# Patient Record
Sex: FEMALE | Race: WHITE | NOT HISPANIC OR LATINO | URBAN - METROPOLITAN AREA
[De-identification: names, ages, dates, MRNs, and addresses within clinical notes are randomized per-mention and may not be internally consistent; named-entity substitution may affect disease eponyms.]

---

## 2017-01-16 ENCOUNTER — INPATIENT (INPATIENT)
Facility: HOSPITAL | Age: 77
LOS: 8 days | Discharge: NURSING, REHAB OR LONG-TERM CARE | DRG: 690 | End: 2017-01-25
Attending: INTERNAL MEDICINE | Admitting: INTERNAL MEDICINE
Payer: MEDICARE

## 2017-01-16 VITALS
WEIGHT: 154.98 LBS | HEART RATE: 54 BPM | RESPIRATION RATE: 19 BRPM | OXYGEN SATURATION: 100 % | SYSTOLIC BLOOD PRESSURE: 116 MMHG | HEIGHT: 58 IN | TEMPERATURE: 98 F | DIASTOLIC BLOOD PRESSURE: 55 MMHG

## 2017-01-16 DIAGNOSIS — E86.0 DEHYDRATION: ICD-10-CM

## 2017-01-16 DIAGNOSIS — D64.9 ANEMIA, UNSPECIFIED: ICD-10-CM

## 2017-01-16 DIAGNOSIS — E83.52 HYPERCALCEMIA: ICD-10-CM

## 2017-01-16 DIAGNOSIS — N28.9 DISORDER OF KIDNEY AND URETER, UNSPECIFIED: ICD-10-CM

## 2017-01-16 DIAGNOSIS — Z29.9 ENCOUNTER FOR PROPHYLACTIC MEASURES, UNSPECIFIED: ICD-10-CM

## 2017-01-16 DIAGNOSIS — J44.9 CHRONIC OBSTRUCTIVE PULMONARY DISEASE, UNSPECIFIED: ICD-10-CM

## 2017-01-16 DIAGNOSIS — R41.82 ALTERED MENTAL STATUS, UNSPECIFIED: ICD-10-CM

## 2017-01-16 LAB
ACETONE SERPL-MCNC: ABNORMAL
ALBUMIN SERPL ELPH-MCNC: 3 G/DL — LOW (ref 3.5–5)
ALP SERPL-CCNC: 132 U/L — HIGH (ref 40–120)
ALT FLD-CCNC: 9 U/L DA — LOW (ref 10–60)
ANION GAP SERPL CALC-SCNC: 7 MMOL/L — SIGNIFICANT CHANGE UP (ref 5–17)
APPEARANCE UR: ABNORMAL
APTT BLD: 29.5 SEC — SIGNIFICANT CHANGE UP (ref 27.5–37.4)
AST SERPL-CCNC: 5 U/L — LOW (ref 10–40)
BACTERIA # UR AUTO: ABNORMAL /HPF
BASOPHILS # BLD AUTO: 0.1 K/UL — SIGNIFICANT CHANGE UP (ref 0–0.2)
BASOPHILS NFR BLD AUTO: 0.8 % — SIGNIFICANT CHANGE UP (ref 0–2)
BILIRUB SERPL-MCNC: 0.7 MG/DL — SIGNIFICANT CHANGE UP (ref 0.2–1.2)
BILIRUB UR-MCNC: NEGATIVE — SIGNIFICANT CHANGE UP
BUN SERPL-MCNC: 28 MG/DL — HIGH (ref 7–18)
CALCIUM SERPL-MCNC: 11.3 MG/DL — HIGH (ref 8.4–10.5)
CHLORIDE SERPL-SCNC: 114 MMOL/L — HIGH (ref 96–108)
CK MB BLD-MCNC: <2.7 % — SIGNIFICANT CHANGE UP (ref 0–3.5)
CK MB CFR SERPL CALC: <1 NG/ML — SIGNIFICANT CHANGE UP (ref 0–3.6)
CK SERPL-CCNC: 37 U/L — SIGNIFICANT CHANGE UP (ref 21–215)
CO2 SERPL-SCNC: 28 MMOL/L — SIGNIFICANT CHANGE UP (ref 22–31)
COLOR SPEC: SIGNIFICANT CHANGE UP
CREAT SERPL-MCNC: 2.05 MG/DL — HIGH (ref 0.5–1.3)
DIFF PNL FLD: ABNORMAL
EOSINOPHIL # BLD AUTO: 0.5 K/UL — SIGNIFICANT CHANGE UP (ref 0–0.5)
EOSINOPHIL NFR BLD AUTO: 3.5 % — SIGNIFICANT CHANGE UP (ref 0–6)
EPI CELLS # UR: ABNORMAL (ref 0–10)
GLUCOSE SERPL-MCNC: 107 MG/DL — HIGH (ref 70–99)
GLUCOSE UR QL: NEGATIVE — SIGNIFICANT CHANGE UP
HCT VFR BLD CALC: 36.6 % — SIGNIFICANT CHANGE UP (ref 34.5–45)
HGB BLD-MCNC: 11 G/DL — LOW (ref 11.5–15.5)
INR BLD: 1.06 RATIO — SIGNIFICANT CHANGE UP (ref 0.88–1.16)
KETONES UR-MCNC: NEGATIVE — SIGNIFICANT CHANGE UP
LACTATE SERPL-SCNC: 1 MMOL/L — SIGNIFICANT CHANGE UP (ref 0.7–2)
LEUKOCYTE ESTERASE UR-ACNC: ABNORMAL
LIDOCAIN IGE QN: 215 U/L — SIGNIFICANT CHANGE UP (ref 73–393)
LITHIUM SERPL-MCNC: 2.2 MMOL/L — CRITICAL HIGH (ref 0.6–1.2)
LYMPHOCYTES # BLD AUTO: 16.8 % — SIGNIFICANT CHANGE UP (ref 13–44)
LYMPHOCYTES # BLD AUTO: 2.4 K/UL — SIGNIFICANT CHANGE UP (ref 1–3.3)
MAGNESIUM SERPL-MCNC: 2.1 MG/DL — SIGNIFICANT CHANGE UP (ref 1.8–2.4)
MCHC RBC-ENTMCNC: 27.4 PG — SIGNIFICANT CHANGE UP (ref 27–34)
MCHC RBC-ENTMCNC: 30.1 GM/DL — LOW (ref 32–36)
MCV RBC AUTO: 91.1 FL — SIGNIFICANT CHANGE UP (ref 80–100)
MONOCYTES # BLD AUTO: 0.6 K/UL — SIGNIFICANT CHANGE UP (ref 0–0.9)
MONOCYTES NFR BLD AUTO: 3.9 % — SIGNIFICANT CHANGE UP (ref 2–14)
NEUTROPHILS # BLD AUTO: 10.8 K/UL — HIGH (ref 1.8–7.4)
NEUTROPHILS NFR BLD AUTO: 75 % — SIGNIFICANT CHANGE UP (ref 43–77)
NITRITE UR-MCNC: NEGATIVE — SIGNIFICANT CHANGE UP
NT-PROBNP SERPL-SCNC: 73 PG/ML — SIGNIFICANT CHANGE UP (ref 0–450)
PH UR: 6 — SIGNIFICANT CHANGE UP (ref 4.8–8)
PLATELET # BLD AUTO: 146 K/UL — LOW (ref 150–400)
POTASSIUM SERPL-MCNC: 4.4 MMOL/L — SIGNIFICANT CHANGE UP (ref 3.5–5.3)
POTASSIUM SERPL-SCNC: 4.4 MMOL/L — SIGNIFICANT CHANGE UP (ref 3.5–5.3)
PROT SERPL-MCNC: 7.1 G/DL — SIGNIFICANT CHANGE UP (ref 6–8.3)
PROT UR-MCNC: 30 MG/DL
PROTHROM AB SERPL-ACNC: 11.9 SEC — SIGNIFICANT CHANGE UP (ref 10–13.1)
RBC # BLD: 4.02 M/UL — SIGNIFICANT CHANGE UP (ref 3.8–5.2)
RBC # FLD: 15.6 % — HIGH (ref 10.3–14.5)
RBC CASTS # UR COMP ASSIST: ABNORMAL /HPF (ref 0–2)
SODIUM SERPL-SCNC: 149 MMOL/L — HIGH (ref 135–145)
SP GR SPEC: 1.01 — SIGNIFICANT CHANGE UP (ref 1.01–1.02)
TROPONIN I SERPL-MCNC: <0.015 NG/ML — SIGNIFICANT CHANGE UP (ref 0–0.04)
UROBILINOGEN FLD QL: NEGATIVE — SIGNIFICANT CHANGE UP
WBC # BLD: 14.5 K/UL — HIGH (ref 3.8–10.5)
WBC # FLD AUTO: 14.5 K/UL — HIGH (ref 3.8–10.5)
WBC UR QL: >50 /HPF (ref 0–5)

## 2017-01-16 PROCEDURE — 70450 CT HEAD/BRAIN W/O DYE: CPT | Mod: 26

## 2017-01-16 PROCEDURE — 99285 EMERGENCY DEPT VISIT HI MDM: CPT

## 2017-01-16 PROCEDURE — 71010: CPT | Mod: 26

## 2017-01-16 RX ORDER — PREGABALIN 225 MG/1
100 CAPSULE ORAL DAILY
Qty: 0 | Refills: 0 | Status: DISCONTINUED | OUTPATIENT
Start: 2017-01-16 | End: 2017-01-25

## 2017-01-16 RX ORDER — PANTOPRAZOLE SODIUM 20 MG/1
40 TABLET, DELAYED RELEASE ORAL
Qty: 0 | Refills: 0 | Status: DISCONTINUED | OUTPATIENT
Start: 2017-01-16 | End: 2017-01-25

## 2017-01-16 RX ORDER — SODIUM CHLORIDE 9 MG/ML
1000 INJECTION INTRAMUSCULAR; INTRAVENOUS; SUBCUTANEOUS ONCE
Qty: 0 | Refills: 0 | Status: COMPLETED | OUTPATIENT
Start: 2017-01-16 | End: 2017-01-16

## 2017-01-16 RX ORDER — ANASTROZOLE 1 MG/1
1 TABLET ORAL DAILY
Qty: 0 | Refills: 0 | Status: DISCONTINUED | OUTPATIENT
Start: 2017-01-16 | End: 2017-01-25

## 2017-01-16 RX ORDER — BETHANECHOL CHLORIDE 25 MG
25 TABLET ORAL
Qty: 0 | Refills: 0 | Status: DISCONTINUED | OUTPATIENT
Start: 2017-01-16 | End: 2017-01-25

## 2017-01-16 RX ORDER — ERGOCALCIFEROL 1.25 MG/1
50000 CAPSULE ORAL
Qty: 0 | Refills: 0 | Status: DISCONTINUED | OUTPATIENT
Start: 2017-01-17 | End: 2017-01-25

## 2017-01-16 RX ORDER — RISPERIDONE 4 MG/1
2 TABLET ORAL
Qty: 0 | Refills: 0 | Status: DISCONTINUED | OUTPATIENT
Start: 2017-01-16 | End: 2017-01-25

## 2017-01-16 RX ORDER — LITHIUM CARBONATE 300 MG/1
300 TABLET, EXTENDED RELEASE ORAL
Qty: 0 | Refills: 0 | Status: DISCONTINUED | OUTPATIENT
Start: 2017-01-16 | End: 2017-01-17

## 2017-01-16 RX ORDER — SODIUM CHLORIDE 9 MG/ML
3 INJECTION INTRAMUSCULAR; INTRAVENOUS; SUBCUTANEOUS ONCE
Qty: 0 | Refills: 0 | Status: COMPLETED | OUTPATIENT
Start: 2017-01-16 | End: 2017-01-16

## 2017-01-16 RX ORDER — SIMVASTATIN 20 MG/1
40 TABLET, FILM COATED ORAL AT BEDTIME
Qty: 0 | Refills: 0 | Status: DISCONTINUED | OUTPATIENT
Start: 2017-01-16 | End: 2017-01-25

## 2017-01-16 RX ORDER — HEPARIN SODIUM 5000 [USP'U]/ML
5000 INJECTION INTRAVENOUS; SUBCUTANEOUS EVERY 12 HOURS
Qty: 0 | Refills: 0 | Status: DISCONTINUED | OUTPATIENT
Start: 2017-01-16 | End: 2017-01-25

## 2017-01-16 RX ORDER — PIPERACILLIN AND TAZOBACTAM 4; .5 G/20ML; G/20ML
3.38 INJECTION, POWDER, LYOPHILIZED, FOR SOLUTION INTRAVENOUS ONCE
Qty: 0 | Refills: 0 | Status: COMPLETED | OUTPATIENT
Start: 2017-01-16 | End: 2017-01-16

## 2017-01-16 RX ORDER — LORATADINE 10 MG/1
10 TABLET ORAL DAILY
Qty: 0 | Refills: 0 | Status: DISCONTINUED | OUTPATIENT
Start: 2017-01-16 | End: 2017-01-25

## 2017-01-16 RX ORDER — BENZTROPINE MESYLATE 1 MG
0.5 TABLET ORAL AT BEDTIME
Qty: 0 | Refills: 0 | Status: DISCONTINUED | OUTPATIENT
Start: 2017-01-16 | End: 2017-01-25

## 2017-01-16 RX ORDER — CEFTRIAXONE 500 MG/1
1 INJECTION, POWDER, FOR SOLUTION INTRAMUSCULAR; INTRAVENOUS EVERY 24 HOURS
Qty: 0 | Refills: 0 | Status: DISCONTINUED | OUTPATIENT
Start: 2017-01-16 | End: 2017-01-18

## 2017-01-16 RX ADMIN — PIPERACILLIN AND TAZOBACTAM 200 GRAM(S): 4; .5 INJECTION, POWDER, LYOPHILIZED, FOR SOLUTION INTRAVENOUS at 15:48

## 2017-01-16 RX ADMIN — SIMVASTATIN 40 MILLIGRAM(S): 20 TABLET, FILM COATED ORAL at 21:52

## 2017-01-16 RX ADMIN — SODIUM CHLORIDE 3 MILLILITER(S): 9 INJECTION INTRAMUSCULAR; INTRAVENOUS; SUBCUTANEOUS at 14:39

## 2017-01-16 RX ADMIN — Medication 0.5 MILLIGRAM(S): at 21:52

## 2017-01-16 RX ADMIN — CEFTRIAXONE 100 GRAM(S): 500 INJECTION, POWDER, FOR SOLUTION INTRAMUSCULAR; INTRAVENOUS at 21:08

## 2017-01-16 RX ADMIN — SODIUM CHLORIDE 1000 MILLILITER(S): 9 INJECTION INTRAMUSCULAR; INTRAVENOUS; SUBCUTANEOUS at 21:09

## 2017-01-16 NOTE — H&P ADULT. - ASSESSMENT
Patient is a 77 y/o F , walks with a walker at baseline, from [facility]. PMH of COPD, anemia, Parkinson's disease, HLD, Bipolar Disorder (on lithium), Breast Cancer, CKD and recent discharge from [facility] (Dec 22) after treatment for UTI and pneumonia was sent back for change in mental state described as lethargy and drooling. Patient has h/o unasyn and Augmentin given for UTI and was evaluated by  Dr Phan, ID in the past

## 2017-01-16 NOTE — ED PROVIDER NOTE - NS ED MD SCRIBE ATTENDING SCRIBE SECTIONS
PHYSICAL EXAM/HIV/REVIEW OF SYSTEMS/HISTORY OF PRESENT ILLNESS/VITAL SIGNS( Pullset)/DISPOSITION/PAST MEDICAL/SURGICAL/SOCIAL HISTORY

## 2017-01-16 NOTE — ED PROVIDER NOTE - OBJECTIVE STATEMENT
77 y/o F w/ PMHx of Parkinson's disease, HLD, anemia, COPD, and bipolar disorder BIB EMS from NH p/w lethargy onset yesterday. NKDA. Pt is DNR. Pt is a former smoker. Multiple drug allergies. Hx and ROS limited by mentation. Pt does not ambulate at baseline.

## 2017-01-16 NOTE — H&P ADULT. - PROBLEM SELECTOR PLAN 6
- Hb 11.0 (baseline 9.3)  - secondary to hemoconcentration   - patient had anemia panel done in last admission and was found to have JANINA  - will continue ferrous sulphate   - will not repeat anemia panel as was performed within 3 months

## 2017-01-16 NOTE — H&P ADULT. - PROBLEM SELECTOR PLAN 2
- BUN/Cr ratio >20:1 indicative of pre-renal TAMARA   - will give IV fluids   - will not place Joseph's catheter because Sister feels that patient gets CAUTIs  - f/u urine lyes and osmolality   - hold nephrotoxic drugs   - f/u BUN/Cr in am   - if persists may get renal US - BUN/Cr ratio >20:1 indicative of pre-renal TAMARA   - TAMARA on CKD (lower GFR and Cr 2.02, baseline 1.5)  - will give IV fluids   - will not place Joseph's catheter because Sister feels that patient gets CAUTIs  - f/u urine lyes and osmolality   - hold nephrotoxic drugs   - f/u BUN/Cr in am   - if persists may get renal US

## 2017-01-16 NOTE — ED ADULT NURSE REASSESSMENT NOTE - NS ED NURSE REASSESS COMMENT FT1
Patient received being nursed in high folwer position, alert to person, speech incoherent. breathing spontaneously on oxygen via nasal cannula @3lpm.Patient reported no complaints of pain. Patient is being admitted. .Admitting MD at bedside.

## 2017-01-16 NOTE — ED ADULT NURSE NOTE - OBJECTIVE STATEMENT
Patient came to the ED alert and responsive to stimuli for lethargy. Patient has robert crackles on lungs. No distress noted. Placed on O2 2L NC.

## 2017-01-16 NOTE — ED PROVIDER NOTE - CARE PLAN
Principal Discharge DX:	Dehydration  Secondary Diagnosis:	UTI (urinary tract infection)  Secondary Diagnosis:	Renal insufficiency

## 2017-01-16 NOTE — H&P ADULT. - PROBLEM SELECTOR PLAN 1
- most likely secondary to UTI  - will get CT head to r/o any CVA (unlikely as no FND on exam)  - Ca elevated to 11, mildly elevated, unlikely etiology of AMS   - polypharmacy due to bipolar and parkinson's disease possible differential; f/u lithium levels   - continue Rocephin for UTI (s/p levaquin and zosyn in ED)  - f/u urine cultures and blood cultures   - tylenol as needed for fever  - f/u ECHO for worsening activity status  - X ray shows small left pleural effusion which might be the opacity seen earlier (2014, poor visualization of hemidiaphragm). no consolidation or infiltrates seen; will repeat CXR in am.  - aspiration precautions and speech and swallow evaluations requested; continue dysphagia diet at present (patient was taking pureed diet at NH) - most likely secondary to UTI  - will get CT head to r/o any CVA (unlikely as no FND on exam)  - Ca elevated to 11, mildly elevated, unlikely etiology of AMS   - polypharmacy due to bipolar and parkinson's disease possible differential  - lithium levels elevated. will hold lithium; next level in am; consider calling psych for another medication for bipolar if patient has tramaine episodes  - continue Rocephin for UTI (s/p levaquin and zosyn in ED)  - f/u urine cultures and blood cultures   - tylenol as needed for fever  - f/u ECHO for worsening activity status  - X ray shows small left pleural effusion which might be the opacity seen earlier (2014, poor visualization of hemidiaphragm). no consolidation or infiltrates seen; will repeat CXR in am.  - aspiration precautions and speech and swallow evaluations requested; continue dysphagia diet at present (patient was taking pureed diet at NH) - most likely secondary to UTI  - will get CT head to r/o any CVA (unlikely as no FND on exam)  - Ca elevated to 11, mildly elevated, unlikely etiology of AMS   - polypharmacy due to bipolar and parkinson's disease possible differential  - lithium levels elevated. will hold lithium; next level in am; called Dr Hoskins  for another medication for bipolar or dose adjustment   - continue Rocephin for UTI (s/p levaquin and zosyn in ED)  - f/u urine cultures and blood cultures   - tylenol as needed for fever  - f/u ECHO for worsening activity status  - X ray shows small left pleural effusion which might be the opacity seen earlier (2014, poor visualization of hemidiaphragm). no consolidation or infiltrates seen; will repeat CXR in am.  - aspiration precautions and speech and swallow evaluations requested; continue dysphagia diet at present (patient was taking pureed diet at NH)

## 2017-01-16 NOTE — H&P ADULT. - NEGATIVE ENMT SYMPTOMS
no vertigo/no sinus symptoms/no ear pain/no nasal discharge/no hearing difficulty/no tinnitus/no nasal obstruction/no nasal congestion

## 2017-01-16 NOTE — H&P ADULT. - PROBLEM SELECTOR PLAN 3
- most likley secondary to dehydration   - patient has h/o breast cancer and lumpectomy 3 years ago; possible etiology   - will give iv fluids  - repeat levels in am

## 2017-01-16 NOTE — ED PROVIDER NOTE - PMH
Anemia    Bipolar 1 disorder    COPD (chronic obstructive pulmonary disease)    HLD (hyperlipidemia)    Parkinson's disease

## 2017-01-16 NOTE — ED ADULT TRIAGE NOTE - NS ED TRIAGE AVPU SCALE
Painful - The patient does not respond to voice, but does respond to a painful stimulus, such as a squeeze to the hand or sternal rub. A noxious stimulous is needed to elicit a response.

## 2017-01-16 NOTE — ED PROVIDER NOTE - ENMT, MLM
Airway patent, Nasal mucosa clear. Mouth with dry mucosa. Throat has no vesicles, no oropharyngeal exudates and uvula is midline.

## 2017-01-16 NOTE — H&P ADULT. - RS GEN PE MLT RESP DETAILS PC
clear to auscultation bilaterally/breath sounds equal/good air movement/airway patent/normal/respirations non-labored

## 2017-01-16 NOTE — H&P ADULT. - HISTORY OF PRESENT ILLNESS
Patient is a 75 y/o F , walks with a walker at baseline, from [facility]. PMH of COPD, anemia, Parkinson's disease, HLD, Bipolar Disorder (on lithium), Breast Cancer, CKD and recent discharge from [facility] (Dec 22) after treatment for UTI and pneumonia was sent back for change in mental state described as lethargy and drooling.      Patient is lethargic and oriented only to name. History obtained by sister. After discharge, patient was in rehab where she became progressively weak. On Friday, sister noticed that patient was not able to walk, unable to talk and was drooling in saliva. She felt that patient had a stroke or overdosed on medications.  However in the NH she was told that patient didnt have a stroke and was sent to [facility] for further evaluation. Sister also reported that patient might have diarrhea since last few weeks and that she doesn't drink enough water and is usually dehydrated. Denies any pain, URI, hematuria, rash/ulcer, weight change or other complaints.   Social: patient has been living in facility for years and sister Brissa Lozano at [phone] makes decision for her. She has a daughter in [place] ([place]boni Dougherty). Patient is a former smoker.   Family: unknown   Goals of care: DNR/DNI as per MOLST form. Confirmed by sister

## 2017-01-17 LAB
ANION GAP SERPL CALC-SCNC: 4 MMOL/L — LOW (ref 5–17)
BASOPHILS # BLD AUTO: 0.1 K/UL — SIGNIFICANT CHANGE UP (ref 0–0.2)
BASOPHILS # BLD AUTO: 0.1 K/UL — SIGNIFICANT CHANGE UP (ref 0–0.2)
BASOPHILS NFR BLD AUTO: 0.5 % — SIGNIFICANT CHANGE UP (ref 0–2)
BASOPHILS NFR BLD AUTO: 0.7 % — SIGNIFICANT CHANGE UP (ref 0–2)
BUN SERPL-MCNC: 27 MG/DL — HIGH (ref 7–18)
CALCIUM SERPL-MCNC: 11.1 MG/DL — HIGH (ref 8.4–10.5)
CHLORIDE SERPL-SCNC: 122 MMOL/L — HIGH (ref 96–108)
CHOLEST SERPL-MCNC: 127 MG/DL — SIGNIFICANT CHANGE UP (ref 10–199)
CK SERPL-CCNC: 55 U/L — SIGNIFICANT CHANGE UP (ref 21–215)
CO2 SERPL-SCNC: 27 MMOL/L — SIGNIFICANT CHANGE UP (ref 22–31)
CREAT SERPL-MCNC: 1.82 MG/DL — HIGH (ref 0.5–1.3)
EOSINOPHIL # BLD AUTO: 0.4 K/UL — SIGNIFICANT CHANGE UP (ref 0–0.5)
EOSINOPHIL # BLD AUTO: 0.5 K/UL — SIGNIFICANT CHANGE UP (ref 0–0.5)
EOSINOPHIL NFR BLD AUTO: 2.8 % — SIGNIFICANT CHANGE UP (ref 0–6)
EOSINOPHIL NFR BLD AUTO: 2.9 % — SIGNIFICANT CHANGE UP (ref 0–6)
GLUCOSE SERPL-MCNC: 136 MG/DL — HIGH (ref 70–99)
HBA1C BLD-MCNC: 5.4 % — SIGNIFICANT CHANGE UP (ref 4–5.6)
HCT VFR BLD CALC: 30.9 % — LOW (ref 34.5–45)
HCT VFR BLD CALC: 31.6 % — LOW (ref 34.5–45)
HDLC SERPL-MCNC: 44 MG/DL — SIGNIFICANT CHANGE UP (ref 40–125)
HGB BLD-MCNC: 10 G/DL — LOW (ref 11.5–15.5)
HGB BLD-MCNC: 9.7 G/DL — LOW (ref 11.5–15.5)
LIPID PNL WITH DIRECT LDL SERPL: 41 MG/DL — SIGNIFICANT CHANGE UP
LITHIUM SERPL-MCNC: 1.8 MMOL/L — CRITICAL HIGH (ref 0.6–1.2)
LYMPHOCYTES # BLD AUTO: 1.7 K/UL — SIGNIFICANT CHANGE UP (ref 1–3.3)
LYMPHOCYTES # BLD AUTO: 1.8 K/UL — SIGNIFICANT CHANGE UP (ref 1–3.3)
LYMPHOCYTES # BLD AUTO: 11.5 % — LOW (ref 13–44)
LYMPHOCYTES # BLD AUTO: 11.6 % — LOW (ref 13–44)
MAGNESIUM SERPL-MCNC: 2.3 MG/DL — SIGNIFICANT CHANGE UP (ref 1.8–2.4)
MCHC RBC-ENTMCNC: 27.7 PG — SIGNIFICANT CHANGE UP (ref 27–34)
MCHC RBC-ENTMCNC: 29.2 PG — SIGNIFICANT CHANGE UP (ref 27–34)
MCHC RBC-ENTMCNC: 30.6 GM/DL — LOW (ref 32–36)
MCHC RBC-ENTMCNC: 32.2 GM/DL — SIGNIFICANT CHANGE UP (ref 32–36)
MCV RBC AUTO: 90.6 FL — SIGNIFICANT CHANGE UP (ref 80–100)
MCV RBC AUTO: 90.7 FL — SIGNIFICANT CHANGE UP (ref 80–100)
MONOCYTES # BLD AUTO: 0.5 K/UL — SIGNIFICANT CHANGE UP (ref 0–0.9)
MONOCYTES # BLD AUTO: 0.6 K/UL — SIGNIFICANT CHANGE UP (ref 0–0.9)
MONOCYTES NFR BLD AUTO: 3.3 % — SIGNIFICANT CHANGE UP (ref 2–14)
MONOCYTES NFR BLD AUTO: 3.7 % — SIGNIFICANT CHANGE UP (ref 2–14)
NEUTROPHILS # BLD AUTO: 11.9 K/UL — HIGH (ref 1.8–7.4)
NEUTROPHILS # BLD AUTO: 12.9 K/UL — HIGH (ref 1.8–7.4)
NEUTROPHILS NFR BLD AUTO: 81.3 % — HIGH (ref 43–77)
NEUTROPHILS NFR BLD AUTO: 81.8 % — HIGH (ref 43–77)
PHOSPHATE SERPL-MCNC: 2.9 MG/DL — SIGNIFICANT CHANGE UP (ref 2.5–4.5)
PLATELET # BLD AUTO: 124 K/UL — LOW (ref 150–400)
PLATELET # BLD AUTO: 127 K/UL — LOW (ref 150–400)
POTASSIUM SERPL-MCNC: 4.6 MMOL/L — SIGNIFICANT CHANGE UP (ref 3.5–5.3)
POTASSIUM SERPL-SCNC: 4.6 MMOL/L — SIGNIFICANT CHANGE UP (ref 3.5–5.3)
RBC # BLD: 3.42 M/UL — LOW (ref 3.8–5.2)
RBC # BLD: 3.49 M/UL — LOW (ref 3.8–5.2)
RBC # FLD: 15.4 % — HIGH (ref 10.3–14.5)
RBC # FLD: 15.5 % — HIGH (ref 10.3–14.5)
SODIUM SERPL-SCNC: 153 MMOL/L — HIGH (ref 135–145)
TOTAL CHOLESTEROL/HDL RATIO MEASUREMENT: 2.9 RATIO — LOW (ref 3.3–7.1)
TRIGL SERPL-MCNC: 210 MG/DL — HIGH (ref 10–149)
TSH SERPL-MCNC: 2.08 UU/ML — SIGNIFICANT CHANGE UP (ref 0.34–4.82)
VIT B12 SERPL-MCNC: 250 PG/ML — SIGNIFICANT CHANGE UP (ref 243–894)
VIT D25+D1,25 OH+D1,25 PNL SERPL-MCNC: 13.9 PG/ML — LOW (ref 19.9–79.3)
WBC # BLD: 14.6 K/UL — HIGH (ref 3.8–10.5)
WBC # BLD: 15.9 K/UL — HIGH (ref 3.8–10.5)
WBC # FLD AUTO: 14.6 K/UL — HIGH (ref 3.8–10.5)
WBC # FLD AUTO: 15.9 K/UL — HIGH (ref 3.8–10.5)

## 2017-01-17 PROCEDURE — 76770 US EXAM ABDO BACK WALL COMP: CPT | Mod: 26

## 2017-01-17 PROCEDURE — 76775 US EXAM ABDO BACK WALL LIM: CPT | Mod: 26

## 2017-01-17 PROCEDURE — 71010: CPT | Mod: 26

## 2017-01-17 RX ORDER — HALOPERIDOL DECANOATE 100 MG/ML
1 INJECTION INTRAMUSCULAR EVERY 12 HOURS
Qty: 0 | Refills: 0 | Status: DISCONTINUED | OUTPATIENT
Start: 2017-01-17 | End: 2017-01-18

## 2017-01-17 RX ORDER — SODIUM CHLORIDE 9 MG/ML
1000 INJECTION INTRAMUSCULAR; INTRAVENOUS; SUBCUTANEOUS
Qty: 0 | Refills: 0 | Status: DISCONTINUED | OUTPATIENT
Start: 2017-01-17 | End: 2017-01-17

## 2017-01-17 RX ORDER — SODIUM CHLORIDE 9 MG/ML
1000 INJECTION, SOLUTION INTRAVENOUS
Qty: 0 | Refills: 0 | Status: DISCONTINUED | OUTPATIENT
Start: 2017-01-17 | End: 2017-01-20

## 2017-01-17 RX ADMIN — ANASTROZOLE 1 MILLIGRAM(S): 1 TABLET ORAL at 12:26

## 2017-01-17 RX ADMIN — HEPARIN SODIUM 5000 UNIT(S): 5000 INJECTION INTRAVENOUS; SUBCUTANEOUS at 05:34

## 2017-01-17 RX ADMIN — Medication 0.5 MILLIGRAM(S): at 21:44

## 2017-01-17 RX ADMIN — HEPARIN SODIUM 5000 UNIT(S): 5000 INJECTION INTRAVENOUS; SUBCUTANEOUS at 17:57

## 2017-01-17 RX ADMIN — SODIUM CHLORIDE 75 MILLILITER(S): 9 INJECTION INTRAMUSCULAR; INTRAVENOUS; SUBCUTANEOUS at 12:26

## 2017-01-17 RX ADMIN — Medication 25 MILLIGRAM(S): at 05:34

## 2017-01-17 RX ADMIN — SIMVASTATIN 40 MILLIGRAM(S): 20 TABLET, FILM COATED ORAL at 21:44

## 2017-01-17 RX ADMIN — ERGOCALCIFEROL 50000 UNIT(S): 1.25 CAPSULE ORAL at 12:26

## 2017-01-17 RX ADMIN — RISPERIDONE 2 MILLIGRAM(S): 4 TABLET ORAL at 05:34

## 2017-01-17 RX ADMIN — LORATADINE 10 MILLIGRAM(S): 10 TABLET ORAL at 12:26

## 2017-01-17 RX ADMIN — PREGABALIN 100 MICROGRAM(S): 225 CAPSULE ORAL at 12:26

## 2017-01-17 RX ADMIN — PANTOPRAZOLE SODIUM 40 MILLIGRAM(S): 20 TABLET, DELAYED RELEASE ORAL at 06:21

## 2017-01-17 RX ADMIN — CEFTRIAXONE 100 GRAM(S): 500 INJECTION, POWDER, FOR SOLUTION INTRAMUSCULAR; INTRAVENOUS at 21:44

## 2017-01-17 RX ADMIN — SODIUM CHLORIDE 100 MILLILITER(S): 9 INJECTION, SOLUTION INTRAVENOUS at 21:44

## 2017-01-17 RX ADMIN — RISPERIDONE 2 MILLIGRAM(S): 4 TABLET ORAL at 17:56

## 2017-01-17 RX ADMIN — SODIUM CHLORIDE 100 MILLILITER(S): 9 INJECTION, SOLUTION INTRAVENOUS at 14:39

## 2017-01-17 RX ADMIN — Medication 25 MILLIGRAM(S): at 17:57

## 2017-01-18 LAB
-  AMIKACIN: SIGNIFICANT CHANGE UP
-  AMPICILLIN/SULBACTAM: SIGNIFICANT CHANGE UP
-  AMPICILLIN: SIGNIFICANT CHANGE UP
-  AZTREONAM: SIGNIFICANT CHANGE UP
-  CEFAZOLIN: SIGNIFICANT CHANGE UP
-  CEFEPIME: SIGNIFICANT CHANGE UP
-  CEFOXITIN: SIGNIFICANT CHANGE UP
-  CEFTAZIDIME: SIGNIFICANT CHANGE UP
-  CEFTRIAXONE: SIGNIFICANT CHANGE UP
-  CIPROFLOXACIN: SIGNIFICANT CHANGE UP
-  ERTAPENEM: SIGNIFICANT CHANGE UP
-  GENTAMICIN: SIGNIFICANT CHANGE UP
-  IMIPENEM: SIGNIFICANT CHANGE UP
-  LEVOFLOXACIN: SIGNIFICANT CHANGE UP
-  MEROPENEM: SIGNIFICANT CHANGE UP
-  NITROFURANTOIN: SIGNIFICANT CHANGE UP
-  PIPERACILLIN/TAZOBACTAM: SIGNIFICANT CHANGE UP
-  TOBRAMYCIN: SIGNIFICANT CHANGE UP
-  TRIMETHOPRIM/SULFAMETHOXAZOLE: SIGNIFICANT CHANGE UP
ANION GAP SERPL CALC-SCNC: 3 MMOL/L — LOW (ref 5–17)
BASOPHILS # BLD AUTO: 0.1 K/UL — SIGNIFICANT CHANGE UP (ref 0–0.2)
BASOPHILS NFR BLD AUTO: 0.8 % — SIGNIFICANT CHANGE UP (ref 0–2)
BUN SERPL-MCNC: 25 MG/DL — HIGH (ref 7–18)
CALCIUM SERPL-MCNC: 11.2 MG/DL — HIGH (ref 8.4–10.5)
CHLORIDE SERPL-SCNC: 118 MMOL/L — HIGH (ref 96–108)
CO2 SERPL-SCNC: 28 MMOL/L — SIGNIFICANT CHANGE UP (ref 22–31)
CREAT SERPL-MCNC: 1.8 MG/DL — HIGH (ref 0.5–1.3)
CULTURE RESULTS: SIGNIFICANT CHANGE UP
EOSINOPHIL # BLD AUTO: 0.5 K/UL — SIGNIFICANT CHANGE UP (ref 0–0.5)
EOSINOPHIL NFR BLD AUTO: 3.4 % — SIGNIFICANT CHANGE UP (ref 0–6)
GLUCOSE SERPL-MCNC: 131 MG/DL — HIGH (ref 70–99)
HCT VFR BLD CALC: 33.1 % — LOW (ref 34.5–45)
HGB BLD-MCNC: 10.2 G/DL — LOW (ref 11.5–15.5)
LITHIUM SERPL-MCNC: 1.5 MMOL/L — HIGH (ref 0.6–1.2)
LYMPHOCYTES # BLD AUTO: 20.1 % — SIGNIFICANT CHANGE UP (ref 13–44)
LYMPHOCYTES # BLD AUTO: 3.2 K/UL — SIGNIFICANT CHANGE UP (ref 1–3.3)
MCHC RBC-ENTMCNC: 28 PG — SIGNIFICANT CHANGE UP (ref 27–34)
MCHC RBC-ENTMCNC: 30.8 GM/DL — LOW (ref 32–36)
MCV RBC AUTO: 91.1 FL — SIGNIFICANT CHANGE UP (ref 80–100)
METHOD TYPE: SIGNIFICANT CHANGE UP
MONOCYTES # BLD AUTO: 0.7 K/UL — SIGNIFICANT CHANGE UP (ref 0–0.9)
MONOCYTES NFR BLD AUTO: 4.2 % — SIGNIFICANT CHANGE UP (ref 2–14)
NEUTROPHILS # BLD AUTO: 11.5 K/UL — HIGH (ref 1.8–7.4)
NEUTROPHILS NFR BLD AUTO: 71.5 % — SIGNIFICANT CHANGE UP (ref 43–77)
ORGANISM # SPEC MICROSCOPIC CNT: SIGNIFICANT CHANGE UP
ORGANISM # SPEC MICROSCOPIC CNT: SIGNIFICANT CHANGE UP
PLATELET # BLD AUTO: 130 K/UL — LOW (ref 150–400)
POTASSIUM SERPL-MCNC: 4.1 MMOL/L — SIGNIFICANT CHANGE UP (ref 3.5–5.3)
POTASSIUM SERPL-SCNC: 4.1 MMOL/L — SIGNIFICANT CHANGE UP (ref 3.5–5.3)
RBC # BLD: 3.63 M/UL — LOW (ref 3.8–5.2)
RBC # FLD: 15.6 % — HIGH (ref 10.3–14.5)
SODIUM SERPL-SCNC: 149 MMOL/L — HIGH (ref 135–145)
SPECIMEN SOURCE: SIGNIFICANT CHANGE UP
WBC # BLD: 16.1 K/UL — HIGH (ref 3.8–10.5)
WBC # FLD AUTO: 16.1 K/UL — HIGH (ref 3.8–10.5)

## 2017-01-18 RX ORDER — MEROPENEM 1 G/30ML
INJECTION INTRAVENOUS
Qty: 0 | Refills: 0 | Status: DISCONTINUED | OUTPATIENT
Start: 2017-01-18 | End: 2017-01-23

## 2017-01-18 RX ORDER — IPRATROPIUM/ALBUTEROL SULFATE 18-103MCG
3 AEROSOL WITH ADAPTER (GRAM) INHALATION EVERY 6 HOURS
Qty: 0 | Refills: 0 | Status: DISCONTINUED | OUTPATIENT
Start: 2017-01-18 | End: 2017-01-25

## 2017-01-18 RX ORDER — MEROPENEM 1 G/30ML
1000 INJECTION INTRAVENOUS ONCE
Qty: 0 | Refills: 0 | Status: COMPLETED | OUTPATIENT
Start: 2017-01-18 | End: 2017-01-18

## 2017-01-18 RX ORDER — CHOLECALCIFEROL (VITAMIN D3) 125 MCG
1000 CAPSULE ORAL DAILY
Qty: 0 | Refills: 0 | Status: DISCONTINUED | OUTPATIENT
Start: 2017-01-18 | End: 2017-01-18

## 2017-01-18 RX ORDER — ACETYLCYSTEINE 200 MG/ML
3 VIAL (ML) MISCELLANEOUS THREE TIMES A DAY
Qty: 0 | Refills: 0 | Status: DISCONTINUED | OUTPATIENT
Start: 2017-01-18 | End: 2017-01-25

## 2017-01-18 RX ORDER — MEROPENEM 1 G/30ML
1000 INJECTION INTRAVENOUS EVERY 8 HOURS
Qty: 0 | Refills: 0 | Status: DISCONTINUED | OUTPATIENT
Start: 2017-01-19 | End: 2017-01-23

## 2017-01-18 RX ADMIN — ANASTROZOLE 1 MILLIGRAM(S): 1 TABLET ORAL at 14:13

## 2017-01-18 RX ADMIN — MEROPENEM 200 MILLIGRAM(S): 1 INJECTION INTRAVENOUS at 20:41

## 2017-01-18 RX ADMIN — PREGABALIN 100 MICROGRAM(S): 225 CAPSULE ORAL at 14:13

## 2017-01-18 RX ADMIN — Medication 25 MILLIGRAM(S): at 05:06

## 2017-01-18 RX ADMIN — SIMVASTATIN 40 MILLIGRAM(S): 20 TABLET, FILM COATED ORAL at 22:05

## 2017-01-18 RX ADMIN — PANTOPRAZOLE SODIUM 40 MILLIGRAM(S): 20 TABLET, DELAYED RELEASE ORAL at 05:07

## 2017-01-18 RX ADMIN — Medication 25 MILLIGRAM(S): at 18:01

## 2017-01-18 RX ADMIN — SODIUM CHLORIDE 100 MILLILITER(S): 9 INJECTION, SOLUTION INTRAVENOUS at 05:06

## 2017-01-18 RX ADMIN — Medication 3 MILLILITER(S): at 20:46

## 2017-01-18 RX ADMIN — RISPERIDONE 2 MILLIGRAM(S): 4 TABLET ORAL at 18:01

## 2017-01-18 RX ADMIN — LORATADINE 10 MILLIGRAM(S): 10 TABLET ORAL at 14:13

## 2017-01-18 RX ADMIN — HEPARIN SODIUM 5000 UNIT(S): 5000 INJECTION INTRAVENOUS; SUBCUTANEOUS at 18:01

## 2017-01-18 RX ADMIN — RISPERIDONE 2 MILLIGRAM(S): 4 TABLET ORAL at 05:06

## 2017-01-18 RX ADMIN — SODIUM CHLORIDE 100 MILLILITER(S): 9 INJECTION, SOLUTION INTRAVENOUS at 09:02

## 2017-01-18 RX ADMIN — HEPARIN SODIUM 5000 UNIT(S): 5000 INJECTION INTRAVENOUS; SUBCUTANEOUS at 05:06

## 2017-01-18 RX ADMIN — Medication 0.5 MILLIGRAM(S): at 22:05

## 2017-01-18 NOTE — PHYSICAL THERAPY INITIAL EVALUATION ADULT - CRITERIA FOR SKILLED THERAPEUTIC INTERVENTIONS
therapy frequency/functional limitations in following categories/risk reduction/prevention/anticipated discharge recommendation/impairments found/predicted duration of therapy intervention/rehab potential

## 2017-01-18 NOTE — PHYSICAL THERAPY INITIAL EVALUATION ADULT - GENERAL OBSERVATIONS, REHAB EVAL
Found lying supine, alert but confused; has difficulty following verbal commands. Pt. appears very weak.

## 2017-01-19 LAB
ANION GAP SERPL CALC-SCNC: 6 MMOL/L — SIGNIFICANT CHANGE UP (ref 5–17)
BASOPHILS # BLD AUTO: 0.1 K/UL — SIGNIFICANT CHANGE UP (ref 0–0.2)
BASOPHILS NFR BLD AUTO: 0.6 % — SIGNIFICANT CHANGE UP (ref 0–2)
BUN SERPL-MCNC: 18 MG/DL — SIGNIFICANT CHANGE UP (ref 7–18)
CALCIUM SERPL-MCNC: 10.9 MG/DL — HIGH (ref 8.4–10.5)
CHLORIDE SERPL-SCNC: 117 MMOL/L — HIGH (ref 96–108)
CO2 SERPL-SCNC: 26 MMOL/L — SIGNIFICANT CHANGE UP (ref 22–31)
CREAT SERPL-MCNC: 1.5 MG/DL — HIGH (ref 0.5–1.3)
EOSINOPHIL # BLD AUTO: 0.6 K/UL — HIGH (ref 0–0.5)
EOSINOPHIL NFR BLD AUTO: 4.9 % — SIGNIFICANT CHANGE UP (ref 0–6)
GLUCOSE SERPL-MCNC: 107 MG/DL — HIGH (ref 70–99)
HCT VFR BLD CALC: 29.8 % — LOW (ref 34.5–45)
HGB BLD-MCNC: 9.5 G/DL — LOW (ref 11.5–15.5)
LITHIUM SERPL-MCNC: 1.2 MMOL/L — SIGNIFICANT CHANGE UP (ref 0.6–1.2)
LYMPHOCYTES # BLD AUTO: 2.9 K/UL — SIGNIFICANT CHANGE UP (ref 1–3.3)
LYMPHOCYTES # BLD AUTO: 23.9 % — SIGNIFICANT CHANGE UP (ref 13–44)
MCHC RBC-ENTMCNC: 28.4 PG — SIGNIFICANT CHANGE UP (ref 27–34)
MCHC RBC-ENTMCNC: 31.8 GM/DL — LOW (ref 32–36)
MCV RBC AUTO: 89.4 FL — SIGNIFICANT CHANGE UP (ref 80–100)
MONOCYTES # BLD AUTO: 0.4 K/UL — SIGNIFICANT CHANGE UP (ref 0–0.9)
MONOCYTES NFR BLD AUTO: 3.3 % — SIGNIFICANT CHANGE UP (ref 2–14)
NEUTROPHILS # BLD AUTO: 8.1 K/UL — HIGH (ref 1.8–7.4)
NEUTROPHILS NFR BLD AUTO: 67.3 % — SIGNIFICANT CHANGE UP (ref 43–77)
PLATELET # BLD AUTO: 114 K/UL — LOW (ref 150–400)
POTASSIUM SERPL-MCNC: 4.4 MMOL/L — SIGNIFICANT CHANGE UP (ref 3.5–5.3)
POTASSIUM SERPL-SCNC: 4.4 MMOL/L — SIGNIFICANT CHANGE UP (ref 3.5–5.3)
RBC # BLD: 3.33 M/UL — LOW (ref 3.8–5.2)
RBC # FLD: 15.2 % — HIGH (ref 10.3–14.5)
SODIUM SERPL-SCNC: 149 MMOL/L — HIGH (ref 135–145)
WBC # BLD: 12 K/UL — HIGH (ref 3.8–10.5)
WBC # FLD AUTO: 12 K/UL — HIGH (ref 3.8–10.5)

## 2017-01-19 RX ADMIN — MEROPENEM 200 MILLIGRAM(S): 1 INJECTION INTRAVENOUS at 05:53

## 2017-01-19 RX ADMIN — LORATADINE 10 MILLIGRAM(S): 10 TABLET ORAL at 11:43

## 2017-01-19 RX ADMIN — Medication 3 MILLILITER(S): at 20:26

## 2017-01-19 RX ADMIN — MEROPENEM 200 MILLIGRAM(S): 1 INJECTION INTRAVENOUS at 13:38

## 2017-01-19 RX ADMIN — PREGABALIN 100 MICROGRAM(S): 225 CAPSULE ORAL at 11:43

## 2017-01-19 RX ADMIN — PANTOPRAZOLE SODIUM 40 MILLIGRAM(S): 20 TABLET, DELAYED RELEASE ORAL at 05:53

## 2017-01-19 RX ADMIN — Medication 3 MILLILITER(S): at 02:20

## 2017-01-19 RX ADMIN — HEPARIN SODIUM 5000 UNIT(S): 5000 INJECTION INTRAVENOUS; SUBCUTANEOUS at 05:53

## 2017-01-19 RX ADMIN — MEROPENEM 200 MILLIGRAM(S): 1 INJECTION INTRAVENOUS at 23:27

## 2017-01-19 RX ADMIN — Medication 3 MILLILITER(S): at 15:35

## 2017-01-19 RX ADMIN — Medication 3 MILLILITER(S): at 15:38

## 2017-01-19 RX ADMIN — Medication 25 MILLIGRAM(S): at 05:53

## 2017-01-19 RX ADMIN — HEPARIN SODIUM 5000 UNIT(S): 5000 INJECTION INTRAVENOUS; SUBCUTANEOUS at 17:17

## 2017-01-19 RX ADMIN — Medication 3 MILLILITER(S): at 20:27

## 2017-01-19 RX ADMIN — RISPERIDONE 2 MILLIGRAM(S): 4 TABLET ORAL at 05:53

## 2017-01-19 RX ADMIN — RISPERIDONE 2 MILLIGRAM(S): 4 TABLET ORAL at 17:17

## 2017-01-19 RX ADMIN — Medication 25 MILLIGRAM(S): at 17:17

## 2017-01-19 RX ADMIN — SIMVASTATIN 40 MILLIGRAM(S): 20 TABLET, FILM COATED ORAL at 23:27

## 2017-01-19 RX ADMIN — Medication 0.5 MILLIGRAM(S): at 23:27

## 2017-01-19 RX ADMIN — ANASTROZOLE 1 MILLIGRAM(S): 1 TABLET ORAL at 11:43

## 2017-01-19 NOTE — DIETITIAN INITIAL EVALUATION ADULT. - OTHER INFO
Pt visited. Asleep.Pt is Confused per H&P.Pt is from [facility] diet with Nectar thick liquids.D/W Sister Via phone Reports pt does not like Pureed food and Thicjk water so not eating and has lost weight overall ~10 lbs x2 months.St  [facility]oorva pendibng>D/W PCA pt C/o Pocketing food inside her Mouth.Observed Lunch meal.Pt ate only apple sauce.Per PCa pt does not eat appetite is very Poor.

## 2017-01-19 NOTE — DIETITIAN INITIAL EVALUATION ADULT. - PROBLEM SELECTOR PLAN 1
- most likely secondary to UTI  - will get CT head to r/o any CVA (unlikely as no FND on exam)  - Ca elevated to 11, mildly elevated, unlikely etiology of AMS   - polypharmacy due to bipolar and parkinson's disease possible differential  - lithium levels elevated. will hold lithium; next level in am; called Dr Hoskins  for another medication for bipolar or dose adjustment   - continue Rocephin for UTI (s/p levaquin and zosyn in ED)  - f/u urine cultures and blood cultures   - tylenol as needed for fever  - f/u ECHO for worsening activity status  - X ray shows small left pleural effusion which might be the opacity seen earlier (2014, poor visualization of hemidiaphragm). no consolidation or infiltrates seen; will repeat CXR in am.  - aspiration precautions and speech and swallow evaluations requested; continue dysphagia diet at present (patient was taking pureed diet at NH)

## 2017-01-20 LAB
ANION GAP SERPL CALC-SCNC: 5 MMOL/L — SIGNIFICANT CHANGE UP (ref 5–17)
ANION GAP SERPL CALC-SCNC: 7 MMOL/L — SIGNIFICANT CHANGE UP (ref 5–17)
BASOPHILS # BLD AUTO: 0.1 K/UL — SIGNIFICANT CHANGE UP (ref 0–0.2)
BASOPHILS NFR BLD AUTO: 0.7 % — SIGNIFICANT CHANGE UP (ref 0–2)
BUN SERPL-MCNC: 18 MG/DL — SIGNIFICANT CHANGE UP (ref 7–18)
BUN SERPL-MCNC: 19 MG/DL — HIGH (ref 7–18)
CALCIUM SERPL-MCNC: 10.9 MG/DL — HIGH (ref 8.4–10.5)
CALCIUM SERPL-MCNC: 11.3 MG/DL — HIGH (ref 8.4–10.5)
CHLORIDE SERPL-SCNC: 116 MMOL/L — HIGH (ref 96–108)
CHLORIDE SERPL-SCNC: 119 MMOL/L — HIGH (ref 96–108)
CO2 SERPL-SCNC: 26 MMOL/L — SIGNIFICANT CHANGE UP (ref 22–31)
CO2 SERPL-SCNC: 28 MMOL/L — SIGNIFICANT CHANGE UP (ref 22–31)
CREAT SERPL-MCNC: 1.42 MG/DL — HIGH (ref 0.5–1.3)
CREAT SERPL-MCNC: 1.45 MG/DL — HIGH (ref 0.5–1.3)
EOSINOPHIL # BLD AUTO: 0.8 K/UL — HIGH (ref 0–0.5)
EOSINOPHIL NFR BLD AUTO: 6.2 % — HIGH (ref 0–6)
GLUCOSE SERPL-MCNC: 129 MG/DL — HIGH (ref 70–99)
GLUCOSE SERPL-MCNC: 135 MG/DL — HIGH (ref 70–99)
HCT VFR BLD CALC: 31.1 % — LOW (ref 34.5–45)
HGB BLD-MCNC: 9.9 G/DL — LOW (ref 11.5–15.5)
LYMPHOCYTES # BLD AUTO: 18.8 % — SIGNIFICANT CHANGE UP (ref 13–44)
LYMPHOCYTES # BLD AUTO: 2.4 K/UL — SIGNIFICANT CHANGE UP (ref 1–3.3)
MCHC RBC-ENTMCNC: 28.4 PG — SIGNIFICANT CHANGE UP (ref 27–34)
MCHC RBC-ENTMCNC: 31.7 GM/DL — LOW (ref 32–36)
MCV RBC AUTO: 89.5 FL — SIGNIFICANT CHANGE UP (ref 80–100)
MONOCYTES # BLD AUTO: 0.6 K/UL — SIGNIFICANT CHANGE UP (ref 0–0.9)
MONOCYTES NFR BLD AUTO: 4.9 % — SIGNIFICANT CHANGE UP (ref 2–14)
NEUTROPHILS # BLD AUTO: 9 K/UL — HIGH (ref 1.8–7.4)
NEUTROPHILS NFR BLD AUTO: 69.4 % — SIGNIFICANT CHANGE UP (ref 43–77)
PLATELET # BLD AUTO: 119 K/UL — LOW (ref 150–400)
POTASSIUM SERPL-MCNC: 3.9 MMOL/L — SIGNIFICANT CHANGE UP (ref 3.5–5.3)
POTASSIUM SERPL-MCNC: 4.1 MMOL/L — SIGNIFICANT CHANGE UP (ref 3.5–5.3)
POTASSIUM SERPL-SCNC: 3.9 MMOL/L — SIGNIFICANT CHANGE UP (ref 3.5–5.3)
POTASSIUM SERPL-SCNC: 4.1 MMOL/L — SIGNIFICANT CHANGE UP (ref 3.5–5.3)
RBC # BLD: 3.47 M/UL — LOW (ref 3.8–5.2)
RBC # FLD: 15.5 % — HIGH (ref 10.3–14.5)
SODIUM SERPL-SCNC: 149 MMOL/L — HIGH (ref 135–145)
SODIUM SERPL-SCNC: 152 MMOL/L — HIGH (ref 135–145)
WBC # BLD: 13 K/UL — HIGH (ref 3.8–10.5)
WBC # FLD AUTO: 13 K/UL — HIGH (ref 3.8–10.5)

## 2017-01-20 RX ORDER — SODIUM CHLORIDE 9 MG/ML
1000 INJECTION, SOLUTION INTRAVENOUS
Qty: 0 | Refills: 0 | Status: COMPLETED | OUTPATIENT
Start: 2017-01-20 | End: 2017-01-21

## 2017-01-20 RX ADMIN — HEPARIN SODIUM 5000 UNIT(S): 5000 INJECTION INTRAVENOUS; SUBCUTANEOUS at 17:24

## 2017-01-20 RX ADMIN — PREGABALIN 100 MICROGRAM(S): 225 CAPSULE ORAL at 11:43

## 2017-01-20 RX ADMIN — MEROPENEM 200 MILLIGRAM(S): 1 INJECTION INTRAVENOUS at 06:04

## 2017-01-20 RX ADMIN — Medication 3 MILLILITER(S): at 14:19

## 2017-01-20 RX ADMIN — MEROPENEM 200 MILLIGRAM(S): 1 INJECTION INTRAVENOUS at 23:14

## 2017-01-20 RX ADMIN — Medication 0.5 MILLIGRAM(S): at 23:14

## 2017-01-20 RX ADMIN — Medication 3 MILLILITER(S): at 09:35

## 2017-01-20 RX ADMIN — SODIUM CHLORIDE 100 MILLILITER(S): 9 INJECTION, SOLUTION INTRAVENOUS at 23:14

## 2017-01-20 RX ADMIN — RISPERIDONE 2 MILLIGRAM(S): 4 TABLET ORAL at 17:23

## 2017-01-20 RX ADMIN — Medication 25 MILLIGRAM(S): at 17:24

## 2017-01-20 RX ADMIN — HEPARIN SODIUM 5000 UNIT(S): 5000 INJECTION INTRAVENOUS; SUBCUTANEOUS at 06:04

## 2017-01-20 RX ADMIN — SODIUM CHLORIDE 100 MILLILITER(S): 9 INJECTION, SOLUTION INTRAVENOUS at 10:20

## 2017-01-20 RX ADMIN — Medication 3 MILLILITER(S): at 21:47

## 2017-01-20 RX ADMIN — MEROPENEM 200 MILLIGRAM(S): 1 INJECTION INTRAVENOUS at 13:13

## 2017-01-20 RX ADMIN — Medication 3 MILLILITER(S): at 14:18

## 2017-01-20 RX ADMIN — PANTOPRAZOLE SODIUM 40 MILLIGRAM(S): 20 TABLET, DELAYED RELEASE ORAL at 06:04

## 2017-01-20 RX ADMIN — LORATADINE 10 MILLIGRAM(S): 10 TABLET ORAL at 11:44

## 2017-01-20 RX ADMIN — SIMVASTATIN 40 MILLIGRAM(S): 20 TABLET, FILM COATED ORAL at 23:14

## 2017-01-20 RX ADMIN — Medication 3 MILLILITER(S): at 03:14

## 2017-01-20 RX ADMIN — ANASTROZOLE 1 MILLIGRAM(S): 1 TABLET ORAL at 11:43

## 2017-01-20 RX ADMIN — Medication 3 MILLILITER(S): at 03:15

## 2017-01-20 RX ADMIN — Medication 25 MILLIGRAM(S): at 06:04

## 2017-01-20 RX ADMIN — RISPERIDONE 2 MILLIGRAM(S): 4 TABLET ORAL at 06:04

## 2017-01-21 LAB
ANION GAP SERPL CALC-SCNC: 2 MMOL/L — LOW (ref 5–17)
BASOPHILS # BLD AUTO: 0.1 K/UL — SIGNIFICANT CHANGE UP (ref 0–0.2)
BASOPHILS NFR BLD AUTO: 0.7 % — SIGNIFICANT CHANGE UP (ref 0–2)
BUN SERPL-MCNC: 16 MG/DL — SIGNIFICANT CHANGE UP (ref 7–18)
CALCIUM SERPL-MCNC: 11.2 MG/DL — HIGH (ref 8.4–10.5)
CHLORIDE SERPL-SCNC: 116 MMOL/L — HIGH (ref 96–108)
CO2 SERPL-SCNC: 31 MMOL/L — SIGNIFICANT CHANGE UP (ref 22–31)
CREAT SERPL-MCNC: 1.33 MG/DL — HIGH (ref 0.5–1.3)
CULTURE RESULTS: SIGNIFICANT CHANGE UP
CULTURE RESULTS: SIGNIFICANT CHANGE UP
EOSINOPHIL # BLD AUTO: 0.7 K/UL — HIGH (ref 0–0.5)
EOSINOPHIL NFR BLD AUTO: 5.4 % — SIGNIFICANT CHANGE UP (ref 0–6)
GLUCOSE SERPL-MCNC: 110 MG/DL — HIGH (ref 70–99)
HCT VFR BLD CALC: 31.1 % — LOW (ref 34.5–45)
HGB BLD-MCNC: 9.7 G/DL — LOW (ref 11.5–15.5)
LYMPHOCYTES # BLD AUTO: 19.1 % — SIGNIFICANT CHANGE UP (ref 13–44)
LYMPHOCYTES # BLD AUTO: 2.3 K/UL — SIGNIFICANT CHANGE UP (ref 1–3.3)
MCHC RBC-ENTMCNC: 28.1 PG — SIGNIFICANT CHANGE UP (ref 27–34)
MCHC RBC-ENTMCNC: 31.2 GM/DL — LOW (ref 32–36)
MCV RBC AUTO: 89.9 FL — SIGNIFICANT CHANGE UP (ref 80–100)
MONOCYTES # BLD AUTO: 0.5 K/UL — SIGNIFICANT CHANGE UP (ref 0–0.9)
MONOCYTES NFR BLD AUTO: 4.2 % — SIGNIFICANT CHANGE UP (ref 2–14)
NEUTROPHILS # BLD AUTO: 8.6 K/UL — HIGH (ref 1.8–7.4)
NEUTROPHILS NFR BLD AUTO: 70.6 % — SIGNIFICANT CHANGE UP (ref 43–77)
PLATELET # BLD AUTO: 129 K/UL — LOW (ref 150–400)
POTASSIUM SERPL-MCNC: 4.2 MMOL/L — SIGNIFICANT CHANGE UP (ref 3.5–5.3)
POTASSIUM SERPL-SCNC: 4.2 MMOL/L — SIGNIFICANT CHANGE UP (ref 3.5–5.3)
RBC # BLD: 3.46 M/UL — LOW (ref 3.8–5.2)
RBC # FLD: 15.4 % — HIGH (ref 10.3–14.5)
SODIUM SERPL-SCNC: 149 MMOL/L — HIGH (ref 135–145)
SPECIMEN SOURCE: SIGNIFICANT CHANGE UP
SPECIMEN SOURCE: SIGNIFICANT CHANGE UP
WBC # BLD: 12.2 K/UL — HIGH (ref 3.8–10.5)
WBC # FLD AUTO: 12.2 K/UL — HIGH (ref 3.8–10.5)

## 2017-01-21 RX ADMIN — PREGABALIN 100 MICROGRAM(S): 225 CAPSULE ORAL at 12:48

## 2017-01-21 RX ADMIN — Medication 3 MILLILITER(S): at 21:04

## 2017-01-21 RX ADMIN — Medication 25 MILLIGRAM(S): at 05:49

## 2017-01-21 RX ADMIN — SIMVASTATIN 40 MILLIGRAM(S): 20 TABLET, FILM COATED ORAL at 21:27

## 2017-01-21 RX ADMIN — SODIUM CHLORIDE 100 MILLILITER(S): 9 INJECTION, SOLUTION INTRAVENOUS at 05:50

## 2017-01-21 RX ADMIN — HEPARIN SODIUM 5000 UNIT(S): 5000 INJECTION INTRAVENOUS; SUBCUTANEOUS at 05:50

## 2017-01-21 RX ADMIN — SODIUM CHLORIDE 100 MILLILITER(S): 9 INJECTION, SOLUTION INTRAVENOUS at 23:06

## 2017-01-21 RX ADMIN — MEROPENEM 200 MILLIGRAM(S): 1 INJECTION INTRAVENOUS at 16:16

## 2017-01-21 RX ADMIN — ANASTROZOLE 1 MILLIGRAM(S): 1 TABLET ORAL at 12:49

## 2017-01-21 RX ADMIN — MEROPENEM 200 MILLIGRAM(S): 1 INJECTION INTRAVENOUS at 23:06

## 2017-01-21 RX ADMIN — PANTOPRAZOLE SODIUM 40 MILLIGRAM(S): 20 TABLET, DELAYED RELEASE ORAL at 05:49

## 2017-01-21 RX ADMIN — MEROPENEM 200 MILLIGRAM(S): 1 INJECTION INTRAVENOUS at 05:49

## 2017-01-21 RX ADMIN — LORATADINE 10 MILLIGRAM(S): 10 TABLET ORAL at 12:48

## 2017-01-21 RX ADMIN — Medication 25 MILLIGRAM(S): at 17:46

## 2017-01-21 RX ADMIN — HEPARIN SODIUM 5000 UNIT(S): 5000 INJECTION INTRAVENOUS; SUBCUTANEOUS at 17:46

## 2017-01-21 RX ADMIN — Medication 0.5 MILLIGRAM(S): at 21:27

## 2017-01-21 RX ADMIN — Medication 3 MILLILITER(S): at 21:03

## 2017-01-22 LAB
ANION GAP SERPL CALC-SCNC: 7 MMOL/L — SIGNIFICANT CHANGE UP (ref 5–17)
BASOPHILS # BLD AUTO: 0.1 K/UL — SIGNIFICANT CHANGE UP (ref 0–0.2)
BASOPHILS NFR BLD AUTO: 0.9 % — SIGNIFICANT CHANGE UP (ref 0–2)
BUN SERPL-MCNC: 18 MG/DL — SIGNIFICANT CHANGE UP (ref 7–18)
CALCIUM SERPL-MCNC: 10.9 MG/DL — HIGH (ref 8.4–10.5)
CHLORIDE SERPL-SCNC: 115 MMOL/L — HIGH (ref 96–108)
CO2 SERPL-SCNC: 27 MMOL/L — SIGNIFICANT CHANGE UP (ref 22–31)
CREAT SERPL-MCNC: 1.3 MG/DL — SIGNIFICANT CHANGE UP (ref 0.5–1.3)
EOSINOPHIL # BLD AUTO: 0.6 K/UL — HIGH (ref 0–0.5)
EOSINOPHIL NFR BLD AUTO: 4.8 % — SIGNIFICANT CHANGE UP (ref 0–6)
GLUCOSE SERPL-MCNC: 114 MG/DL — HIGH (ref 70–99)
HCT VFR BLD CALC: 32.8 % — LOW (ref 34.5–45)
HGB BLD-MCNC: 10.2 G/DL — LOW (ref 11.5–15.5)
LYMPHOCYTES # BLD AUTO: 2.8 K/UL — SIGNIFICANT CHANGE UP (ref 1–3.3)
LYMPHOCYTES # BLD AUTO: 21.6 % — SIGNIFICANT CHANGE UP (ref 13–44)
MCHC RBC-ENTMCNC: 27.9 PG — SIGNIFICANT CHANGE UP (ref 27–34)
MCHC RBC-ENTMCNC: 31 GM/DL — LOW (ref 32–36)
MCV RBC AUTO: 90 FL — SIGNIFICANT CHANGE UP (ref 80–100)
MONOCYTES # BLD AUTO: 0.5 K/UL — SIGNIFICANT CHANGE UP (ref 0–0.9)
MONOCYTES NFR BLD AUTO: 3.8 % — SIGNIFICANT CHANGE UP (ref 2–14)
NEUTROPHILS # BLD AUTO: 8.9 K/UL — HIGH (ref 1.8–7.4)
NEUTROPHILS NFR BLD AUTO: 68.9 % — SIGNIFICANT CHANGE UP (ref 43–77)
PLATELET # BLD AUTO: 141 K/UL — LOW (ref 150–400)
POTASSIUM SERPL-MCNC: 4.3 MMOL/L — SIGNIFICANT CHANGE UP (ref 3.5–5.3)
POTASSIUM SERPL-SCNC: 4.3 MMOL/L — SIGNIFICANT CHANGE UP (ref 3.5–5.3)
RBC # BLD: 3.64 M/UL — LOW (ref 3.8–5.2)
RBC # FLD: 15.6 % — HIGH (ref 10.3–14.5)
SODIUM SERPL-SCNC: 149 MMOL/L — HIGH (ref 135–145)
WBC # BLD: 12.8 K/UL — HIGH (ref 3.8–10.5)
WBC # FLD AUTO: 12.8 K/UL — HIGH (ref 3.8–10.5)

## 2017-01-22 RX ADMIN — MEROPENEM 200 MILLIGRAM(S): 1 INJECTION INTRAVENOUS at 13:58

## 2017-01-22 RX ADMIN — ANASTROZOLE 1 MILLIGRAM(S): 1 TABLET ORAL at 13:57

## 2017-01-22 RX ADMIN — SIMVASTATIN 40 MILLIGRAM(S): 20 TABLET, FILM COATED ORAL at 21:12

## 2017-01-22 RX ADMIN — MEROPENEM 200 MILLIGRAM(S): 1 INJECTION INTRAVENOUS at 06:28

## 2017-01-22 RX ADMIN — LORATADINE 10 MILLIGRAM(S): 10 TABLET ORAL at 13:57

## 2017-01-22 RX ADMIN — MEROPENEM 200 MILLIGRAM(S): 1 INJECTION INTRAVENOUS at 21:13

## 2017-01-22 RX ADMIN — PREGABALIN 100 MICROGRAM(S): 225 CAPSULE ORAL at 13:57

## 2017-01-22 RX ADMIN — HEPARIN SODIUM 5000 UNIT(S): 5000 INJECTION INTRAVENOUS; SUBCUTANEOUS at 06:28

## 2017-01-22 RX ADMIN — Medication 3 MILLILITER(S): at 22:08

## 2017-01-22 RX ADMIN — HEPARIN SODIUM 5000 UNIT(S): 5000 INJECTION INTRAVENOUS; SUBCUTANEOUS at 18:41

## 2017-01-22 RX ADMIN — Medication 25 MILLIGRAM(S): at 18:41

## 2017-01-22 RX ADMIN — PANTOPRAZOLE SODIUM 40 MILLIGRAM(S): 20 TABLET, DELAYED RELEASE ORAL at 06:28

## 2017-01-22 RX ADMIN — Medication 25 MILLIGRAM(S): at 06:28

## 2017-01-22 RX ADMIN — Medication 0.5 MILLIGRAM(S): at 21:12

## 2017-01-23 LAB
ANION GAP SERPL CALC-SCNC: 4 MMOL/L — LOW (ref 5–17)
BASOPHILS # BLD AUTO: 0.1 K/UL — SIGNIFICANT CHANGE UP (ref 0–0.2)
BASOPHILS NFR BLD AUTO: 0.6 % — SIGNIFICANT CHANGE UP (ref 0–2)
BUN SERPL-MCNC: 16 MG/DL — SIGNIFICANT CHANGE UP (ref 7–18)
CALCIUM SERPL-MCNC: 11 MG/DL — HIGH (ref 8.4–10.5)
CHLORIDE SERPL-SCNC: 117 MMOL/L — HIGH (ref 96–108)
CO2 SERPL-SCNC: 29 MMOL/L — SIGNIFICANT CHANGE UP (ref 22–31)
CREAT SERPL-MCNC: 1.19 MG/DL — SIGNIFICANT CHANGE UP (ref 0.5–1.3)
EOSINOPHIL # BLD AUTO: 0.5 K/UL — SIGNIFICANT CHANGE UP (ref 0–0.5)
EOSINOPHIL NFR BLD AUTO: 4.7 % — SIGNIFICANT CHANGE UP (ref 0–6)
GLUCOSE SERPL-MCNC: 97 MG/DL — SIGNIFICANT CHANGE UP (ref 70–99)
HCT VFR BLD CALC: 32.5 % — LOW (ref 34.5–45)
HGB BLD-MCNC: 10.1 G/DL — LOW (ref 11.5–15.5)
LYMPHOCYTES # BLD AUTO: 1.7 K/UL — SIGNIFICANT CHANGE UP (ref 1–3.3)
LYMPHOCYTES # BLD AUTO: 16.9 % — SIGNIFICANT CHANGE UP (ref 13–44)
MCHC RBC-ENTMCNC: 28 PG — SIGNIFICANT CHANGE UP (ref 27–34)
MCHC RBC-ENTMCNC: 31 GM/DL — LOW (ref 32–36)
MCV RBC AUTO: 90.3 FL — SIGNIFICANT CHANGE UP (ref 80–100)
MONOCYTES # BLD AUTO: 0.4 K/UL — SIGNIFICANT CHANGE UP (ref 0–0.9)
MONOCYTES NFR BLD AUTO: 4.4 % — SIGNIFICANT CHANGE UP (ref 2–14)
NEUTROPHILS # BLD AUTO: 7.3 K/UL — SIGNIFICANT CHANGE UP (ref 1.8–7.4)
NEUTROPHILS NFR BLD AUTO: 73.4 % — SIGNIFICANT CHANGE UP (ref 43–77)
PLATELET # BLD AUTO: 191 K/UL — SIGNIFICANT CHANGE UP (ref 150–400)
POTASSIUM SERPL-MCNC: 4.1 MMOL/L — SIGNIFICANT CHANGE UP (ref 3.5–5.3)
POTASSIUM SERPL-SCNC: 4.1 MMOL/L — SIGNIFICANT CHANGE UP (ref 3.5–5.3)
RBC # BLD: 3.59 M/UL — LOW (ref 3.8–5.2)
RBC # FLD: 15.7 % — HIGH (ref 10.3–14.5)
SODIUM SERPL-SCNC: 150 MMOL/L — HIGH (ref 135–145)
WBC # BLD: 10 K/UL — SIGNIFICANT CHANGE UP (ref 3.8–10.5)
WBC # FLD AUTO: 10 K/UL — SIGNIFICANT CHANGE UP (ref 3.8–10.5)

## 2017-01-23 RX ORDER — SODIUM CHLORIDE 9 MG/ML
1000 INJECTION, SOLUTION INTRAVENOUS
Qty: 0 | Refills: 0 | Status: DISCONTINUED | OUTPATIENT
Start: 2017-01-23 | End: 2017-01-25

## 2017-01-23 RX ORDER — CALCITONIN SALMON 200 [IU]/ML
280 INJECTION, SOLUTION INTRAMUSCULAR EVERY 12 HOURS
Qty: 0 | Refills: 0 | Status: DISCONTINUED | OUTPATIENT
Start: 2017-01-23 | End: 2017-01-25

## 2017-01-23 RX ORDER — ERTAPENEM SODIUM 1 G/1
1000 INJECTION, POWDER, LYOPHILIZED, FOR SOLUTION INTRAMUSCULAR; INTRAVENOUS EVERY 24 HOURS
Qty: 0 | Refills: 0 | Status: DISCONTINUED | OUTPATIENT
Start: 2017-01-23 | End: 2017-01-24

## 2017-01-23 RX ADMIN — Medication 3 MILLILITER(S): at 08:49

## 2017-01-23 RX ADMIN — MEROPENEM 200 MILLIGRAM(S): 1 INJECTION INTRAVENOUS at 06:47

## 2017-01-23 RX ADMIN — HEPARIN SODIUM 5000 UNIT(S): 5000 INJECTION INTRAVENOUS; SUBCUTANEOUS at 17:26

## 2017-01-23 RX ADMIN — Medication 0.5 MILLIGRAM(S): at 23:50

## 2017-01-23 RX ADMIN — Medication 3 MILLILITER(S): at 14:01

## 2017-01-23 RX ADMIN — SODIUM CHLORIDE 75 MILLILITER(S): 9 INJECTION, SOLUTION INTRAVENOUS at 23:51

## 2017-01-23 RX ADMIN — Medication 3 MILLILITER(S): at 22:27

## 2017-01-23 RX ADMIN — HEPARIN SODIUM 5000 UNIT(S): 5000 INJECTION INTRAVENOUS; SUBCUTANEOUS at 06:47

## 2017-01-23 RX ADMIN — Medication 25 MILLIGRAM(S): at 06:47

## 2017-01-23 RX ADMIN — RISPERIDONE 2 MILLIGRAM(S): 4 TABLET ORAL at 17:26

## 2017-01-23 RX ADMIN — ANASTROZOLE 1 MILLIGRAM(S): 1 TABLET ORAL at 11:48

## 2017-01-23 RX ADMIN — ERTAPENEM SODIUM 120 MILLIGRAM(S): 1 INJECTION, POWDER, LYOPHILIZED, FOR SOLUTION INTRAMUSCULAR; INTRAVENOUS at 11:47

## 2017-01-23 RX ADMIN — PANTOPRAZOLE SODIUM 40 MILLIGRAM(S): 20 TABLET, DELAYED RELEASE ORAL at 06:47

## 2017-01-23 RX ADMIN — PREGABALIN 100 MICROGRAM(S): 225 CAPSULE ORAL at 11:47

## 2017-01-23 RX ADMIN — SIMVASTATIN 40 MILLIGRAM(S): 20 TABLET, FILM COATED ORAL at 23:50

## 2017-01-23 RX ADMIN — Medication 25 MILLIGRAM(S): at 17:27

## 2017-01-23 RX ADMIN — LORATADINE 10 MILLIGRAM(S): 10 TABLET ORAL at 11:48

## 2017-01-23 RX ADMIN — Medication 3 MILLILITER(S): at 08:48

## 2017-01-23 RX ADMIN — Medication 3 MILLILITER(S): at 22:26

## 2017-01-23 NOTE — SWALLOW BEDSIDE ASSESSMENT ADULT - ORAL PHASE
Decreased anterior-posterior movement of the bolus/Delayed oral transit time Decreased anterior-posterior movement of the bolus/Delayed oral transit time/Lingual stasis

## 2017-01-23 NOTE — SWALLOW BEDSIDE ASSESSMENT ADULT - PHARYNGEAL PHASE
Delayed pharyngeal swallow/Decreased laryngeal elevation Decreased laryngeal elevation/Delayed pharyngeal swallow Decreased laryngeal elevation/Multiple swallows/Delayed pharyngeal swallow/Wet vocal quality post oral intake

## 2017-01-23 NOTE — SWALLOW BEDSIDE ASSESSMENT ADULT - COMMENTS
Pt seen for bedside swallow evaluation, A+Ox0-1 ("Elva" only),  HOB elevated to 45 degrees. Minimally verbal, output primarily mumbling.

## 2017-01-23 NOTE — SWALLOW BEDSIDE ASSESSMENT ADULT - SWALLOW EVAL: DIAGNOSIS
Pt p/w s&s oropharyngeal dysphagia weak labial seal, impaired bolus formation, slow A-P transport, oral stasis, delayed swallow reflex, reduced hyolaryngeal elevation, and wet vocal quality/respiration post-swallow of thin liquids. Unable to clear solids residue despite multiple cues + liquid wash.

## 2017-01-23 NOTE — SWALLOW BEDSIDE ASSESSMENT ADULT - ORAL PREPARATORY PHASE
Reduced oral grading Within functional limits Anterior loss of bolus Decreased mastication ability/Reduced oral grading

## 2017-01-23 NOTE — SWALLOW BEDSIDE ASSESSMENT ADULT - ASR SWALLOW ASPIRATION MONITOR
upper respiratory infection/gurgly voice/position upright (90Y)/oral hygiene/fever/pneumonia/throat clearing/change of breathing pattern/cough

## 2017-01-23 NOTE — SWALLOW BEDSIDE ASSESSMENT ADULT - SWALLOW EVAL: RECOMMENDED FEEDING/EATING TECHNIQUES
maintain upright posture during/after eating for 30 mins/allow for swallow between intakes/crush medication (when feasible)/small sips/bites/check mouth frequently for oral residue/pocketing/oral hygiene/alternate food with liquid/no straws

## 2017-01-23 NOTE — SWALLOW BEDSIDE ASSESSMENT ADULT - SLP PERTINENT HISTORY OF CURRENT PROBLEM
77 y/o F from [facility]. PMH of COPD, anemia, Parkinson's disease, HLD, Bipolar Disorder (on lithium), Breast Cancer and TAMARA. Patient was sent from nursing home for abdominal pain and SOB at the NH.

## 2017-01-24 LAB
ANION GAP SERPL CALC-SCNC: 7 MMOL/L — SIGNIFICANT CHANGE UP (ref 5–17)
BASOPHILS # BLD AUTO: 0.1 K/UL — SIGNIFICANT CHANGE UP (ref 0–0.2)
BASOPHILS NFR BLD AUTO: 1.2 % — SIGNIFICANT CHANGE UP (ref 0–2)
BUN SERPL-MCNC: 16 MG/DL — SIGNIFICANT CHANGE UP (ref 7–18)
CA-I BLD-SCNC: 1.6 MMOL/L — HIGH (ref 1.05–1.34)
CALCIUM SERPL-MCNC: 11 MG/DL — HIGH (ref 8.4–10.5)
CALCIUM SERPL-MCNC: 11.4 MG/DL — HIGH (ref 8.4–10.5)
CHLORIDE SERPL-SCNC: 114 MMOL/L — HIGH (ref 96–108)
CO2 SERPL-SCNC: 29 MMOL/L — SIGNIFICANT CHANGE UP (ref 22–31)
CREAT SERPL-MCNC: 1.18 MG/DL — SIGNIFICANT CHANGE UP (ref 0.5–1.3)
EOSINOPHIL # BLD AUTO: 0.4 K/UL — SIGNIFICANT CHANGE UP (ref 0–0.5)
EOSINOPHIL NFR BLD AUTO: 4 % — SIGNIFICANT CHANGE UP (ref 0–6)
GLUCOSE SERPL-MCNC: 125 MG/DL — HIGH (ref 70–99)
HCT VFR BLD CALC: 31.9 % — LOW (ref 34.5–45)
HGB BLD-MCNC: 9.9 G/DL — LOW (ref 11.5–15.5)
LYMPHOCYTES # BLD AUTO: 2.4 K/UL — SIGNIFICANT CHANGE UP (ref 1–3.3)
LYMPHOCYTES # BLD AUTO: 23.4 % — SIGNIFICANT CHANGE UP (ref 13–44)
MCHC RBC-ENTMCNC: 28.3 PG — SIGNIFICANT CHANGE UP (ref 27–34)
MCHC RBC-ENTMCNC: 30.9 GM/DL — LOW (ref 32–36)
MCV RBC AUTO: 91.6 FL — SIGNIFICANT CHANGE UP (ref 80–100)
MONOCYTES # BLD AUTO: 0.6 K/UL — SIGNIFICANT CHANGE UP (ref 0–0.9)
MONOCYTES NFR BLD AUTO: 6.3 % — SIGNIFICANT CHANGE UP (ref 2–14)
NEUTROPHILS # BLD AUTO: 6.7 K/UL — SIGNIFICANT CHANGE UP (ref 1.8–7.4)
NEUTROPHILS NFR BLD AUTO: 65.2 % — SIGNIFICANT CHANGE UP (ref 43–77)
PLATELET # BLD AUTO: 237 K/UL — SIGNIFICANT CHANGE UP (ref 150–400)
POTASSIUM SERPL-MCNC: 4.3 MMOL/L — SIGNIFICANT CHANGE UP (ref 3.5–5.3)
POTASSIUM SERPL-SCNC: 4.3 MMOL/L — SIGNIFICANT CHANGE UP (ref 3.5–5.3)
PTH-INTACT FLD-MCNC: 37 PG/ML — SIGNIFICANT CHANGE UP (ref 15–65)
PTH-INTACT FLD-MCNC: 39 PG/ML — SIGNIFICANT CHANGE UP (ref 15–65)
RBC # BLD: 3.48 M/UL — LOW (ref 3.8–5.2)
RBC # FLD: 16.1 % — HIGH (ref 10.3–14.5)
SODIUM SERPL-SCNC: 150 MMOL/L — HIGH (ref 135–145)
WBC # BLD: 10.2 K/UL — SIGNIFICANT CHANGE UP (ref 3.8–10.5)
WBC # FLD AUTO: 10.2 K/UL — SIGNIFICANT CHANGE UP (ref 3.8–10.5)

## 2017-01-24 RX ORDER — ANASTROZOLE 1 MG/1
1 TABLET ORAL
Qty: 0 | Refills: 0 | COMMUNITY
Start: 2017-01-24

## 2017-01-24 RX ORDER — IPRATROPIUM/ALBUTEROL SULFATE 18-103MCG
3 AEROSOL WITH ADAPTER (GRAM) INHALATION
Qty: 0 | Refills: 0 | COMMUNITY

## 2017-01-24 RX ORDER — RISPERIDONE 4 MG/1
1 TABLET ORAL
Qty: 0 | Refills: 0 | COMMUNITY
Start: 2017-01-24

## 2017-01-24 RX ORDER — MEROPENEM 1 G/30ML
1000 INJECTION INTRAVENOUS
Qty: 0 | Refills: 0 | COMMUNITY
Start: 2017-01-24 | End: 2017-01-27

## 2017-01-24 RX ORDER — ERTAPENEM SODIUM 1 G/1
1 INJECTION, POWDER, LYOPHILIZED, FOR SOLUTION INTRAMUSCULAR; INTRAVENOUS
Qty: 0 | Refills: 0 | COMMUNITY
Start: 2017-01-24

## 2017-01-24 RX ORDER — SIMVASTATIN 20 MG/1
1 TABLET, FILM COATED ORAL
Qty: 0 | Refills: 0 | COMMUNITY
Start: 2017-01-24

## 2017-01-24 RX ORDER — BENZTROPINE MESYLATE 1 MG
1 TABLET ORAL
Qty: 0 | Refills: 0 | COMMUNITY
Start: 2017-01-24

## 2017-01-24 RX ORDER — BETHANECHOL CHLORIDE 25 MG
1 TABLET ORAL
Qty: 0 | Refills: 0 | COMMUNITY
Start: 2017-01-24

## 2017-01-24 RX ORDER — CALCITONIN SALMON 200 [IU]/ML
280 INJECTION, SOLUTION INTRAMUSCULAR
Qty: 0 | Refills: 0 | COMMUNITY
Start: 2017-01-24

## 2017-01-24 RX ORDER — MEROPENEM 1 G/30ML
1000 INJECTION INTRAVENOUS EVERY 8 HOURS
Qty: 0 | Refills: 0 | Status: DISCONTINUED | OUTPATIENT
Start: 2017-01-24 | End: 2017-01-25

## 2017-01-24 RX ORDER — LORATADINE 10 MG/1
1 TABLET ORAL
Qty: 0 | Refills: 0 | COMMUNITY
Start: 2017-01-24

## 2017-01-24 RX ORDER — MEROPENEM 1 G/30ML
1000 INJECTION INTRAVENOUS
Qty: 0 | Refills: 0 | COMMUNITY
Start: 2017-01-24

## 2017-01-24 RX ORDER — ACETYLCYSTEINE 200 MG/ML
3 VIAL (ML) MISCELLANEOUS
Qty: 0 | Refills: 0 | COMMUNITY
Start: 2017-01-24

## 2017-01-24 RX ORDER — BENZTROPINE MESYLATE 1 MG
1 TABLET ORAL
Qty: 0 | Refills: 0 | COMMUNITY

## 2017-01-24 RX ORDER — IPRATROPIUM/ALBUTEROL SULFATE 18-103MCG
3 AEROSOL WITH ADAPTER (GRAM) INHALATION
Qty: 0 | Refills: 0 | COMMUNITY
Start: 2017-01-24

## 2017-01-24 RX ORDER — PREGABALIN 225 MG/1
1 CAPSULE ORAL
Qty: 0 | Refills: 0 | COMMUNITY
Start: 2017-01-24

## 2017-01-24 RX ADMIN — ERGOCALCIFEROL 50000 UNIT(S): 1.25 CAPSULE ORAL at 12:57

## 2017-01-24 RX ADMIN — SIMVASTATIN 40 MILLIGRAM(S): 20 TABLET, FILM COATED ORAL at 22:58

## 2017-01-24 RX ADMIN — HEPARIN SODIUM 5000 UNIT(S): 5000 INJECTION INTRAVENOUS; SUBCUTANEOUS at 06:24

## 2017-01-24 RX ADMIN — Medication 0.5 MILLIGRAM(S): at 22:58

## 2017-01-24 RX ADMIN — Medication 3 MILLILITER(S): at 09:11

## 2017-01-24 RX ADMIN — RISPERIDONE 2 MILLIGRAM(S): 4 TABLET ORAL at 18:13

## 2017-01-24 RX ADMIN — ANASTROZOLE 1 MILLIGRAM(S): 1 TABLET ORAL at 12:57

## 2017-01-24 RX ADMIN — CALCITONIN SALMON 280 INTERNATIONAL UNIT(S): 200 INJECTION, SOLUTION INTRAMUSCULAR at 18:04

## 2017-01-24 RX ADMIN — Medication 25 MILLIGRAM(S): at 18:05

## 2017-01-24 RX ADMIN — MEROPENEM 200 MILLIGRAM(S): 1 INJECTION INTRAVENOUS at 22:58

## 2017-01-24 RX ADMIN — PANTOPRAZOLE SODIUM 40 MILLIGRAM(S): 20 TABLET, DELAYED RELEASE ORAL at 06:24

## 2017-01-24 RX ADMIN — Medication 3 MILLILITER(S): at 15:17

## 2017-01-24 RX ADMIN — SODIUM CHLORIDE 75 MILLILITER(S): 9 INJECTION, SOLUTION INTRAVENOUS at 06:24

## 2017-01-24 RX ADMIN — HEPARIN SODIUM 5000 UNIT(S): 5000 INJECTION INTRAVENOUS; SUBCUTANEOUS at 18:04

## 2017-01-24 RX ADMIN — Medication 3 MILLILITER(S): at 22:25

## 2017-01-24 RX ADMIN — RISPERIDONE 2 MILLIGRAM(S): 4 TABLET ORAL at 06:24

## 2017-01-24 RX ADMIN — MEROPENEM 200 MILLIGRAM(S): 1 INJECTION INTRAVENOUS at 18:04

## 2017-01-24 RX ADMIN — CALCITONIN SALMON 280 INTERNATIONAL UNIT(S): 200 INJECTION, SOLUTION INTRAMUSCULAR at 06:25

## 2017-01-24 RX ADMIN — Medication 3 MILLILITER(S): at 22:26

## 2017-01-24 RX ADMIN — LORATADINE 10 MILLIGRAM(S): 10 TABLET ORAL at 12:57

## 2017-01-24 RX ADMIN — Medication 25 MILLIGRAM(S): at 06:24

## 2017-01-24 RX ADMIN — PREGABALIN 100 MICROGRAM(S): 225 CAPSULE ORAL at 12:57

## 2017-01-24 NOTE — DISCHARGE NOTE ADULT - PLAN OF CARE
stop taking lithium follow up with psych as outpatient to keep levels stable avoid medications that are toxic to kidneys to finish 3 more days of IV antibiotics keep lithium levels checked and theraputic, repeat blood level every week. As per Dr. Hoskins to restart lithum 300mg ER once a day at night to keep shaking stable, take Artane 1mg by mouth 3 times a day

## 2017-01-24 NOTE — DISCHARGE NOTE ADULT - HOSPITAL COURSE
77 y/o F from [facility]. PMH of COPD, anemia, Parkinson's disease, HLD, Bipolar Disorder (on lithium), Breast Cancer, CKD and recent discharge from [facility] (Dec 22) after treatment for UTI and pneumonia was sent back for change in mental state described as lethargy and drooling.     Hospital course:   1.AMS: likely multifactorial (MDR E.Coli UTI,  Lithium toxicity), resolved  -Hemodynamically stable, Lithium level 2.2 ? 1.5  -CT head negative for acute pathology and Echo wnl  -Psych Dr. Hoskins; recs to dc lithium completely  -ID Dr. Phan; recommends PICC line  -UA WBC >50 and LE +’ve and Urine Cx >100K MDR E.coli  -Blood Cx neg  -discontinued Rocephin (given total of 3 days)  -continue with Invanz for 3 more days via PIV in rehab  -placed on contact isolation   -given Tylenol prn fevers     2. TAMARA/CKD: likely pre-renal, improving   -BUN/Cr 28/2.05 ? Cr. 1.8 ? 1.5 today (baseline 1.5-1.8)  -Total U.Protein 15, U. Cr 17  -Renal US: right renal cyst, otherwise unremarkable   -given NS IVF hydration @75cc/hr x 24hrs    3. Diarrhea: likely infectious, will r/o c.diff    4. Hypercalcemia: likely 2/2 Lithium   -Ca 11.3 and Albumin 3.0, corrected Ca 12.1  -c/w IVF hydration for now  -Endo Dr. Barros consulted; recommends Calcitrol     5. COPD: stable, c/w duoneb   - continue Duonebs as needed.     6. Bipolar: as per Dr. Valencia, c/w Respiradol and to add haldol 1mg IV q12 prn agitation. Discontinue lithium completely, Li level 2.2 ? 1.8? 1.5  ? 1.2    7. Parkinson’s disease: cogentin was initially given but held as she developed EPS symptoms  8. Normocytic Anemia: stable h/h, will continue w/ iron and  monitor.   9. GI/DVT Ppx given  10. Dispo: PT recommends GINNY    Patient is medically stable for discharge, attending made aware. 75 y/o F from [facility]. PMH of COPD, anemia, Parkinson's disease, HLD, Bipolar Disorder (on lithium), Breast Cancer, CKD and recent discharge from [facility] (Dec 22) after treatment for UTI and pneumonia was sent back for change in mental state described as lethargy and drooling.     Hospital course:   1.AMS: likely multifactorial (MDR E.Coli UTI,  Lithium toxicity), resolved  -Hemodynamically stable, Lithium level 2.2 ? 1.5  -CT head negative for acute pathology and Echo wnl  -Psych Dr. Hoskins; recs now to restart lithum 300mg ER qday  -ID Dr. Phan; recommends PICC line  -UA WBC >50 and LE +’ve and Urine Cx >100K MDR E.coli  -Blood Cx neg  -discontinued Rocephin (given total of 3 days)  -continue with Invanz for 3 more days via PIV in rehab  -placed on contact isolation   -given Tylenol prn fevers     2. TAMARA/CKD: likely pre-renal, improving   -BUN/Cr 28/2.05 ? Cr. 1.8 ? 1.5 today (baseline 1.5-1.8)  -Total U.Protein 15, U. Cr 17  -Renal US: right renal cyst, otherwise unremarkable   -given NS IVF hydration @75cc/hr x 24hrs    3. Diarrhea: likely infectious, will r/o c.diff    4. Hypercalcemia: likely 2/2 Lithium   -Ca 11.3 and Albumin 3.0, corrected Ca 12.1  -c/w IVF hydration for now  -Endo Dr. Barros consulted; recommends Calcitrol     5. COPD: stable, c/w duoneb   - continue Duonebs as needed.     6. Bipolar: as per Dr. Valencia, c/w Respiradol and to add haldol 1mg IV q12 prn agitation. Discontinue lithium completely, Li level 2.2 ? 1.8? 1.5  ? 1.2    7. Parkinson’s disease: cogentin was initially given but held as she developed EPS symptoms  8. Normocytic Anemia: stable h/h, will continue w/ iron and  monitor.   9. GI/DVT Ppx given  10. Dispo: PT recommends GINNY    Patient is medically stable for discharge, attending made aware.

## 2017-01-24 NOTE — DISCHARGE NOTE ADULT - SECONDARY DIAGNOSIS.
Hypercalcemia Renal insufficiency MDRO (multiple drug resistant organisms) resistance Bipolar 1 disorder Parkinson's disease

## 2017-01-24 NOTE — DISCHARGE NOTE ADULT - CARE PLAN
Principal Discharge DX:	Altered mental status  Goal:	stop taking lithium  Instructions for follow-up, activity and diet:	follow up with psych as outpatient  Secondary Diagnosis:	Hypercalcemia  Goal:	to keep levels stable  Secondary Diagnosis:	Renal insufficiency  Goal:	avoid medications that are toxic to kidneys  Secondary Diagnosis:	MDRO (multiple drug resistant organisms) resistance  Goal:	to finish 3 more days of IV antibiotics Principal Discharge DX:	Altered mental status  Goal:	stop taking lithium  Instructions for follow-up, activity and diet:	follow up with psych as outpatient  Secondary Diagnosis:	Hypercalcemia  Goal:	to keep levels stable  Secondary Diagnosis:	Renal insufficiency  Goal:	avoid medications that are toxic to kidneys  Secondary Diagnosis:	MDRO (multiple drug resistant organisms) resistance  Goal:	to finish 3 more days of IV antibiotics  Secondary Diagnosis:	Bipolar 1 disorder  Goal:	keep lithium levels checked and theraputic, repeat blood level every week.  Instructions for follow-up, activity and diet:	As per Dr. Hoskins to restart lithum 300mg ER once a day at night  Secondary Diagnosis:	Parkinson's disease  Goal:	to keep shaking stable, take Artane 1mg by mouth 3 times a day

## 2017-01-24 NOTE — DISCHARGE NOTE ADULT - MEDICATION SUMMARY - MEDICATIONS TO TAKE
I will START or STAY ON the medications listed below when I get home from the hospital:    acetylcysteine 10% inhalation solution  -- 3 milliliter(s) inhaled 3 times a day  -- Indication: For lung    loratadine 10 mg oral tablet  -- 1 tab(s) by mouth once a day  -- Indication: For lung    simvastatin 40 mg oral tablet  -- 1 tab(s) by mouth once a day (at bedtime)  -- Indication: For lipid    anastrozole 1 mg oral tablet  -- 1 tab(s) by mouth once a day  -- Indication: For Cancer    benztropine 0.5 mg oral tablet  -- 1 tab(s) by mouth once a day (at bedtime)  -- Indication: For Cns    risperiDONE 2 mg oral tablet  -- 1 tab(s) by mouth 2 times a day  -- Indication: For biopolar    albuterol-ipratropium 2.5 mg-0.5 mg/3 mL inhalation solution  -- 3 milliliter(s) inhaled every 6 hours  -- Indication: For lung    calcitonin 200 intl units/mL injectable solution  -- 280 unit(s) injectable 2 times a day stop after 4 doses  -- Indication: For Hypercalcemia    ertapenem 1 g injection  --  injectable for 3 more days  -- Indication: For MDRE UTI    bethanechol 25 mg oral tablet  -- 1 tab(s) by mouth 2 times a day  -- Indication: For Urine    Acidophilus oral capsule  -- 1 cap(s) by mouth once a day  -- Indication: For GI    omeprazole 20 mg oral delayed release tablet  -- 1 tab(s) by mouth once a day  -- Indication: For GERD    cyanocobalamin 100 mcg oral tablet  -- 1 tab(s) by mouth once a day  -- Indication: For Replace I will START or STAY ON the medications listed below when I get home from the hospital:    acetylcysteine 10% inhalation solution  -- 3 milliliter(s) inhaled 3 times a day  -- Indication: For lung    loratadine 10 mg oral tablet  -- 1 tab(s) by mouth once a day  -- Indication: For lung    simvastatin 40 mg oral tablet  -- 1 tab(s) by mouth once a day (at bedtime)  -- Indication: For lipid    anastrozole 1 mg oral tablet  -- 1 tab(s) by mouth once a day  -- Indication: For Cancer    benztropine 0.5 mg oral tablet  -- 1 tab(s) by mouth once a day (at bedtime)  -- Indication: For Cns    risperiDONE 2 mg oral tablet  -- 1 tab(s) by mouth 2 times a day  -- Indication: For biopolar    albuterol-ipratropium 2.5 mg-0.5 mg/3 mL inhalation solution  -- 3 milliliter(s) inhaled every 6 hours  -- Indication: For lung    calcitonin 200 intl units/mL injectable solution  -- 280 unit(s) injectable 2 times a day stop after 4 doses  -- Indication: For Hypercalcemia    meropenem 1000 mg intravenous injection  -- 1000 milligram(s) intravenous every 8 hours x 3 more days  -- Indication: For MDRE    bethanechol 25 mg oral tablet  -- 1 tab(s) by mouth 2 times a day  -- Indication: For Urine    Acidophilus oral capsule  -- 1 cap(s) by mouth once a day  -- Indication: For GI    omeprazole 20 mg oral delayed release tablet  -- 1 tab(s) by mouth once a day  -- Indication: For GERD    cyanocobalamin 100 mcg oral tablet  -- 1 tab(s) by mouth once a day  -- Indication: For Replace I will START or STAY ON the medications listed below when I get home from the hospital:    acetylcysteine 10% inhalation solution  -- 3 milliliter(s) inhaled 3 times a day  -- Indication: For lung    loratadine 10 mg oral tablet  -- 1 tab(s) by mouth once a day  -- Indication: For lung    simvastatin 40 mg oral tablet  -- 1 tab(s) by mouth once a day (at bedtime)  -- Indication: For lipid    anastrozole 1 mg oral tablet  -- 1 tab(s) by mouth once a day  -- Indication: For Cancer    trihexyphenidyl  -- 1 milligram(s) by mouth 3 times a day  -- Indication: For Parkinson's disease    lithium 300 mg oral capsule  -- 1 cap(s) by mouth once a day (at bedtime)  -- Indication: For Depression    risperiDONE 1 mg oral tablet  -- 1 tab(s) by mouth 2 times a day  -- Indication: For Depression    albuterol-ipratropium 2.5 mg-0.5 mg/3 mL inhalation solution  -- 3 milliliter(s) inhaled every 6 hours  -- Indication: For lung    calcitonin 200 intl units/mL injectable solution  -- 280 unit(s) injectable 2 times a day stop after 4 doses  -- Indication: For Hypercalcemia    meropenem 1000 mg intravenous injection  -- 1000 milligram(s) intravenous every 8 hours x 3 more days  -- Indication: For MDRO (multiple drug resistant organisms) resistance    bethanechol 25 mg oral tablet  -- 1 tab(s) by mouth 2 times a day  -- Indication: For Urine    Acidophilus oral capsule  -- 1 cap(s) by mouth once a day  -- Indication: For GI    omeprazole 20 mg oral delayed release tablet  -- 1 tab(s) by mouth once a day  -- Indication: For GERD    cyanocobalamin 100 mcg oral tablet  -- 1 tab(s) by mouth once a day  -- Indication: For Replace

## 2017-01-24 NOTE — DISCHARGE NOTE ADULT - MEDICATION SUMMARY - MEDICATIONS TO STOP TAKING
I will STOP taking the medications listed below when I get home from the hospital:    amoxicillin-clavulanate 875 mg-125 mg oral tablet  -- 1 tab(s) by mouth every 12 hours  -- Finish all this medication unless otherwise directed by prescriber.  Take with food or milk. I will STOP taking the medications listed below when I get home from the hospital:    Cogentin  -- 0.5 milligram(s) between cheek and gums once a day (at bedtime)    lithium carbonate extended release  -- 300 milligram(s) by mouth 2 times a day    RisperDAL 2 mg oral tablet  -- 1 tab(s) by mouth 2 times a day    amoxicillin-clavulanate 875 mg-125 mg oral tablet  -- 1 tab(s) by mouth every 12 hours  -- Finish all this medication unless otherwise directed by prescriber.  Take with food or milk.

## 2017-01-24 NOTE — DISCHARGE NOTE ADULT - PATIENT PORTAL LINK FT
“You can access the [facility] Patient Portal, offered by [facility], by registering with the following website: [email or web address]”

## 2017-01-25 VITALS
SYSTOLIC BLOOD PRESSURE: 138 MMHG | OXYGEN SATURATION: 95 % | DIASTOLIC BLOOD PRESSURE: 62 MMHG | RESPIRATION RATE: 20 BRPM | HEART RATE: 62 BPM

## 2017-01-25 LAB
ANION GAP SERPL CALC-SCNC: 5 MMOL/L — SIGNIFICANT CHANGE UP (ref 5–17)
ANION GAP SERPL CALC-SCNC: 5 MMOL/L — SIGNIFICANT CHANGE UP (ref 5–17)
BASOPHILS # BLD AUTO: 0.1 K/UL — SIGNIFICANT CHANGE UP (ref 0–0.2)
BASOPHILS NFR BLD AUTO: 0.8 % — SIGNIFICANT CHANGE UP (ref 0–2)
BUN SERPL-MCNC: 17 MG/DL — SIGNIFICANT CHANGE UP (ref 7–18)
BUN SERPL-MCNC: 17 MG/DL — SIGNIFICANT CHANGE UP (ref 7–18)
CALCIUM SERPL-MCNC: 10 MG/DL — SIGNIFICANT CHANGE UP (ref 8.4–10.5)
CALCIUM SERPL-MCNC: 10.1 MG/DL — SIGNIFICANT CHANGE UP (ref 8.4–10.5)
CALCIUM SERPL-MCNC: 11.5 MG/DL — HIGH (ref 8.4–10.5)
CHLORIDE SERPL-SCNC: 110 MMOL/L — HIGH (ref 96–108)
CHLORIDE SERPL-SCNC: 116 MMOL/L — HIGH (ref 96–108)
CO2 SERPL-SCNC: 30 MMOL/L — SIGNIFICANT CHANGE UP (ref 22–31)
CO2 SERPL-SCNC: 31 MMOL/L — SIGNIFICANT CHANGE UP (ref 22–31)
CREAT SERPL-MCNC: 0.9 MG/DL — SIGNIFICANT CHANGE UP (ref 0.5–1.3)
CREAT SERPL-MCNC: 0.96 MG/DL — SIGNIFICANT CHANGE UP (ref 0.5–1.3)
EOSINOPHIL # BLD AUTO: 0.4 K/UL — SIGNIFICANT CHANGE UP (ref 0–0.5)
EOSINOPHIL NFR BLD AUTO: 3.7 % — SIGNIFICANT CHANGE UP (ref 0–6)
GLUCOSE SERPL-MCNC: 103 MG/DL — HIGH (ref 70–99)
GLUCOSE SERPL-MCNC: 112 MG/DL — HIGH (ref 70–99)
HCT VFR BLD CALC: 30.5 % — LOW (ref 34.5–45)
HGB BLD-MCNC: 9.7 G/DL — LOW (ref 11.5–15.5)
LYMPHOCYTES # BLD AUTO: 2.4 K/UL — SIGNIFICANT CHANGE UP (ref 1–3.3)
LYMPHOCYTES # BLD AUTO: 20.8 % — SIGNIFICANT CHANGE UP (ref 13–44)
MCHC RBC-ENTMCNC: 28.7 PG — SIGNIFICANT CHANGE UP (ref 27–34)
MCHC RBC-ENTMCNC: 31.7 GM/DL — LOW (ref 32–36)
MCV RBC AUTO: 90.6 FL — SIGNIFICANT CHANGE UP (ref 80–100)
MONOCYTES # BLD AUTO: 0.9 K/UL — SIGNIFICANT CHANGE UP (ref 0–0.9)
MONOCYTES NFR BLD AUTO: 7.9 % — SIGNIFICANT CHANGE UP (ref 2–14)
NEUTROPHILS # BLD AUTO: 7.8 K/UL — HIGH (ref 1.8–7.4)
NEUTROPHILS NFR BLD AUTO: 66.7 % — SIGNIFICANT CHANGE UP (ref 43–77)
PLATELET # BLD AUTO: 331 K/UL — SIGNIFICANT CHANGE UP (ref 150–400)
POTASSIUM SERPL-MCNC: 4.4 MMOL/L — SIGNIFICANT CHANGE UP (ref 3.5–5.3)
POTASSIUM SERPL-MCNC: 4.8 MMOL/L — SIGNIFICANT CHANGE UP (ref 3.5–5.3)
POTASSIUM SERPL-SCNC: 4.4 MMOL/L — SIGNIFICANT CHANGE UP (ref 3.5–5.3)
POTASSIUM SERPL-SCNC: 4.8 MMOL/L — SIGNIFICANT CHANGE UP (ref 3.5–5.3)
RBC # BLD: 3.37 M/UL — LOW (ref 3.8–5.2)
RBC # FLD: 15.9 % — HIGH (ref 10.3–14.5)
SODIUM SERPL-SCNC: 145 MMOL/L — SIGNIFICANT CHANGE UP (ref 135–145)
SODIUM SERPL-SCNC: 152 MMOL/L — HIGH (ref 135–145)
WBC # BLD: 11.7 K/UL — HIGH (ref 3.8–10.5)
WBC # FLD AUTO: 11.7 K/UL — HIGH (ref 3.8–10.5)

## 2017-01-25 PROCEDURE — 70450 CT HEAD/BRAIN W/O DYE: CPT

## 2017-01-25 PROCEDURE — 81001 URINALYSIS AUTO W/SCOPE: CPT

## 2017-01-25 PROCEDURE — 94640 AIRWAY INHALATION TREATMENT: CPT

## 2017-01-25 PROCEDURE — 96375 TX/PRO/DX INJ NEW DRUG ADDON: CPT

## 2017-01-25 PROCEDURE — 83690 ASSAY OF LIPASE: CPT

## 2017-01-25 PROCEDURE — 82330 ASSAY OF CALCIUM: CPT

## 2017-01-25 PROCEDURE — 92610 EVALUATE SWALLOWING FUNCTION: CPT

## 2017-01-25 PROCEDURE — 80053 COMPREHEN METABOLIC PANEL: CPT

## 2017-01-25 PROCEDURE — 99285 EMERGENCY DEPT VISIT HI MDM: CPT | Mod: 25

## 2017-01-25 PROCEDURE — 83735 ASSAY OF MAGNESIUM: CPT

## 2017-01-25 PROCEDURE — 84484 ASSAY OF TROPONIN QUANT: CPT

## 2017-01-25 PROCEDURE — 82550 ASSAY OF CK (CPK): CPT

## 2017-01-25 PROCEDURE — 93005 ELECTROCARDIOGRAM TRACING: CPT

## 2017-01-25 PROCEDURE — 84443 ASSAY THYROID STIM HORMONE: CPT

## 2017-01-25 PROCEDURE — 76770 US EXAM ABDO BACK WALL COMP: CPT

## 2017-01-25 PROCEDURE — 85610 PROTHROMBIN TIME: CPT

## 2017-01-25 PROCEDURE — 85730 THROMBOPLASTIN TIME PARTIAL: CPT

## 2017-01-25 PROCEDURE — 97530 THERAPEUTIC ACTIVITIES: CPT

## 2017-01-25 PROCEDURE — 87186 SC STD MICRODIL/AGAR DIL: CPT

## 2017-01-25 PROCEDURE — 82652 VIT D 1 25-DIHYDROXY: CPT

## 2017-01-25 PROCEDURE — 76775 US EXAM ABDO BACK WALL LIM: CPT

## 2017-01-25 PROCEDURE — 93306 TTE W/DOPPLER COMPLETE: CPT

## 2017-01-25 PROCEDURE — 82607 VITAMIN B-12: CPT

## 2017-01-25 PROCEDURE — 83970 ASSAY OF PARATHORMONE: CPT

## 2017-01-25 PROCEDURE — 83605 ASSAY OF LACTIC ACID: CPT

## 2017-01-25 PROCEDURE — 97110 THERAPEUTIC EXERCISES: CPT

## 2017-01-25 PROCEDURE — 83880 ASSAY OF NATRIURETIC PEPTIDE: CPT

## 2017-01-25 PROCEDURE — 80048 BASIC METABOLIC PNL TOTAL CA: CPT

## 2017-01-25 PROCEDURE — 82310 ASSAY OF CALCIUM: CPT

## 2017-01-25 PROCEDURE — 87086 URINE CULTURE/COLONY COUNT: CPT

## 2017-01-25 PROCEDURE — 82009 KETONE BODYS QUAL: CPT

## 2017-01-25 PROCEDURE — 96374 THER/PROPH/DIAG INJ IV PUSH: CPT

## 2017-01-25 PROCEDURE — 83036 HEMOGLOBIN GLYCOSYLATED A1C: CPT

## 2017-01-25 PROCEDURE — 83519 RIA NONANTIBODY: CPT

## 2017-01-25 PROCEDURE — 87040 BLOOD CULTURE FOR BACTERIA: CPT

## 2017-01-25 PROCEDURE — 80178 ASSAY OF LITHIUM: CPT

## 2017-01-25 PROCEDURE — 80061 LIPID PANEL: CPT

## 2017-01-25 PROCEDURE — 85027 COMPLETE CBC AUTOMATED: CPT

## 2017-01-25 PROCEDURE — 84100 ASSAY OF PHOSPHORUS: CPT

## 2017-01-25 PROCEDURE — 71045 X-RAY EXAM CHEST 1 VIEW: CPT

## 2017-01-25 PROCEDURE — 82553 CREATINE MB FRACTION: CPT

## 2017-01-25 RX ORDER — LITHIUM CARBONATE 300 MG/1
300 TABLET, EXTENDED RELEASE ORAL AT BEDTIME
Qty: 0 | Refills: 0 | Status: DISCONTINUED | OUTPATIENT
Start: 2017-01-25 | End: 2017-01-25

## 2017-01-25 RX ORDER — RISPERIDONE 4 MG/1
1 TABLET ORAL
Qty: 60 | Refills: 0 | OUTPATIENT
Start: 2017-01-25 | End: 2017-02-24

## 2017-01-25 RX ORDER — TRIHEXYPHENIDYL HCL 2 MG
1 TABLET ORAL THREE TIMES A DAY
Qty: 0 | Refills: 0 | Status: DISCONTINUED | OUTPATIENT
Start: 2017-01-25 | End: 2017-01-25

## 2017-01-25 RX ORDER — RISPERIDONE 4 MG/1
1 TABLET ORAL
Qty: 60 | Refills: 0 | COMMUNITY
Start: 2017-01-25 | End: 2017-02-24

## 2017-01-25 RX ORDER — TRIHEXYPHENIDYL HCL 2 MG
1 TABLET ORAL
Qty: 0 | Refills: 0 | COMMUNITY
Start: 2017-01-25

## 2017-01-25 RX ORDER — RISPERIDONE 4 MG/1
1 TABLET ORAL
Qty: 0 | Refills: 0 | Status: DISCONTINUED | OUTPATIENT
Start: 2017-01-25 | End: 2017-01-25

## 2017-01-25 RX ORDER — LITHIUM CARBONATE 300 MG/1
1 TABLET, EXTENDED RELEASE ORAL
Qty: 0 | Refills: 0 | COMMUNITY
Start: 2017-01-25

## 2017-01-25 RX ORDER — TRIHEXYPHENIDYL HCL 2 MG
2 TABLET ORAL THREE TIMES A DAY
Qty: 0 | Refills: 0 | Status: DISCONTINUED | OUTPATIENT
Start: 2017-01-25 | End: 2017-01-25

## 2017-01-25 RX ORDER — SODIUM CHLORIDE 9 MG/ML
1000 INJECTION, SOLUTION INTRAVENOUS
Qty: 0 | Refills: 0 | Status: DISCONTINUED | OUTPATIENT
Start: 2017-01-25 | End: 2017-01-25

## 2017-01-25 RX ADMIN — Medication 1 MILLIGRAM(S): at 21:03

## 2017-01-25 RX ADMIN — RISPERIDONE 1 MILLIGRAM(S): 4 TABLET ORAL at 18:30

## 2017-01-25 RX ADMIN — ANASTROZOLE 1 MILLIGRAM(S): 1 TABLET ORAL at 14:39

## 2017-01-25 RX ADMIN — Medication 1 ENEMA: at 14:39

## 2017-01-25 RX ADMIN — Medication 25 MILLIGRAM(S): at 18:31

## 2017-01-25 RX ADMIN — HEPARIN SODIUM 5000 UNIT(S): 5000 INJECTION INTRAVENOUS; SUBCUTANEOUS at 05:20

## 2017-01-25 RX ADMIN — CALCITONIN SALMON 280 INTERNATIONAL UNIT(S): 200 INJECTION, SOLUTION INTRAMUSCULAR at 18:30

## 2017-01-25 RX ADMIN — Medication 25 MILLIGRAM(S): at 05:20

## 2017-01-25 RX ADMIN — SIMVASTATIN 40 MILLIGRAM(S): 20 TABLET, FILM COATED ORAL at 21:03

## 2017-01-25 RX ADMIN — HEPARIN SODIUM 5000 UNIT(S): 5000 INJECTION INTRAVENOUS; SUBCUTANEOUS at 18:31

## 2017-01-25 RX ADMIN — Medication 3 MILLILITER(S): at 15:07

## 2017-01-25 RX ADMIN — SODIUM CHLORIDE 125 MILLILITER(S): 9 INJECTION, SOLUTION INTRAVENOUS at 10:25

## 2017-01-25 RX ADMIN — MEROPENEM 200 MILLIGRAM(S): 1 INJECTION INTRAVENOUS at 14:52

## 2017-01-25 RX ADMIN — CALCITONIN SALMON 280 INTERNATIONAL UNIT(S): 200 INJECTION, SOLUTION INTRAMUSCULAR at 05:20

## 2017-01-25 RX ADMIN — RISPERIDONE 2 MILLIGRAM(S): 4 TABLET ORAL at 05:20

## 2017-01-25 RX ADMIN — PANTOPRAZOLE SODIUM 40 MILLIGRAM(S): 20 TABLET, DELAYED RELEASE ORAL at 05:19

## 2017-01-25 RX ADMIN — MEROPENEM 200 MILLIGRAM(S): 1 INJECTION INTRAVENOUS at 05:19

## 2017-01-25 RX ADMIN — PREGABALIN 100 MICROGRAM(S): 225 CAPSULE ORAL at 14:38

## 2017-01-25 RX ADMIN — LITHIUM CARBONATE 300 MILLIGRAM(S): 300 TABLET, EXTENDED RELEASE ORAL at 20:50

## 2017-01-25 RX ADMIN — LORATADINE 10 MILLIGRAM(S): 10 TABLET ORAL at 14:38

## 2017-01-29 LAB — PTH RELATED PROT SERPL-MCNC: <1.1 PMOL/L — SIGNIFICANT CHANGE UP

## 2017-01-31 DIAGNOSIS — K21.9 GASTRO-ESOPHAGEAL REFLUX DISEASE WITHOUT ESOPHAGITIS: ICD-10-CM

## 2017-01-31 DIAGNOSIS — F31.9 BIPOLAR DISORDER, UNSPECIFIED: ICD-10-CM

## 2017-01-31 DIAGNOSIS — Z87.440 PERSONAL HISTORY OF URINARY (TRACT) INFECTIONS: ICD-10-CM

## 2017-01-31 DIAGNOSIS — Z86.73 PERSONAL HISTORY OF TRANSIENT ISCHEMIC ATTACK (TIA), AND CEREBRAL INFARCTION WITHOUT RESIDUAL DEFICITS: ICD-10-CM

## 2017-01-31 DIAGNOSIS — E86.0 DEHYDRATION: ICD-10-CM

## 2017-01-31 DIAGNOSIS — E83.52 HYPERCALCEMIA: ICD-10-CM

## 2017-01-31 DIAGNOSIS — Z87.891 PERSONAL HISTORY OF NICOTINE DEPENDENCE: ICD-10-CM

## 2017-01-31 DIAGNOSIS — G25.89 OTHER SPECIFIED EXTRAPYRAMIDAL AND MOVEMENT DISORDERS: ICD-10-CM

## 2017-01-31 DIAGNOSIS — T43.591A POISONING BY OTHER ANTIPSYCHOTICS AND NEUROLEPTICS, ACCIDENTAL (UNINTENTIONAL), INITIAL ENCOUNTER: ICD-10-CM

## 2017-01-31 DIAGNOSIS — R19.7 DIARRHEA, UNSPECIFIED: ICD-10-CM

## 2017-01-31 DIAGNOSIS — C50.919 MALIGNANT NEOPLASM OF UNSPECIFIED SITE OF UNSPECIFIED FEMALE BREAST: ICD-10-CM

## 2017-01-31 DIAGNOSIS — D50.9 IRON DEFICIENCY ANEMIA, UNSPECIFIED: ICD-10-CM

## 2017-01-31 DIAGNOSIS — N18.9 CHRONIC KIDNEY DISEASE, UNSPECIFIED: ICD-10-CM

## 2017-01-31 DIAGNOSIS — Z28.21 IMMUNIZATION NOT CARRIED OUT BECAUSE OF PATIENT REFUSAL: ICD-10-CM

## 2017-01-31 DIAGNOSIS — N17.9 ACUTE KIDNEY FAILURE, UNSPECIFIED: ICD-10-CM

## 2017-01-31 DIAGNOSIS — B96.20 UNSPECIFIED ESCHERICHIA COLI [E. COLI] AS THE CAUSE OF DISEASES CLASSIFIED ELSEWHERE: ICD-10-CM

## 2017-01-31 DIAGNOSIS — E44.0 MODERATE PROTEIN-CALORIE MALNUTRITION: ICD-10-CM

## 2017-01-31 DIAGNOSIS — Z88.8 ALLERGY STATUS TO OTHER DRUGS, MEDICAMENTS AND BIOLOGICAL SUBSTANCES STATUS: ICD-10-CM

## 2017-01-31 DIAGNOSIS — E78.5 HYPERLIPIDEMIA, UNSPECIFIED: ICD-10-CM

## 2017-01-31 DIAGNOSIS — Z16.24 RESISTANCE TO MULTIPLE ANTIBIOTICS: ICD-10-CM

## 2017-01-31 DIAGNOSIS — G20 PARKINSON'S DISEASE: ICD-10-CM

## 2017-01-31 DIAGNOSIS — J44.9 CHRONIC OBSTRUCTIVE PULMONARY DISEASE, UNSPECIFIED: ICD-10-CM

## 2017-01-31 DIAGNOSIS — Z88.6 ALLERGY STATUS TO ANALGESIC AGENT: ICD-10-CM

## 2017-01-31 DIAGNOSIS — T43.595A ADVERSE EFFECT OF OTHER ANTIPSYCHOTICS AND NEUROLEPTICS, INITIAL ENCOUNTER: ICD-10-CM
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
## 2017-01-31 DIAGNOSIS — N39.0 URINARY TRACT INFECTION, SITE NOT SPECIFIED: ICD-10-CM

## 2017-03-02 ENCOUNTER — INPATIENT (INPATIENT)
Facility: HOSPITAL | Age: 77
LOS: 6 days | Discharge: NURSING, REHAB OR LONG-TERM CARE | DRG: 92 | End: 2017-03-09
Attending: INTERNAL MEDICINE | Admitting: INTERNAL MEDICINE
Payer: MEDICARE

## 2017-03-02 VITALS
RESPIRATION RATE: 18 BRPM | WEIGHT: 160.06 LBS | SYSTOLIC BLOOD PRESSURE: 115 MMHG | DIASTOLIC BLOOD PRESSURE: 73 MMHG | OXYGEN SATURATION: 95 % | HEIGHT: 63 IN | HEART RATE: 95 BPM

## 2017-03-02 DIAGNOSIS — G20 PARKINSON'S DISEASE: ICD-10-CM
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
## 2017-03-02 DIAGNOSIS — F31.9 BIPOLAR DISORDER, UNSPECIFIED: ICD-10-CM
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
## 2017-03-02 DIAGNOSIS — J44.9 CHRONIC OBSTRUCTIVE PULMONARY DISEASE, UNSPECIFIED: ICD-10-CM

## 2017-03-02 DIAGNOSIS — R26.81 UNSTEADINESS ON FEET: ICD-10-CM

## 2017-03-02 PROBLEM — E78.5 HYPERLIPIDEMIA, UNSPECIFIED: Chronic | Status: ACTIVE | Noted: 2017-01-16

## 2017-03-02 LAB
ANION GAP SERPL CALC-SCNC: 6 MMOL/L — SIGNIFICANT CHANGE UP (ref 5–17)
BASOPHILS # BLD AUTO: 0.1 K/UL — SIGNIFICANT CHANGE UP (ref 0–0.2)
BASOPHILS NFR BLD AUTO: 0.8 % — SIGNIFICANT CHANGE UP (ref 0–2)
BUN SERPL-MCNC: 19 MG/DL — HIGH (ref 7–18)
CALCIUM SERPL-MCNC: 10.7 MG/DL — HIGH (ref 8.4–10.5)
CHLORIDE SERPL-SCNC: 111 MMOL/L — HIGH (ref 96–108)
CO2 SERPL-SCNC: 28 MMOL/L — SIGNIFICANT CHANGE UP (ref 22–31)
CREAT SERPL-MCNC: 1.31 MG/DL — HIGH (ref 0.5–1.3)
EOSINOPHIL # BLD AUTO: 0.9 K/UL — HIGH (ref 0–0.5)
EOSINOPHIL NFR BLD AUTO: 5.4 % — SIGNIFICANT CHANGE UP (ref 0–6)
GLUCOSE SERPL-MCNC: 88 MG/DL — SIGNIFICANT CHANGE UP (ref 70–99)
HCT VFR BLD CALC: 34.5 % — SIGNIFICANT CHANGE UP (ref 34.5–45)
HGB BLD-MCNC: 10.9 G/DL — LOW (ref 11.5–15.5)
LYMPHOCYTES # BLD AUTO: 15 % — SIGNIFICANT CHANGE UP (ref 13–44)
LYMPHOCYTES # BLD AUTO: 2.5 K/UL — SIGNIFICANT CHANGE UP (ref 1–3.3)
MCHC RBC-ENTMCNC: 28.1 PG — SIGNIFICANT CHANGE UP (ref 27–34)
MCHC RBC-ENTMCNC: 31.5 GM/DL — LOW (ref 32–36)
MCV RBC AUTO: 89 FL — SIGNIFICANT CHANGE UP (ref 80–100)
MONOCYTES # BLD AUTO: 0.5 K/UL — SIGNIFICANT CHANGE UP (ref 0–0.9)
MONOCYTES NFR BLD AUTO: 3.1 % — SIGNIFICANT CHANGE UP (ref 2–14)
NEUTROPHILS # BLD AUTO: 12.8 K/UL — HIGH (ref 1.8–7.4)
NEUTROPHILS NFR BLD AUTO: 75.7 % — SIGNIFICANT CHANGE UP (ref 43–77)
PLATELET # BLD AUTO: 338 K/UL — SIGNIFICANT CHANGE UP (ref 150–400)
POTASSIUM SERPL-MCNC: 4.3 MMOL/L — SIGNIFICANT CHANGE UP (ref 3.5–5.3)
POTASSIUM SERPL-SCNC: 4.3 MMOL/L — SIGNIFICANT CHANGE UP (ref 3.5–5.3)
RBC # BLD: 3.88 M/UL — SIGNIFICANT CHANGE UP (ref 3.8–5.2)
RBC # FLD: 16.2 % — HIGH (ref 10.3–14.5)
SODIUM SERPL-SCNC: 145 MMOL/L — SIGNIFICANT CHANGE UP (ref 135–145)
WBC # BLD: 16.9 K/UL — HIGH (ref 3.8–10.5)
WBC # FLD AUTO: 16.9 K/UL — HIGH (ref 3.8–10.5)

## 2017-03-02 PROCEDURE — 71010: CPT | Mod: 26

## 2017-03-02 PROCEDURE — 99285 EMERGENCY DEPT VISIT HI MDM: CPT

## 2017-03-02 RX ORDER — BETHANECHOL CHLORIDE 25 MG
25 TABLET ORAL
Qty: 0 | Refills: 0 | Status: DISCONTINUED | OUTPATIENT
Start: 2017-03-02 | End: 2017-03-09

## 2017-03-02 RX ORDER — LACTOBACILLUS ACIDOPHILUS 100MM CELL
1 CAPSULE ORAL
Qty: 0 | Refills: 0 | COMMUNITY

## 2017-03-02 RX ORDER — TRIHEXYPHENIDYL HCL 2 MG
1 TABLET ORAL THREE TIMES A DAY
Qty: 0 | Refills: 0 | Status: DISCONTINUED | OUTPATIENT
Start: 2017-03-02 | End: 2017-03-02

## 2017-03-02 RX ORDER — LORATADINE 10 MG/1
10 TABLET ORAL DAILY
Qty: 0 | Refills: 0 | Status: DISCONTINUED | OUTPATIENT
Start: 2017-03-02 | End: 2017-03-09

## 2017-03-02 RX ORDER — LITHIUM CARBONATE 300 MG/1
300 TABLET, EXTENDED RELEASE ORAL
Qty: 0 | Refills: 0 | Status: DISCONTINUED | OUTPATIENT
Start: 2017-03-02 | End: 2017-03-09

## 2017-03-02 RX ORDER — ANASTROZOLE 1 MG/1
1 TABLET ORAL DAILY
Qty: 0 | Refills: 0 | Status: DISCONTINUED | OUTPATIENT
Start: 2017-03-02 | End: 2017-03-09

## 2017-03-02 RX ORDER — MIDAZOLAM HYDROCHLORIDE 1 MG/ML
2 INJECTION, SOLUTION INTRAMUSCULAR; INTRAVENOUS ONCE
Qty: 0 | Refills: 0 | Status: DISCONTINUED | OUTPATIENT
Start: 2017-03-02 | End: 2017-03-02

## 2017-03-02 RX ORDER — HEPARIN SODIUM 5000 [USP'U]/ML
5000 INJECTION INTRAVENOUS; SUBCUTANEOUS EVERY 8 HOURS
Qty: 0 | Refills: 0 | Status: DISCONTINUED | OUTPATIENT
Start: 2017-03-02 | End: 2017-03-09

## 2017-03-02 RX ORDER — SODIUM CHLORIDE 9 MG/ML
1000 INJECTION INTRAMUSCULAR; INTRAVENOUS; SUBCUTANEOUS
Qty: 0 | Refills: 0 | Status: DISCONTINUED | OUTPATIENT
Start: 2017-03-02 | End: 2017-03-03

## 2017-03-02 RX ORDER — ACETYLCYSTEINE 200 MG/ML
3 VIAL (ML) MISCELLANEOUS THREE TIMES A DAY
Qty: 0 | Refills: 0 | Status: DISCONTINUED | OUTPATIENT
Start: 2017-03-02 | End: 2017-03-06

## 2017-03-02 RX ORDER — SIMVASTATIN 20 MG/1
40 TABLET, FILM COATED ORAL AT BEDTIME
Qty: 0 | Refills: 0 | Status: DISCONTINUED | OUTPATIENT
Start: 2017-03-02 | End: 2017-03-09

## 2017-03-02 RX ORDER — RISPERIDONE 4 MG/1
1 TABLET ORAL DAILY
Qty: 0 | Refills: 0 | Status: DISCONTINUED | OUTPATIENT
Start: 2017-03-02 | End: 2017-03-09

## 2017-03-02 RX ORDER — PREGABALIN 225 MG/1
100 CAPSULE ORAL DAILY
Qty: 0 | Refills: 0 | Status: DISCONTINUED | OUTPATIENT
Start: 2017-03-02 | End: 2017-03-09

## 2017-03-02 RX ORDER — PANTOPRAZOLE SODIUM 20 MG/1
40 TABLET, DELAYED RELEASE ORAL
Qty: 0 | Refills: 0 | Status: DISCONTINUED | OUTPATIENT
Start: 2017-03-02 | End: 2017-03-09

## 2017-03-02 RX ORDER — TRIHEXYPHENIDYL HCL 2 MG
1 TABLET ORAL THREE TIMES A DAY
Qty: 0 | Refills: 0 | Status: DISCONTINUED | OUTPATIENT
Start: 2017-03-02 | End: 2017-03-09

## 2017-03-02 RX ADMIN — SODIUM CHLORIDE 60 MILLILITER(S): 9 INJECTION INTRAMUSCULAR; INTRAVENOUS; SUBCUTANEOUS at 16:12

## 2017-03-02 RX ADMIN — LORATADINE 10 MILLIGRAM(S): 10 TABLET ORAL at 22:58

## 2017-03-02 RX ADMIN — Medication 25 MILLIGRAM(S): at 19:19

## 2017-03-02 RX ADMIN — SIMVASTATIN 40 MILLIGRAM(S): 20 TABLET, FILM COATED ORAL at 22:58

## 2017-03-02 RX ADMIN — Medication 3 MILLILITER(S): at 21:01

## 2017-03-02 RX ADMIN — Medication 1 MILLIGRAM(S): at 22:58

## 2017-03-02 RX ADMIN — LITHIUM CARBONATE 300 MILLIGRAM(S): 300 TABLET, EXTENDED RELEASE ORAL at 19:19

## 2017-03-02 RX ADMIN — HEPARIN SODIUM 5000 UNIT(S): 5000 INJECTION INTRAVENOUS; SUBCUTANEOUS at 22:58

## 2017-03-02 RX ADMIN — PREGABALIN 100 MICROGRAM(S): 225 CAPSULE ORAL at 22:58

## 2017-03-02 NOTE — H&P ADULT. - RS GEN PE MLT RESP DETAILS PC
no rales/normal/no chest wall tenderness/no wheezes/breath sounds equal/no rhonchi/clear to auscultation bilaterally

## 2017-03-02 NOTE — ED PROVIDER NOTE - OBJECTIVE STATEMENT
78 y/o F, poor historian,  w/ PMHx of bipolar disorder, HLD, COPD, and Parkinson's BIB NH p/w evaluation for fall onset today. In ED, pt repeatedly stating she wants to go back home and unable to provide details of fall. Pt denies pain or any other complaint. Allergies: codeine, Thorazine.

## 2017-03-02 NOTE — ED PROVIDER NOTE - MEDICAL DECISION MAKING DETAILS
pt sent from nursing home for multiple falls.  Pt denying complaitns, however history of bipolar.  Unremarkable exam, will check x-ray, labs, and admit.

## 2017-03-02 NOTE — ED PROVIDER NOTE - PROGRESS NOTE DETAILS
pt with repeated falls at living facility.  Will need medical admission as facility is unable to care for patient.

## 2017-03-02 NOTE — H&P ADULT. - NEGATIVE CARDIOVASCULAR SYMPTOMS
no paroxysmal nocturnal dyspnea/no peripheral edema/no palpitations/no chest pain/no dyspnea on exertion

## 2017-03-02 NOTE — H&P ADULT. - PROBLEM SELECTOR PLAN 1
Pt has 2 episodes of Fall today likely Mechanical or due to delayed motor responses.  f/u UA  Pt refused orthostatics  Xray pelvis refused by pt.  f/u CT head  f/u PT eval Pt has 2 episodes of Fall today likely Mechanical or due to delayed motor responses or UTI.  f/u UA, informed RN.  Pt refused orthostatics  Xray pelvis refused by pt.  f/u CT head  f/u PT eval

## 2017-03-02 NOTE — H&P ADULT. - PROBLEM SELECTOR PLAN 2
Pt has TAMARA likely sec to prerenal as pt has dehydration in past also.  NS @ 60 cc/hr  f/u BMP Pt has TAMARA likely sec to prerenal as pt has dehydration in past also.  NS @ 60 cc/hr  f/u BMP  f/u urine lytes

## 2017-03-02 NOTE — ED ADULT NURSE NOTE - OBJECTIVE STATEMENT
Pt with PMH COPD Parkinson D/O Bipolar D/O BIBA from N/H for post fall evaluation Pt awake verbally responsive

## 2017-03-02 NOTE — H&P ADULT. - ASSESSMENT
78 y/o F , walks with a walker at baseline, from [facility] PMH of COPD, anemia, Parkinson's disease, HLD, Bipolar Disorder (on lithium), Breast Cancer, CKD and recent discharge from [facility] (Jan 2017) after treatment for AMS sec to UTI was sent to ED s/p Fall x 2 today at NH. Pt is a poor historian, repeatedly stating she wants to go back home and unable to provide details of fall. Pt denies pain, CP, SOB, Abd pain, N/V, Fver, dizziness, diarrhea, dysuria or any other complaint currently. Patient is AAOX2. After discharge, patient was in rehab . Pt doesn't drink enough water and is usually dehydrated. Denies any pain, URI, hematuria, rash/ulcer, weight change or other complaints.   Social: patient has been living in facility for years and sister Brissa Lozano at [phone] makes decision for her. She has a daughter in [place] (Eboni Dougherty). 78 y/o F , walks with a walker at baseline, from [facility] PMH of COPD, anemia, Parkinson's disease, HLD, Bipolar Disorder (on lithium), Breast Cancer, CKD and recent discharge from [facility] (Jan 2017) after treatment for AMS sec to MDR Ecoli  UTI was treated with Invanz last admission  was sent to ED s/p Fall x 2 today at NH. Pt is a poor historian, repeatedly stating she wants to go back home and unable to provide details of fall. Pt denies pain, CP, SOB, Abd pain, N/V, Fver, dizziness, diarrhea, dysuria or any other complaint currently. Patient is AAOX2. After discharge, patient was in rehab . Pt doesn't drink enough water and is usually dehydrated. Denies any pain, URI, hematuria, rash/ulcer, weight change or other complaints.   Social: patient has been living in facility for years and sister Brissa Lzoano at [phone] makes decision for her. She has a daughter in [place] (Eboni Dougherty).

## 2017-03-02 NOTE — H&P ADULT. - NEGATIVE NEUROLOGICAL SYMPTOMS
no loss of consciousness/no weakness/no focal seizures/no tremors/no vertigo/no loss of sensation/no facial palsy/no syncope/no transient paralysis

## 2017-03-02 NOTE — H&P ADULT. - HISTORY OF PRESENT ILLNESS
76 y/o F , walks with a walker at baseline, from NH PMH of COPD, anemia, Parkinson's disease, HLD, Bipolar Disorder (on lithium), Breast Cancer, CKD and recent discharge from [facility] (Jan 2017) after treatment for AMS sec to UTI was sent to ED s/p Fall x 2 today at NH.  Patient is lethargic and oriented only to name. History obtained by sister. After discharge, patient was in rehab where she became progressively weak. Pt doesn't drink enough water and is usually dehydrated. Denies any pain, URI, hematuria, rash/ulcer, weight change or other complaints.   Social: patient has been living in facility for years and sister Brissa Lozano at [phone] makes decision for her. She has a daughter in [place] (Eboni Dougherty). Patient is a former smoker.   Family: unknown   Goals of care: DNR/DNI as per CHRISTOPHER 78 y/o F , walks with a walker at baseline, [facility], anemia, Parkinson's disease, HLD, Bipolar Disorder (on lithium), Breast Cancer, CKD and recent discharge from [facility] (Jan 2017) after treatment for AMS sec to UTI was sent to ED s/p Fall x 2 today at NH. Pt is a poor historian, repeatedly stating she wants to go back home and unable to provide details of fall. Pt denies pain or any other complaint. Patient is lethargic and oriented only to name. History obtained by sister. After discharge, patient was in rehab . Pt doesn't drink enough water and is usually dehydrated. Denies any pain, URI, hematuria, rash/ulcer, weight change or other complaints.   Social: patient has been living in facility for years and sister Brissa Lozano at [phone] makes decision for her. She has a daughter in [place] (Eboni Dougherty). Patient is a former smoker.   Family: unknown   Goals of care: DNR/DNI as per CHRISTOPHER 78 y/o F , walks with a walker at baseline[facility], [facility]'s disease, HLD, Bipolar Disorder (on lithium), Breast Cancer, CKD [facility] treatment for AMS sec to UTI was sent to ED s/p Fall x 2 today at NH. Pt is a poor historian, repeatedly stating she wants to go back home and unable to provide details of fall. Pt denies pain or any other complaint. Patient is . History obtained by sister. After discharge, patient was in rehab . Pt doesn't drink enough water and is usually dehydrated. Denies any pain, URI, hematuria, rash/ulcer, weight change or other complaints.   Social: patient has been living in facility for years and sister Brissa Lozano at [phone] makes decision for her. She has a daughter in [place] (Eboni Dougherty). Patient is a former smoker.   Family: unknown   Goals of care: DNR/DNI as per CHRISTOPHER 76 y/o F , walks with a walker at baseline, from [facility] PMH of COPD, anemia, Parkinson's disease, HLD, Bipolar Disorder (on lithium), Breast Cancer, CKD and recent discharge from [facility] (Jan 2017) after treatment for AMS sec to UTI was sent to ED s/p Fall x 2 today at NH. Pt is a poor historian, repeatedly stating she wants to go back home and unable to provide details of fall. Pt denies pain, CP, SOB, Abd pain, N/V, Fver, dizziness, diarrhea, dysuria or any other complaint currently. Patient is AAOX2. After discharge, patient was in rehab . Pt doesn't drink enough water and is usually dehydrated. Denies any pain, URI, hematuria, rash/ulcer, weight change or other complaints.   Social: patient has been living in facility for years and sister Brissa Lozano at [phone] makes decision for her. She has a daughter in [place] (Eboni Dougherty). 76 y/o F , walks with a walker at baseline, from [facility] PMH of COPD, anemia, Parkinson's disease, HLD, Bipolar Disorder (on lithium), Breast Cancer, CKD and recent discharge from [facility] (Jan 2017) after treatment for AMS sec to MDR Ecoli  UTI was treated with Invanz last admission  was sent to ED s/p Fall x 2 today at NH. Pt is a poor historian, repeatedly stating she wants to go back home and unable to provide details of fall. Pt denies pain, CP, SOB, Abd pain, N/V, Fver, dizziness, diarrhea, dysuria or any other complaint currently. Patient is AAOX2. After discharge, patient was in rehab . Pt doesn't drink enough water and is usually dehydrated. Denies any pain, URI, hematuria, rash/ulcer, weight change or other complaints.   Social: patient has been living in facility for years and sister Brissa Lozano at [phone] makes decision for her. She has a daughter in [place] (Eboni Dougherty).

## 2017-03-03 LAB
APPEARANCE UR: CLEAR — SIGNIFICANT CHANGE UP
BILIRUB UR-MCNC: NEGATIVE — SIGNIFICANT CHANGE UP
CHLORIDE UR-SCNC: 55 MMOL/L — SIGNIFICANT CHANGE UP (ref 55–125)
COLOR SPEC: YELLOW — SIGNIFICANT CHANGE UP
CREAT ?TM UR-MCNC: 27 MG/DL — SIGNIFICANT CHANGE UP
DIFF PNL FLD: NEGATIVE — SIGNIFICANT CHANGE UP
GLUCOSE UR QL: NEGATIVE — SIGNIFICANT CHANGE UP
KETONES UR-MCNC: NEGATIVE — SIGNIFICANT CHANGE UP
LEUKOCYTE ESTERASE UR-ACNC: ABNORMAL
NITRITE UR-MCNC: POSITIVE
OSMOLALITY UR: 206 MOS/KG — SIGNIFICANT CHANGE UP (ref 50–1200)
PH UR: 6 — SIGNIFICANT CHANGE UP (ref 4.8–8)
POTASSIUM UR-SCNC: 10 MMOL/L — LOW (ref 25–125)
PROT ?TM UR-MCNC: <5 MG/DL — SIGNIFICANT CHANGE UP (ref 0–12)
PROT UR-MCNC: NEGATIVE — SIGNIFICANT CHANGE UP
SODIUM UR-SCNC: 56 MMOL/L — SIGNIFICANT CHANGE UP (ref 40–220)
SP GR SPEC: 1.01 — SIGNIFICANT CHANGE UP (ref 1.01–1.02)
UROBILINOGEN FLD QL: NEGATIVE — SIGNIFICANT CHANGE UP

## 2017-03-03 RX ADMIN — LITHIUM CARBONATE 300 MILLIGRAM(S): 300 TABLET, EXTENDED RELEASE ORAL at 18:18

## 2017-03-03 RX ADMIN — Medication 25 MILLIGRAM(S): at 18:18

## 2017-03-03 RX ADMIN — Medication 1 MILLIGRAM(S): at 05:13

## 2017-03-03 RX ADMIN — Medication 3 MILLILITER(S): at 03:34

## 2017-03-03 RX ADMIN — HEPARIN SODIUM 5000 UNIT(S): 5000 INJECTION INTRAVENOUS; SUBCUTANEOUS at 05:13

## 2017-03-03 RX ADMIN — PREGABALIN 100 MICROGRAM(S): 225 CAPSULE ORAL at 12:15

## 2017-03-03 RX ADMIN — SIMVASTATIN 40 MILLIGRAM(S): 20 TABLET, FILM COATED ORAL at 22:15

## 2017-03-03 RX ADMIN — Medication 25 MILLIGRAM(S): at 05:13

## 2017-03-03 RX ADMIN — HEPARIN SODIUM 5000 UNIT(S): 5000 INJECTION INTRAVENOUS; SUBCUTANEOUS at 18:18

## 2017-03-03 RX ADMIN — PANTOPRAZOLE SODIUM 40 MILLIGRAM(S): 20 TABLET, DELAYED RELEASE ORAL at 05:13

## 2017-03-03 RX ADMIN — LORATADINE 10 MILLIGRAM(S): 10 TABLET ORAL at 18:17

## 2017-03-03 RX ADMIN — ANASTROZOLE 1 MILLIGRAM(S): 1 TABLET ORAL at 12:15

## 2017-03-03 RX ADMIN — Medication 1 MILLIGRAM(S): at 18:17

## 2017-03-03 RX ADMIN — Medication 1 MILLIGRAM(S): at 22:15

## 2017-03-03 RX ADMIN — LITHIUM CARBONATE 300 MILLIGRAM(S): 300 TABLET, EXTENDED RELEASE ORAL at 05:13

## 2017-03-03 NOTE — PHYSICAL THERAPY INITIAL EVALUATION ADULT - CRITERIA FOR SKILLED THERAPEUTIC INTERVENTIONS
risk reduction/prevention/rehab potential/impairments found/functional limitations in following categories

## 2017-03-03 NOTE — PHYSICAL THERAPY INITIAL EVALUATION ADULT - LIVES WITH, PROFILE
Patient arrived from [facility]. Patient stated she has lived there for about 1 year.

## 2017-03-03 NOTE — PHYSICAL THERAPY INITIAL EVALUATION ADULT - DIAGNOSIS, PT EVAL
Patient slow to initiate movement, anxiety during sit to stand transfer and gait training, poor balance, decreased weight bearing through LE.

## 2017-03-04 LAB
ANION GAP SERPL CALC-SCNC: 6 MMOL/L — SIGNIFICANT CHANGE UP (ref 5–17)
BASOPHILS # BLD AUTO: 0.2 K/UL — SIGNIFICANT CHANGE UP (ref 0–0.2)
BASOPHILS NFR BLD AUTO: 1.2 % — SIGNIFICANT CHANGE UP (ref 0–2)
BUN SERPL-MCNC: 16 MG/DL — SIGNIFICANT CHANGE UP (ref 7–18)
CALCIUM SERPL-MCNC: 10.7 MG/DL — HIGH (ref 8.4–10.5)
CHLORIDE SERPL-SCNC: 115 MMOL/L — HIGH (ref 96–108)
CO2 SERPL-SCNC: 28 MMOL/L — SIGNIFICANT CHANGE UP (ref 22–31)
CREAT SERPL-MCNC: 1.11 MG/DL — SIGNIFICANT CHANGE UP (ref 0.5–1.3)
EOSINOPHIL # BLD AUTO: 1.3 K/UL — HIGH (ref 0–0.5)
EOSINOPHIL NFR BLD AUTO: 9.7 % — HIGH (ref 0–6)
GLUCOSE SERPL-MCNC: 105 MG/DL — HIGH (ref 70–99)
HCT VFR BLD CALC: 32 % — LOW (ref 34.5–45)
HGB BLD-MCNC: 10.1 G/DL — LOW (ref 11.5–15.5)
LYMPHOCYTES # BLD AUTO: 23.9 % — SIGNIFICANT CHANGE UP (ref 13–44)
LYMPHOCYTES # BLD AUTO: 3.2 K/UL — SIGNIFICANT CHANGE UP (ref 1–3.3)
MCHC RBC-ENTMCNC: 28.1 PG — SIGNIFICANT CHANGE UP (ref 27–34)
MCHC RBC-ENTMCNC: 31.5 GM/DL — LOW (ref 32–36)
MCV RBC AUTO: 89.2 FL — SIGNIFICANT CHANGE UP (ref 80–100)
MONOCYTES # BLD AUTO: 0.7 K/UL — SIGNIFICANT CHANGE UP (ref 0–0.9)
MONOCYTES NFR BLD AUTO: 5.5 % — SIGNIFICANT CHANGE UP (ref 2–14)
NEUTROPHILS # BLD AUTO: 7.9 K/UL — HIGH (ref 1.8–7.4)
NEUTROPHILS NFR BLD AUTO: 59.7 % — SIGNIFICANT CHANGE UP (ref 43–77)
PLATELET # BLD AUTO: 306 K/UL — SIGNIFICANT CHANGE UP (ref 150–400)
POTASSIUM SERPL-MCNC: 3.9 MMOL/L — SIGNIFICANT CHANGE UP (ref 3.5–5.3)
POTASSIUM SERPL-SCNC: 3.9 MMOL/L — SIGNIFICANT CHANGE UP (ref 3.5–5.3)
RBC # BLD: 3.59 M/UL — LOW (ref 3.8–5.2)
RBC # FLD: 16.3 % — HIGH (ref 10.3–14.5)
SODIUM SERPL-SCNC: 149 MMOL/L — HIGH (ref 135–145)
WBC # BLD: 13.3 K/UL — HIGH (ref 3.8–10.5)
WBC # FLD AUTO: 13.3 K/UL — HIGH (ref 3.8–10.5)

## 2017-03-04 RX ADMIN — Medication 1 MILLIGRAM(S): at 05:38

## 2017-03-04 RX ADMIN — PREGABALIN 100 MICROGRAM(S): 225 CAPSULE ORAL at 13:25

## 2017-03-04 RX ADMIN — ANASTROZOLE 1 MILLIGRAM(S): 1 TABLET ORAL at 13:25

## 2017-03-04 RX ADMIN — HEPARIN SODIUM 5000 UNIT(S): 5000 INJECTION INTRAVENOUS; SUBCUTANEOUS at 05:39

## 2017-03-04 RX ADMIN — SIMVASTATIN 40 MILLIGRAM(S): 20 TABLET, FILM COATED ORAL at 21:21

## 2017-03-04 RX ADMIN — LORATADINE 10 MILLIGRAM(S): 10 TABLET ORAL at 13:25

## 2017-03-04 RX ADMIN — Medication 1 MILLIGRAM(S): at 21:21

## 2017-03-04 RX ADMIN — LITHIUM CARBONATE 300 MILLIGRAM(S): 300 TABLET, EXTENDED RELEASE ORAL at 05:38

## 2017-03-04 RX ADMIN — Medication 1 MILLIGRAM(S): at 14:53

## 2017-03-04 RX ADMIN — Medication 25 MILLIGRAM(S): at 05:38

## 2017-03-04 RX ADMIN — Medication 25 MILLIGRAM(S): at 18:40

## 2017-03-04 RX ADMIN — LITHIUM CARBONATE 300 MILLIGRAM(S): 300 TABLET, EXTENDED RELEASE ORAL at 18:40

## 2017-03-04 RX ADMIN — HEPARIN SODIUM 5000 UNIT(S): 5000 INJECTION INTRAVENOUS; SUBCUTANEOUS at 21:21

## 2017-03-04 RX ADMIN — PANTOPRAZOLE SODIUM 40 MILLIGRAM(S): 20 TABLET, DELAYED RELEASE ORAL at 05:38

## 2017-03-04 RX ADMIN — HEPARIN SODIUM 5000 UNIT(S): 5000 INJECTION INTRAVENOUS; SUBCUTANEOUS at 14:54

## 2017-03-05 RX ORDER — SODIUM CHLORIDE 9 MG/ML
500 INJECTION INTRAMUSCULAR; INTRAVENOUS; SUBCUTANEOUS ONCE
Qty: 0 | Refills: 0 | Status: COMPLETED | OUTPATIENT
Start: 2017-03-05 | End: 2017-03-05

## 2017-03-05 RX ADMIN — SIMVASTATIN 40 MILLIGRAM(S): 20 TABLET, FILM COATED ORAL at 21:58

## 2017-03-05 RX ADMIN — LORATADINE 10 MILLIGRAM(S): 10 TABLET ORAL at 13:03

## 2017-03-05 RX ADMIN — PANTOPRAZOLE SODIUM 40 MILLIGRAM(S): 20 TABLET, DELAYED RELEASE ORAL at 05:28

## 2017-03-05 RX ADMIN — SODIUM CHLORIDE 2000 MILLILITER(S): 9 INJECTION INTRAMUSCULAR; INTRAVENOUS; SUBCUTANEOUS at 05:28

## 2017-03-05 RX ADMIN — HEPARIN SODIUM 5000 UNIT(S): 5000 INJECTION INTRAVENOUS; SUBCUTANEOUS at 21:57

## 2017-03-05 RX ADMIN — HEPARIN SODIUM 5000 UNIT(S): 5000 INJECTION INTRAVENOUS; SUBCUTANEOUS at 05:27

## 2017-03-05 RX ADMIN — ANASTROZOLE 1 MILLIGRAM(S): 1 TABLET ORAL at 13:03

## 2017-03-05 RX ADMIN — Medication 1 MILLIGRAM(S): at 05:31

## 2017-03-05 RX ADMIN — Medication 25 MILLIGRAM(S): at 05:27

## 2017-03-05 RX ADMIN — LITHIUM CARBONATE 300 MILLIGRAM(S): 300 TABLET, EXTENDED RELEASE ORAL at 05:28

## 2017-03-05 RX ADMIN — LITHIUM CARBONATE 300 MILLIGRAM(S): 300 TABLET, EXTENDED RELEASE ORAL at 18:33

## 2017-03-05 RX ADMIN — Medication 1 MILLIGRAM(S): at 21:58

## 2017-03-05 RX ADMIN — Medication 1 MILLIGRAM(S): at 13:03

## 2017-03-05 RX ADMIN — Medication 25 MILLIGRAM(S): at 18:33

## 2017-03-05 RX ADMIN — PREGABALIN 100 MICROGRAM(S): 225 CAPSULE ORAL at 13:03

## 2017-03-06 LAB
ANION GAP SERPL CALC-SCNC: 6 MMOL/L — SIGNIFICANT CHANGE UP (ref 5–17)
BASOPHILS # BLD AUTO: 0.1 K/UL — SIGNIFICANT CHANGE UP (ref 0–0.2)
BASOPHILS NFR BLD AUTO: 1.1 % — SIGNIFICANT CHANGE UP (ref 0–2)
BUN SERPL-MCNC: 19 MG/DL — HIGH (ref 7–18)
CALCIUM SERPL-MCNC: 10.3 MG/DL — SIGNIFICANT CHANGE UP (ref 8.4–10.5)
CHLORIDE SERPL-SCNC: 110 MMOL/L — HIGH (ref 96–108)
CO2 SERPL-SCNC: 28 MMOL/L — SIGNIFICANT CHANGE UP (ref 22–31)
CREAT SERPL-MCNC: 1.4 MG/DL — HIGH (ref 0.5–1.3)
EOSINOPHIL # BLD AUTO: 0.8 K/UL — HIGH (ref 0–0.5)
EOSINOPHIL NFR BLD AUTO: 6.5 % — HIGH (ref 0–6)
GLUCOSE SERPL-MCNC: 154 MG/DL — HIGH (ref 70–99)
HCT VFR BLD CALC: 35.3 % — SIGNIFICANT CHANGE UP (ref 34.5–45)
HGB BLD-MCNC: 11.1 G/DL — LOW (ref 11.5–15.5)
LYMPHOCYTES # BLD AUTO: 15.3 % — SIGNIFICANT CHANGE UP (ref 13–44)
LYMPHOCYTES # BLD AUTO: 2 K/UL — SIGNIFICANT CHANGE UP (ref 1–3.3)
MCHC RBC-ENTMCNC: 27.8 PG — SIGNIFICANT CHANGE UP (ref 27–34)
MCHC RBC-ENTMCNC: 31.4 GM/DL — LOW (ref 32–36)
MCV RBC AUTO: 88.6 FL — SIGNIFICANT CHANGE UP (ref 80–100)
MONOCYTES # BLD AUTO: 0.3 K/UL — SIGNIFICANT CHANGE UP (ref 0–0.9)
MONOCYTES NFR BLD AUTO: 2.5 % — SIGNIFICANT CHANGE UP (ref 2–14)
NEUTROPHILS # BLD AUTO: 9.7 K/UL — HIGH (ref 1.8–7.4)
NEUTROPHILS NFR BLD AUTO: 74.7 % — SIGNIFICANT CHANGE UP (ref 43–77)
PLATELET # BLD AUTO: 147 K/UL — LOW (ref 150–400)
POTASSIUM SERPL-MCNC: 4.8 MMOL/L — SIGNIFICANT CHANGE UP (ref 3.5–5.3)
POTASSIUM SERPL-SCNC: 4.8 MMOL/L — SIGNIFICANT CHANGE UP (ref 3.5–5.3)
RBC # BLD: 3.98 M/UL — SIGNIFICANT CHANGE UP (ref 3.8–5.2)
RBC # FLD: 15.6 % — HIGH (ref 10.3–14.5)
SODIUM SERPL-SCNC: 144 MMOL/L — SIGNIFICANT CHANGE UP (ref 135–145)
WBC # BLD: 13 K/UL — HIGH (ref 3.8–10.5)
WBC # FLD AUTO: 13 K/UL — HIGH (ref 3.8–10.5)

## 2017-03-06 RX ORDER — LITHIUM CARBONATE 300 MG/1
300 TABLET, EXTENDED RELEASE ORAL
Qty: 0 | Refills: 0 | COMMUNITY

## 2017-03-06 RX ORDER — LITHIUM CARBONATE 300 MG/1
1 TABLET, EXTENDED RELEASE ORAL
Qty: 0 | Refills: 0 | COMMUNITY
Start: 2017-03-06

## 2017-03-06 RX ORDER — CEFTRIAXONE 500 MG/1
1 INJECTION, POWDER, FOR SOLUTION INTRAMUSCULAR; INTRAVENOUS EVERY 24 HOURS
Qty: 0 | Refills: 0 | Status: DISCONTINUED | OUTPATIENT
Start: 2017-03-06 | End: 2017-03-06

## 2017-03-06 RX ORDER — RISPERIDONE 4 MG/1
1 TABLET ORAL
Qty: 0 | Refills: 0 | COMMUNITY
Start: 2017-03-06

## 2017-03-06 RX ADMIN — Medication 25 MILLIGRAM(S): at 05:20

## 2017-03-06 RX ADMIN — Medication 1 MILLIGRAM(S): at 14:44

## 2017-03-06 RX ADMIN — HEPARIN SODIUM 5000 UNIT(S): 5000 INJECTION INTRAVENOUS; SUBCUTANEOUS at 05:20

## 2017-03-06 RX ADMIN — Medication 25 MILLIGRAM(S): at 17:41

## 2017-03-06 RX ADMIN — PREGABALIN 100 MICROGRAM(S): 225 CAPSULE ORAL at 12:33

## 2017-03-06 RX ADMIN — Medication 1 MILLIGRAM(S): at 05:21

## 2017-03-06 RX ADMIN — HEPARIN SODIUM 5000 UNIT(S): 5000 INJECTION INTRAVENOUS; SUBCUTANEOUS at 14:45

## 2017-03-06 RX ADMIN — HEPARIN SODIUM 5000 UNIT(S): 5000 INJECTION INTRAVENOUS; SUBCUTANEOUS at 21:13

## 2017-03-06 RX ADMIN — LITHIUM CARBONATE 300 MILLIGRAM(S): 300 TABLET, EXTENDED RELEASE ORAL at 17:02

## 2017-03-06 RX ADMIN — LITHIUM CARBONATE 300 MILLIGRAM(S): 300 TABLET, EXTENDED RELEASE ORAL at 05:21

## 2017-03-06 RX ADMIN — Medication 1 MILLIGRAM(S): at 21:13

## 2017-03-06 RX ADMIN — LORATADINE 10 MILLIGRAM(S): 10 TABLET ORAL at 12:34

## 2017-03-06 RX ADMIN — PANTOPRAZOLE SODIUM 40 MILLIGRAM(S): 20 TABLET, DELAYED RELEASE ORAL at 05:20

## 2017-03-06 RX ADMIN — ANASTROZOLE 1 MILLIGRAM(S): 1 TABLET ORAL at 12:32

## 2017-03-06 NOTE — DISCHARGE NOTE ADULT - SECONDARY DIAGNOSIS.
Acute kidney injury Bipolar 1 disorder Chronic obstructive pulmonary disease, unspecified COPD type Parkinson's disease Other hyperlipidemia

## 2017-03-06 NOTE — DISCHARGE NOTE ADULT - MEDICATION SUMMARY - MEDICATIONS TO TAKE
I will START or STAY ON the medications listed below when I get home from the hospital:    acetylcysteine 10% inhalation solution  -- 3 milliliter(s) inhaled 3 times a day  -- Indication: For COPD (chronic obstructive pulmonary disease)    loratadine 10 mg oral tablet  -- 1 tab(s) by mouth once a day  -- Indication: For COPD (chronic obstructive pulmonary disease)    simvastatin 40 mg oral tablet  -- 1 tab(s) by mouth once a day (at bedtime)  -- Indication: For HLD    anastrozole 1 mg oral tablet  -- 1 tab(s) by mouth once a day  -- Indication: For CANCER    trihexyphenidyl  -- 1 milligram(s) by mouth 3 times a day  -- Indication: For Parkinson's disease    lithium 300 mg oral tablet, extended release  -- 1 tab(s) by mouth 2 times a day  -- Indication: For Bipolar 1 disorder    risperiDONE 1 mg oral tablet  -- 1 tab(s) by mouth once a day  -- Indication: For Bipolar 1 disorder    albuterol-ipratropium 2.5 mg-0.5 mg/3 mL inhalation solution  -- 3 milliliter(s) inhaled every 6 hours  -- Indication: For COPD (chronic obstructive pulmonary disease)    bethanechol 25 mg oral tablet  -- 1 tab(s) by mouth 2 times a day  -- Indication: For Parkinson's disease    omeprazole 20 mg oral delayed release tablet  -- 1 tab(s) by mouth once a day  -- Indication: For PPX    cyanocobalamin 100 mcg oral tablet  -- 1 tab(s) by mouth once a day  -- Indication: For PPX I will START or STAY ON the medications listed below when I get home from the hospital:    loratadine 10 mg oral tablet  -- 1 tab(s) by mouth once a day  -- Indication: For COPD (chronic obstructive pulmonary disease)    simvastatin 40 mg oral tablet  -- 1 tab(s) by mouth once a day (at bedtime)  -- Indication: For HLD    anastrozole 1 mg oral tablet  -- 1 tab(s) by mouth once a day  -- Indication: For CANCER    trihexyphenidyl  -- 1 milligram(s) by mouth 3 times a day  -- Indication: For Parkinson's disease    lithium 300 mg oral tablet, extended release  -- 1 tab(s) by mouth 2 times a day  -- Indication: For Bipolar 1 disorder    risperiDONE 1 mg oral tablet  -- 1 tab(s) by mouth once a day  -- Indication: For Bipolar 1 disorder    albuterol-ipratropium 2.5 mg-0.5 mg/3 mL inhalation solution  -- 3 milliliter(s) inhaled every 6 hours  -- Indication: For COPD (chronic obstructive pulmonary disease)    bethanechol 25 mg oral tablet  -- 1 tab(s) by mouth 2 times a day  -- Indication: For Parkinson's disease    omeprazole 20 mg oral delayed release tablet  -- 1 tab(s) by mouth once a day  -- Indication: For PPX    cyanocobalamin 100 mcg oral tablet  -- 1 tab(s) by mouth once a day  -- Indication: For PPX I will START or STAY ON the medications listed below when I get home from the hospital:    loratadine 10 mg oral tablet  -- 1 tab(s) by mouth once a day  -- Indication: For Allergies    simvastatin 40 mg oral tablet  -- 1 tab(s) by mouth once a day (at bedtime)  -- Indication: For HLD    anastrozole 1 mg oral tablet  -- 1 tab(s) by mouth once a day  -- Indication: For Cancer    trihexyphenidyl 2 mg oral tablet  -- 0.5 tab(s) by mouth 3 times a day  -- It is very important that you take or use this exactly as directed.  Do not skip doses or discontinue unless directed by your doctor.  May cause drowsiness.  Alcohol may intensify this effect.  Use care when operating dangerous machinery.  Obtain medical advice before taking any non-prescription drugs as some may affect the action of this medication.    -- Indication: For Parkinsoni    risperiDONE 1 mg oral tablet  -- 1 tab(s) by mouth once a day  -- Indication: For Bipolar 1 disorder    lithium 300 mg oral tablet, extended release  -- 1 tab(s) by mouth 2 times a day  -- Indication: For Bipolar 1 disorder    albuterol-ipratropium 2.5 mg-0.5 mg/3 mL inhalation solution  -- 3 milliliter(s) inhaled every 6 hours  -- Indication: For COPD (chronic obstructive pulmonary disease)    bethanechol 25 mg oral tablet  -- 1 tab(s) by mouth 2 times a day  -- Indication: For Bipolar 1 disorder    omeprazole 20 mg oral delayed release tablet  -- 1 tab(s) by mouth once a day  -- Indication: For Ppx    cyanocobalamin 100 mcg oral tablet  -- 1 tab(s) by mouth once a day  -- Indication: For Ppx

## 2017-03-06 NOTE — DISCHARGE NOTE ADULT - CARE PLAN
Principal Discharge DX:	Fall, initial encounter  Goal:	r/o fractures, acute injuries  Instructions for follow-up, activity and diet:	Patient refused x rays and Ct head, deemed to have capacity by psych  Secondary Diagnosis:	Acute kidney injury  Instructions for follow-up, activity and diet:	Likely Brandon on Ckd, now at baseline, encourage oral water intake  Secondary Diagnosis:	Bipolar 1 disorder  Instructions for follow-up, activity and diet:	c/w home ledications, f/u lithium level at NH  Secondary Diagnosis:	Chronic obstructive pulmonary disease, unspecified COPD type  Instructions for follow-up, activity and diet:	c/w duoneb prn  Secondary Diagnosis:	Parkinson's disease  Instructions for follow-up, activity and diet:	c/w home medications  Secondary Diagnosis:	Other hyperlipidemia  Instructions for follow-up, activity and diet:	c/w statin Principal Discharge DX:	Fall, initial encounter  Goal:	r/o fractures, acute injuries  Instructions for follow-up, activity and diet:	Patient refused x rays and Ct head, deemed to have capacity by psych  Secondary Diagnosis:	Acute kidney injury  Instructions for follow-up, activity and diet:	Likely Brandon on Ckd, now at baseline, encourage oral water intake  Secondary Diagnosis:	Bipolar 1 disorder  Instructions for follow-up, activity and diet:	c/w home medications, hold dose today as level 1.8, repeat lithium level am and may need to decrease dose lithium 300 am and 150 pm  Secondary Diagnosis:	Chronic obstructive pulmonary disease, unspecified COPD type  Instructions for follow-up, activity and diet:	c/w duoneb prn  Secondary Diagnosis:	Parkinson's disease  Instructions for follow-up, activity and diet:	c/w home medications  Secondary Diagnosis:	Other hyperlipidemia  Instructions for follow-up, activity and diet:	c/w statin

## 2017-03-06 NOTE — DISCHARGE NOTE ADULT - MEDICATION SUMMARY - MEDICATIONS TO STOP TAKING
I will STOP taking the medications listed below when I get home from the hospital:    Acidophilus oral capsule  -- 1 cap(s) by mouth once a day    calcitonin 200 intl units/mL injectable solution  -- 280 unit(s) injectable 2 times a day stop after 4 doses    meropenem 1000 mg intravenous injection  -- 1000 milligram(s) intravenous every 8 hours x 3 more days

## 2017-03-06 NOTE — DISCHARGE NOTE ADULT - HOSPITAL COURSE
76 y/o F , walks with a walker at baseline, from [facility] PMH of COPD, anemia, Parkinson's disease, HLD, Bipolar Disorder (on lithium), Breast Cancer, CKD and recent discharge from [facility] (Jan 2017) after treatment for AMS sec to MDR Ecoli  UTI was treated with Invanz last admission  was sent to ED s/p Fall x 2  at NH. Patient refused to give any further history saying "she did not want to talk about it". In Ed patient refused x ray, ct head, refused blood tests, seen by Psychiatry Dr Hoskins who said patient has capacity. Patient was found to have leucocytosis 16, trending down, no source and asymptomatic, patient is afebrile so will hold off abx for now per attending. Urine culture growing kliebsiella >100,000 but Ua negative. Seen by PT recommend rehab. Patient ready for discharge, to repeat CBC and lithium levels in am 78 y/o F , walks with a walker at baseline, from [facility] PMH of COPD, anemia, Parkinson's disease, HLD, Bipolar Disorder (on lithium), Breast Cancer, CKD and recent discharge from [facility] (Jan 2017) after treatment for AMS sec to MDR Ecoli  UTI was treated with Invanz last admission  was sent to ED s/p Fall x 2  at NH. Patient refused to give any further history saying "she did not want to talk about it".  Was found to be hypernatremic to 150, received IVF and encouraged po inake, hypernatremia resolved, likely was secondary to dehydration. In Ed patient refused x ray, ct head, refused blood tests, seen by Psychiatry Dr Hoskins who said patient has capacity. Patient was found to have leucocytosis 16, trending down, no source and asymptomatic, patient is afebrile so will hold off abx for now per attending. Urine culture growing kliebsiella >100,000 but Ua negative. Seen by PT recommend rehab. Patient ready for discharge, to repeat CBC to follow leukocytosis and lithium levels in am. 76 y/o F , walks with a walker at baseline, from [facility] PMH of COPD, anemia, Parkinson's disease, HLD, Bipolar Disorder (on lithium), Breast Cancer, CKD and recent discharge from [facility] (Jan 2017) after treatment for AMS sec to MDR Ecoli  UTI was treated with Invanz last admission  was sent to ED s/p Fall x 2  at NH. Patient refused to give any further history saying "she did not want to talk about it".  Was found to be hypernatremic to 150, received IVF and encouraged po inake, hypernatremia resolved, likely was secondary to dehydration. In Ed patient refused x ray, ct head, refused blood tests, seen by Psychiatry Dr Hoskins who said patient has capacity. Patient was found to have leucocytosis 16, trending down, no source and asymptomatic, patient is afebrile so will hold off abx for now per attending. Urine culture growing kliebsiella >100,000 but Ua negative. Seen by PT recommend rehab. Patient ready for discharge, to repeat CBC to follow leukocytosis and lithium levels in am. Lithium level was 1.8, will hold dose today and then restart tomorrow at lower dose 300 +150 mg.

## 2017-03-06 NOTE — DISCHARGE NOTE ADULT - PATIENT PORTAL LINK FT
“You can access the [facility] Patient Portal, offered by [facility], by registering with the following website: [email or web address]”

## 2017-03-07 RX ADMIN — HEPARIN SODIUM 5000 UNIT(S): 5000 INJECTION INTRAVENOUS; SUBCUTANEOUS at 15:40

## 2017-03-07 RX ADMIN — Medication 25 MILLIGRAM(S): at 17:57

## 2017-03-07 RX ADMIN — LITHIUM CARBONATE 300 MILLIGRAM(S): 300 TABLET, EXTENDED RELEASE ORAL at 17:57

## 2017-03-07 RX ADMIN — LITHIUM CARBONATE 300 MILLIGRAM(S): 300 TABLET, EXTENDED RELEASE ORAL at 05:16

## 2017-03-07 RX ADMIN — Medication 1 MILLIGRAM(S): at 21:31

## 2017-03-07 RX ADMIN — ANASTROZOLE 1 MILLIGRAM(S): 1 TABLET ORAL at 12:08

## 2017-03-07 RX ADMIN — HEPARIN SODIUM 5000 UNIT(S): 5000 INJECTION INTRAVENOUS; SUBCUTANEOUS at 05:16

## 2017-03-07 RX ADMIN — SIMVASTATIN 40 MILLIGRAM(S): 20 TABLET, FILM COATED ORAL at 21:31

## 2017-03-07 RX ADMIN — Medication 25 MILLIGRAM(S): at 05:16

## 2017-03-07 RX ADMIN — Medication 1 MILLIGRAM(S): at 15:41

## 2017-03-07 RX ADMIN — RISPERIDONE 1 MILLIGRAM(S): 4 TABLET ORAL at 12:07

## 2017-03-07 RX ADMIN — HEPARIN SODIUM 5000 UNIT(S): 5000 INJECTION INTRAVENOUS; SUBCUTANEOUS at 21:31

## 2017-03-07 RX ADMIN — Medication 1 MILLIGRAM(S): at 05:16

## 2017-03-07 RX ADMIN — PANTOPRAZOLE SODIUM 40 MILLIGRAM(S): 20 TABLET, DELAYED RELEASE ORAL at 05:16

## 2017-03-07 RX ADMIN — PREGABALIN 100 MICROGRAM(S): 225 CAPSULE ORAL at 12:07

## 2017-03-07 RX ADMIN — LORATADINE 10 MILLIGRAM(S): 10 TABLET ORAL at 12:07

## 2017-03-08 RX ADMIN — LITHIUM CARBONATE 300 MILLIGRAM(S): 300 TABLET, EXTENDED RELEASE ORAL at 17:12

## 2017-03-08 RX ADMIN — PREGABALIN 100 MICROGRAM(S): 225 CAPSULE ORAL at 11:34

## 2017-03-08 RX ADMIN — Medication 25 MILLIGRAM(S): at 17:12

## 2017-03-08 RX ADMIN — LITHIUM CARBONATE 300 MILLIGRAM(S): 300 TABLET, EXTENDED RELEASE ORAL at 05:13

## 2017-03-08 RX ADMIN — HEPARIN SODIUM 5000 UNIT(S): 5000 INJECTION INTRAVENOUS; SUBCUTANEOUS at 22:09

## 2017-03-08 RX ADMIN — Medication 25 MILLIGRAM(S): at 05:13

## 2017-03-08 RX ADMIN — Medication 1 MILLIGRAM(S): at 22:10

## 2017-03-08 RX ADMIN — Medication 1 MILLIGRAM(S): at 05:13

## 2017-03-08 RX ADMIN — PANTOPRAZOLE SODIUM 40 MILLIGRAM(S): 20 TABLET, DELAYED RELEASE ORAL at 05:13

## 2017-03-08 RX ADMIN — LORATADINE 10 MILLIGRAM(S): 10 TABLET ORAL at 11:34

## 2017-03-08 RX ADMIN — SIMVASTATIN 40 MILLIGRAM(S): 20 TABLET, FILM COATED ORAL at 22:10

## 2017-03-08 RX ADMIN — HEPARIN SODIUM 5000 UNIT(S): 5000 INJECTION INTRAVENOUS; SUBCUTANEOUS at 05:14

## 2017-03-08 RX ADMIN — RISPERIDONE 1 MILLIGRAM(S): 4 TABLET ORAL at 11:34

## 2017-03-08 RX ADMIN — Medication 1 MILLIGRAM(S): at 13:32

## 2017-03-08 RX ADMIN — HEPARIN SODIUM 5000 UNIT(S): 5000 INJECTION INTRAVENOUS; SUBCUTANEOUS at 13:32

## 2017-03-08 RX ADMIN — ANASTROZOLE 1 MILLIGRAM(S): 1 TABLET ORAL at 11:33

## 2017-03-09 VITALS — SYSTOLIC BLOOD PRESSURE: 125 MMHG | OXYGEN SATURATION: 93 % | DIASTOLIC BLOOD PRESSURE: 69 MMHG | HEART RATE: 90 BPM

## 2017-03-09 LAB
ANION GAP SERPL CALC-SCNC: 8 MMOL/L — SIGNIFICANT CHANGE UP (ref 5–17)
BASOPHILS # BLD AUTO: 0.1 K/UL — SIGNIFICANT CHANGE UP (ref 0–0.2)
BASOPHILS NFR BLD AUTO: 0.9 % — SIGNIFICANT CHANGE UP (ref 0–2)
BUN SERPL-MCNC: 24 MG/DL — HIGH (ref 7–18)
CALCIUM SERPL-MCNC: 10.6 MG/DL — HIGH (ref 8.4–10.5)
CHLORIDE SERPL-SCNC: 104 MMOL/L — SIGNIFICANT CHANGE UP (ref 96–108)
CO2 SERPL-SCNC: 30 MMOL/L — SIGNIFICANT CHANGE UP (ref 22–31)
CREAT SERPL-MCNC: 1.6 MG/DL — HIGH (ref 0.5–1.3)
EOSINOPHIL # BLD AUTO: 0.9 K/UL — HIGH (ref 0–0.5)
EOSINOPHIL NFR BLD AUTO: 7.6 % — HIGH (ref 0–6)
GLUCOSE SERPL-MCNC: 93 MG/DL — SIGNIFICANT CHANGE UP (ref 70–99)
HCT VFR BLD CALC: 33.7 % — LOW (ref 34.5–45)
HGB BLD-MCNC: 10.7 G/DL — LOW (ref 11.5–15.5)
LITHIUM SERPL-MCNC: 1.8 MMOL/L — CRITICAL HIGH (ref 0.6–1.2)
LYMPHOCYTES # BLD AUTO: 2.6 K/UL — SIGNIFICANT CHANGE UP (ref 1–3.3)
LYMPHOCYTES # BLD AUTO: 21.1 % — SIGNIFICANT CHANGE UP (ref 13–44)
MCHC RBC-ENTMCNC: 27.8 PG — SIGNIFICANT CHANGE UP (ref 27–34)
MCHC RBC-ENTMCNC: 31.8 GM/DL — LOW (ref 32–36)
MCV RBC AUTO: 87.6 FL — SIGNIFICANT CHANGE UP (ref 80–100)
MONOCYTES # BLD AUTO: 0.7 K/UL — SIGNIFICANT CHANGE UP (ref 0–0.9)
MONOCYTES NFR BLD AUTO: 6 % — SIGNIFICANT CHANGE UP (ref 2–14)
NEUTROPHILS # BLD AUTO: 8.1 K/UL — HIGH (ref 1.8–7.4)
NEUTROPHILS NFR BLD AUTO: 64.5 % — SIGNIFICANT CHANGE UP (ref 43–77)
PLATELET # BLD AUTO: 267 K/UL — SIGNIFICANT CHANGE UP (ref 150–400)
POTASSIUM SERPL-MCNC: 4 MMOL/L — SIGNIFICANT CHANGE UP (ref 3.5–5.3)
POTASSIUM SERPL-SCNC: 4 MMOL/L — SIGNIFICANT CHANGE UP (ref 3.5–5.3)
RBC # BLD: 3.85 M/UL — SIGNIFICANT CHANGE UP (ref 3.8–5.2)
RBC # FLD: 16.1 % — HIGH (ref 10.3–14.5)
SODIUM SERPL-SCNC: 142 MMOL/L — SIGNIFICANT CHANGE UP (ref 135–145)
WBC # BLD: 12.5 K/UL — HIGH (ref 3.8–10.5)
WBC # FLD AUTO: 12.5 K/UL — HIGH (ref 3.8–10.5)

## 2017-03-09 PROCEDURE — 83935 ASSAY OF URINE OSMOLALITY: CPT

## 2017-03-09 PROCEDURE — 87186 SC STD MICRODIL/AGAR DIL: CPT

## 2017-03-09 PROCEDURE — 97530 THERAPEUTIC ACTIVITIES: CPT

## 2017-03-09 PROCEDURE — 93005 ELECTROCARDIOGRAM TRACING: CPT

## 2017-03-09 PROCEDURE — 82436 ASSAY OF URINE CHLORIDE: CPT

## 2017-03-09 PROCEDURE — 85027 COMPLETE CBC AUTOMATED: CPT

## 2017-03-09 PROCEDURE — 97110 THERAPEUTIC EXERCISES: CPT

## 2017-03-09 PROCEDURE — 87086 URINE CULTURE/COLONY COUNT: CPT

## 2017-03-09 PROCEDURE — 82570 ASSAY OF URINE CREATININE: CPT

## 2017-03-09 PROCEDURE — 84133 ASSAY OF URINE POTASSIUM: CPT

## 2017-03-09 PROCEDURE — 80048 BASIC METABOLIC PNL TOTAL CA: CPT

## 2017-03-09 PROCEDURE — 71045 X-RAY EXAM CHEST 1 VIEW: CPT

## 2017-03-09 PROCEDURE — 80178 ASSAY OF LITHIUM: CPT

## 2017-03-09 PROCEDURE — 97116 GAIT TRAINING THERAPY: CPT

## 2017-03-09 PROCEDURE — 99285 EMERGENCY DEPT VISIT HI MDM: CPT | Mod: 25

## 2017-03-09 PROCEDURE — 81001 URINALYSIS AUTO W/SCOPE: CPT

## 2017-03-09 PROCEDURE — 94640 AIRWAY INHALATION TREATMENT: CPT

## 2017-03-09 PROCEDURE — 84156 ASSAY OF PROTEIN URINE: CPT

## 2017-03-09 PROCEDURE — 84300 ASSAY OF URINE SODIUM: CPT

## 2017-03-09 RX ORDER — SIMVASTATIN 20 MG/1
1 TABLET, FILM COATED ORAL
Qty: 30 | Refills: 0 | OUTPATIENT
Start: 2017-03-09 | End: 2017-04-08

## 2017-03-09 RX ORDER — IPRATROPIUM/ALBUTEROL SULFATE 18-103MCG
3 AEROSOL WITH ADAPTER (GRAM) INHALATION
Qty: 120 | Refills: 0 | OUTPATIENT
Start: 2017-03-09 | End: 2017-04-08

## 2017-03-09 RX ORDER — LITHIUM CARBONATE 300 MG/1
1 TABLET, EXTENDED RELEASE ORAL
Qty: 60 | Refills: 0 | OUTPATIENT
Start: 2017-03-09 | End: 2017-04-08

## 2017-03-09 RX ORDER — IPRATROPIUM/ALBUTEROL SULFATE 18-103MCG
3 AEROSOL WITH ADAPTER (GRAM) INHALATION EVERY 6 HOURS
Qty: 0 | Refills: 0 | Status: DISCONTINUED | OUTPATIENT
Start: 2017-03-09 | End: 2017-03-09

## 2017-03-09 RX ORDER — LORATADINE 10 MG/1
1 TABLET ORAL
Qty: 30 | Refills: 0 | OUTPATIENT
Start: 2017-03-09 | End: 2017-04-08

## 2017-03-09 RX ORDER — LITHIUM CARBONATE 300 MG/1
150 TABLET, EXTENDED RELEASE ORAL
Qty: 0 | Refills: 0 | Status: DISCONTINUED | OUTPATIENT
Start: 2017-03-09 | End: 2017-03-09

## 2017-03-09 RX ORDER — RISPERIDONE 4 MG/1
1 TABLET ORAL
Qty: 30 | Refills: 0 | OUTPATIENT
Start: 2017-03-09 | End: 2017-04-08

## 2017-03-09 RX ORDER — LITHIUM CARBONATE 300 MG/1
150 TABLET, EXTENDED RELEASE ORAL DAILY
Qty: 0 | Refills: 0 | Status: DISCONTINUED | OUTPATIENT
Start: 2017-03-09 | End: 2017-03-09

## 2017-03-09 RX ORDER — SODIUM CHLORIDE 9 MG/ML
1000 INJECTION INTRAMUSCULAR; INTRAVENOUS; SUBCUTANEOUS
Qty: 0 | Refills: 0 | Status: DISCONTINUED | OUTPATIENT
Start: 2017-03-09 | End: 2017-03-09

## 2017-03-09 RX ORDER — TRIHEXYPHENIDYL HCL 2 MG
0.5 TABLET ORAL
Qty: 45 | Refills: 0 | OUTPATIENT
Start: 2017-03-09 | End: 2017-04-08

## 2017-03-09 RX ORDER — BETHANECHOL CHLORIDE 25 MG
1 TABLET ORAL
Qty: 60 | Refills: 0 | OUTPATIENT
Start: 2017-03-09 | End: 2017-04-08

## 2017-03-09 RX ORDER — ANASTROZOLE 1 MG/1
1 TABLET ORAL
Qty: 30 | Refills: 0 | OUTPATIENT
Start: 2017-03-09 | End: 2017-04-08

## 2017-03-09 RX ORDER — OMEPRAZOLE 10 MG/1
1 CAPSULE, DELAYED RELEASE ORAL
Qty: 30 | Refills: 0 | OUTPATIENT
Start: 2017-03-09 | End: 2017-04-08

## 2017-03-09 RX ORDER — PREGABALIN 225 MG/1
1 CAPSULE ORAL
Qty: 30 | Refills: 0 | OUTPATIENT
Start: 2017-03-09 | End: 2017-04-08

## 2017-03-09 RX ADMIN — LITHIUM CARBONATE 300 MILLIGRAM(S): 300 TABLET, EXTENDED RELEASE ORAL at 05:55

## 2017-03-09 RX ADMIN — HEPARIN SODIUM 5000 UNIT(S): 5000 INJECTION INTRAVENOUS; SUBCUTANEOUS at 13:18

## 2017-03-09 RX ADMIN — PANTOPRAZOLE SODIUM 40 MILLIGRAM(S): 20 TABLET, DELAYED RELEASE ORAL at 05:55

## 2017-03-09 RX ADMIN — Medication 1 MILLIGRAM(S): at 05:55

## 2017-03-09 RX ADMIN — LORATADINE 10 MILLIGRAM(S): 10 TABLET ORAL at 11:07

## 2017-03-09 RX ADMIN — Medication 25 MILLIGRAM(S): at 05:56

## 2017-03-09 RX ADMIN — RISPERIDONE 1 MILLIGRAM(S): 4 TABLET ORAL at 11:07

## 2017-03-09 RX ADMIN — PREGABALIN 100 MICROGRAM(S): 225 CAPSULE ORAL at 11:07

## 2017-03-09 RX ADMIN — ANASTROZOLE 1 MILLIGRAM(S): 1 TABLET ORAL at 11:07

## 2017-03-09 RX ADMIN — HEPARIN SODIUM 5000 UNIT(S): 5000 INJECTION INTRAVENOUS; SUBCUTANEOUS at 05:55

## 2017-03-09 NOTE — DIETITIAN INITIAL EVALUATION ADULT. - SIGNS/SYMPTOMS
likely inadequate intake PTA,  9kg wt loss , time frame not available ( need to confirm with family)

## 2017-03-09 NOTE — DIETITIAN INITIAL EVALUATION ADULT. - PROBLEM SELECTOR PLAN 1
Pt has 2 episodes of Fall today likely Mechanical or due to delayed motor responses or UTI.  f/u UA, informed RN.  Pt refused orthostatics  Xray pelvis refused by pt.  f/u CT head  f/u PT eval

## 2017-03-09 NOTE — DIETITIAN INITIAL EVALUATION ADULT. - OTHER INFO
Patient reports 9kg wt loss but unsure of time frame.  Patient previously diagnosed as malnourished ( nutrition note, 1/19/17), skin-intact

## 2017-03-09 NOTE — DIETITIAN INITIAL EVALUATION ADULT. - MD RECOMMEND
continue with Regular diet per family's insistence, oral supplements as needed, consider swallow evaluation if family amenable, continue with Regular diet per family's insistence, oral supplements as needed, consider swallow evaluation if family amenable, obtain weight and diet history from family when able

## 2017-03-09 NOTE — DIETITIAN INITIAL EVALUATION ADULT. - PROBLEM SELECTOR PLAN 2
Pt has TAMARA likely sec to prerenal as pt has dehydration in past also.  NS @ 60 cc/hr  f/u BMP  f/u urine lytes

## 2017-03-20 DIAGNOSIS — Z88.5 ALLERGY STATUS TO NARCOTIC AGENT: ICD-10-CM

## 2017-03-20 DIAGNOSIS — I12.9 HYPERTENSIVE CHRONIC KIDNEY DISEASE WITH STAGE 1 THROUGH STAGE 4 CHRONIC KIDNEY DISEASE, OR UNSPECIFIED CHRONIC KIDNEY DISEASE: ICD-10-CM

## 2017-03-20 DIAGNOSIS — R26.81 UNSTEADINESS ON FEET: ICD-10-CM

## 2017-03-20 DIAGNOSIS — E44.0 MODERATE PROTEIN-CALORIE MALNUTRITION: ICD-10-CM

## 2017-03-20 DIAGNOSIS — E87.0 HYPEROSMOLALITY AND HYPERNATREMIA: ICD-10-CM

## 2017-03-20 DIAGNOSIS — E78.4 OTHER HYPERLIPIDEMIA: ICD-10-CM

## 2017-03-20 DIAGNOSIS — Z85.3 PERSONAL HISTORY OF MALIGNANT NEOPLASM OF BREAST: ICD-10-CM

## 2017-03-20 DIAGNOSIS — D64.9 ANEMIA, UNSPECIFIED: ICD-10-CM

## 2017-03-20 DIAGNOSIS — Z88.8 ALLERGY STATUS TO OTHER DRUGS, MEDICAMENTS AND BIOLOGICAL SUBSTANCES STATUS: ICD-10-CM

## 2017-03-20 DIAGNOSIS — N18.3 CHRONIC KIDNEY DISEASE, STAGE 3 (MODERATE): ICD-10-CM

## 2017-03-20 DIAGNOSIS — E86.0 DEHYDRATION: ICD-10-CM

## 2017-03-20 DIAGNOSIS — G20 PARKINSON'S DISEASE: ICD-10-CM
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
## 2017-03-20 DIAGNOSIS — B96.1 KLEBSIELLA PNEUMONIAE [K. PNEUMONIAE] AS THE CAUSE OF DISEASES CLASSIFIED ELSEWHERE: ICD-10-CM

## 2017-03-20 DIAGNOSIS — J44.9 CHRONIC OBSTRUCTIVE PULMONARY DISEASE, UNSPECIFIED: ICD-10-CM

## 2017-03-20 DIAGNOSIS — N39.0 URINARY TRACT INFECTION, SITE NOT SPECIFIED: ICD-10-CM

## 2017-03-20 DIAGNOSIS — F31.9 BIPOLAR DISORDER, UNSPECIFIED: ICD-10-CM

## 2017-04-04 ENCOUNTER — EMERGENCY (EMERGENCY)
Facility: HOSPITAL | Age: 77
LOS: 1 days | Discharge: HOME | End: 2017-04-04
Attending: EMERGENCY MEDICINE
Payer: MEDICARE

## 2017-04-04 VITALS
TEMPERATURE: 98 F | SYSTOLIC BLOOD PRESSURE: 111 MMHG | OXYGEN SATURATION: 97 % | HEART RATE: 65 BPM | RESPIRATION RATE: 17 BRPM | DIASTOLIC BLOOD PRESSURE: 88 MMHG

## 2017-04-04 VITALS
DIASTOLIC BLOOD PRESSURE: 52 MMHG | HEART RATE: 63 BPM | OXYGEN SATURATION: 98 % | SYSTOLIC BLOOD PRESSURE: 116 MMHG | RESPIRATION RATE: 17 BRPM | TEMPERATURE: 98 F

## 2017-04-04 DIAGNOSIS — J44.9 CHRONIC OBSTRUCTIVE PULMONARY DISEASE, UNSPECIFIED: ICD-10-CM

## 2017-04-04 DIAGNOSIS — Z88.5 ALLERGY STATUS TO NARCOTIC AGENT: ICD-10-CM

## 2017-04-04 DIAGNOSIS — N28.9 DISORDER OF KIDNEY AND URETER, UNSPECIFIED: ICD-10-CM

## 2017-04-04 DIAGNOSIS — E87.0 HYPEROSMOLALITY AND HYPERNATREMIA: ICD-10-CM

## 2017-04-04 DIAGNOSIS — F03.90 UNSPECIFIED DEMENTIA, UNSPECIFIED SEVERITY, WITHOUT BEHAVIORAL DISTURBANCE, PSYCHOTIC DISTURBANCE, MOOD DISTURBANCE, AND ANXIETY: ICD-10-CM

## 2017-04-04 DIAGNOSIS — E78.5 HYPERLIPIDEMIA, UNSPECIFIED: ICD-10-CM

## 2017-04-04 DIAGNOSIS — Z88.8 ALLERGY STATUS TO OTHER DRUGS, MEDICAMENTS AND BIOLOGICAL SUBSTANCES STATUS: ICD-10-CM

## 2017-04-04 LAB
ANION GAP SERPL CALC-SCNC: 7 MMOL/L — SIGNIFICANT CHANGE UP (ref 5–17)
BASOPHILS # BLD AUTO: 0.2 K/UL — SIGNIFICANT CHANGE UP (ref 0–0.2)
BASOPHILS NFR BLD AUTO: 1 % — SIGNIFICANT CHANGE UP (ref 0–2)
BUN SERPL-MCNC: 22 MG/DL — HIGH (ref 7–18)
CALCIUM SERPL-MCNC: 10.3 MG/DL — SIGNIFICANT CHANGE UP (ref 8.4–10.5)
CHLORIDE SERPL-SCNC: 110 MMOL/L — HIGH (ref 96–108)
CO2 SERPL-SCNC: 25 MMOL/L — SIGNIFICANT CHANGE UP (ref 22–31)
CREAT SERPL-MCNC: 1.49 MG/DL — HIGH (ref 0.5–1.3)
EOSINOPHIL # BLD AUTO: 0.6 K/UL — HIGH (ref 0–0.5)
EOSINOPHIL NFR BLD AUTO: 3.6 % — SIGNIFICANT CHANGE UP (ref 0–6)
GLUCOSE SERPL-MCNC: 100 MG/DL — HIGH (ref 70–99)
HCT VFR BLD CALC: 33.2 % — LOW (ref 34.5–45)
HGB BLD-MCNC: 10.2 G/DL — LOW (ref 11.5–15.5)
LYMPHOCYTES # BLD AUTO: 23.7 % — SIGNIFICANT CHANGE UP (ref 13–44)
LYMPHOCYTES # BLD AUTO: 3.9 K/UL — HIGH (ref 1–3.3)
MCHC RBC-ENTMCNC: 27.5 PG — SIGNIFICANT CHANGE UP (ref 27–34)
MCHC RBC-ENTMCNC: 30.9 GM/DL — LOW (ref 32–36)
MCV RBC AUTO: 89.1 FL — SIGNIFICANT CHANGE UP (ref 80–100)
MONOCYTES # BLD AUTO: 0.7 K/UL — SIGNIFICANT CHANGE UP (ref 0–0.9)
MONOCYTES NFR BLD AUTO: 4.4 % — SIGNIFICANT CHANGE UP (ref 2–14)
NEUTROPHILS # BLD AUTO: 10.9 K/UL — HIGH (ref 1.8–7.4)
NEUTROPHILS NFR BLD AUTO: 67.2 % — SIGNIFICANT CHANGE UP (ref 43–77)
PLATELET # BLD AUTO: 234 K/UL — SIGNIFICANT CHANGE UP (ref 150–400)
POTASSIUM SERPL-MCNC: 4.2 MMOL/L — SIGNIFICANT CHANGE UP (ref 3.5–5.3)
POTASSIUM SERPL-SCNC: 4.2 MMOL/L — SIGNIFICANT CHANGE UP (ref 3.5–5.3)
RBC # BLD: 3.72 M/UL — LOW (ref 3.8–5.2)
RBC # FLD: 15.7 % — HIGH (ref 10.3–14.5)
SODIUM SERPL-SCNC: 142 MMOL/L — SIGNIFICANT CHANGE UP (ref 135–145)
WBC # BLD: 16.2 K/UL — HIGH (ref 3.8–10.5)
WBC # FLD AUTO: 16.2 K/UL — HIGH (ref 3.8–10.5)

## 2017-04-04 PROCEDURE — 99283 EMERGENCY DEPT VISIT LOW MDM: CPT

## 2017-04-04 PROCEDURE — 85027 COMPLETE CBC AUTOMATED: CPT

## 2017-04-04 PROCEDURE — 71010: CPT | Mod: 26

## 2017-04-04 PROCEDURE — 71045 X-RAY EXAM CHEST 1 VIEW: CPT

## 2017-04-04 PROCEDURE — 36000 PLACE NEEDLE IN VEIN: CPT

## 2017-04-04 PROCEDURE — 80048 BASIC METABOLIC PNL TOTAL CA: CPT

## 2017-04-04 NOTE — ED PROVIDER NOTE - OBJECTIVE STATEMENT
78 y/o female with PMHx of COPD, Psychosis, R breast CA, Bipolar Disorder and Anemia sent into the ED for high sodium levels today. As per nursing home note, pt with sodium of 151 from labs drawn on 4/3/17. Pt denies abd pain, neck pain, back pain, CP, difficulty urinating, or any other complaints. NKDA.

## 2017-04-04 NOTE — ED PROVIDER NOTE - PROGRESS NOTE DETAILS
Patient's sister called and would like the patient to go home. She said the sodium issue has happened in the past. Will check labs and dc if similar to labs drawn yesterday. Patient in no distress, labs noted, cxr very rotated. Precautions reviewed. Patient is not hypoxic and in no distress. family would like her to go home. Patient in no distress, labs noted, cxr very rotated. No fever. Precautions reviewed. Patient is not hypoxic and in no distress. family would like her to go home.

## 2017-04-04 NOTE — ED PROVIDER NOTE - NS ED MD SCRIBE ATTENDING SCRIBE SECTIONS
HISTORY OF PRESENT ILLNESS/HIV/VITAL SIGNS( Pullset)/PHYSICAL EXAM/REVIEW OF SYSTEMS/PAST MEDICAL/SURGICAL/SOCIAL HISTORY/DISPOSITION

## 2017-04-04 NOTE — ED ADULT NURSE NOTE - OBJECTIVE STATEMENT
Pt was from NH, pt keeps saying "radiation, I did not have enough water". Pt was send due to high sodium (151). denies pain, fever chills, nausea, vomiting, headache, chest pain. Was noted shaking and generalized edema on arms and foot.

## 2017-06-21 ENCOUNTER — EMERGENCY (EMERGENCY)
Facility: HOSPITAL | Age: 77
LOS: 1 days | Discharge: HOME | End: 2017-06-21
Attending: EMERGENCY MEDICINE
Payer: MEDICARE

## 2017-06-21 VITALS
HEART RATE: 78 BPM | HEIGHT: 60 IN | RESPIRATION RATE: 18 BRPM | OXYGEN SATURATION: 97 % | SYSTOLIC BLOOD PRESSURE: 145 MMHG | WEIGHT: 139.99 LBS | TEMPERATURE: 98 F | DIASTOLIC BLOOD PRESSURE: 77 MMHG

## 2017-06-21 DIAGNOSIS — F41.9 ANXIETY DISORDER, UNSPECIFIED: ICD-10-CM

## 2017-06-21 DIAGNOSIS — Z88.5 ALLERGY STATUS TO NARCOTIC AGENT: ICD-10-CM

## 2017-06-21 DIAGNOSIS — F31.9 BIPOLAR DISORDER, UNSPECIFIED: ICD-10-CM

## 2017-06-21 DIAGNOSIS — Z87.891 PERSONAL HISTORY OF NICOTINE DEPENDENCE: ICD-10-CM

## 2017-06-21 DIAGNOSIS — J44.9 CHRONIC OBSTRUCTIVE PULMONARY DISEASE, UNSPECIFIED: ICD-10-CM

## 2017-06-21 DIAGNOSIS — G20 PARKINSON'S DISEASE: ICD-10-CM
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
## 2017-06-21 DIAGNOSIS — S00.01XA ABRASION OF SCALP, INITIAL ENCOUNTER: ICD-10-CM
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
## 2017-06-21 DIAGNOSIS — E78.5 HYPERLIPIDEMIA, UNSPECIFIED: ICD-10-CM

## 2017-06-21 PROCEDURE — 96372 THER/PROPH/DIAG INJ SC/IM: CPT

## 2017-06-21 PROCEDURE — 70450 CT HEAD/BRAIN W/O DYE: CPT

## 2017-06-21 PROCEDURE — 72125 CT NECK SPINE W/O DYE: CPT | Mod: 26

## 2017-06-21 PROCEDURE — 99284 EMERGENCY DEPT VISIT MOD MDM: CPT | Mod: 25

## 2017-06-21 PROCEDURE — 99284 EMERGENCY DEPT VISIT MOD MDM: CPT

## 2017-06-21 PROCEDURE — 70450 CT HEAD/BRAIN W/O DYE: CPT | Mod: 26

## 2017-06-21 PROCEDURE — 72125 CT NECK SPINE W/O DYE: CPT

## 2017-06-21 RX ORDER — MIDAZOLAM HYDROCHLORIDE 1 MG/ML
5 INJECTION, SOLUTION INTRAMUSCULAR; INTRAVENOUS ONCE
Qty: 0 | Refills: 0 | Status: DISCONTINUED | OUTPATIENT
Start: 2017-06-21 | End: 2017-06-21

## 2017-06-21 RX ADMIN — MIDAZOLAM HYDROCHLORIDE 5 MILLIGRAM(S): 1 INJECTION, SOLUTION INTRAMUSCULAR; INTRAVENOUS at 17:16

## 2017-06-21 NOTE — ED PROVIDER NOTE - PROGRESS NOTE DETAILS
Pt anxious for CT. Required sedation to obtain CT. attending note (debby abel): pt endorsed to me, CT head/neck wnl, will d/c home via ambulette

## 2017-06-21 NOTE — ED ADULT NURSE NOTE - OBJECTIVE STATEMENT
BIBA from NH  s/p fall today axox1 disoriented on time and place uncooperative denies any pain at present time

## 2017-06-21 NOTE — ED ADULT TRIAGE NOTE - CHIEF COMPLAINT QUOTE
via ambulance sent from assisted living s/p fall , hit  head w/ abrasions at the back of the head , no LOC

## 2017-06-21 NOTE — ED PROVIDER NOTE - OBJECTIVE STATEMENT
78 y/o F pt w/ PMHx of COPD, Parkinson's and HLD presents to the ED c/o fall today. Pt sustained a superficial abrasion while walking up the stairs today. Denies LOC, numbness/tingling, weakness or any other complaints. NKDA.

## 2017-06-21 NOTE — ED PROVIDER NOTE - MEDICAL DECISION MAKING DETAILS
Pt w/ superficial abrasion. Versa, cat scan and dc home. Pt w/ superficial abrasion. Versed for anxiety of ct, cat scan and dc home.

## 2018-01-11 NOTE — ED ADULT TRIAGE NOTE - BP NONINVASIVE SYSTOLIC (MM HG)
MD notified and Shelby. Will con't to monitor
Provider notified and Zofran to be given earlier. Will con't to monitor
115

## 2018-02-09 ENCOUNTER — EMERGENCY (EMERGENCY)
Facility: HOSPITAL | Age: 78
LOS: 1 days | Discharge: HOME | End: 2018-02-09
Attending: EMERGENCY MEDICINE | Admitting: EMERGENCY MEDICINE
Payer: MEDICARE

## 2018-02-09 VITALS
HEIGHT: 59 IN | HEART RATE: 70 BPM | WEIGHT: 123.9 LBS | DIASTOLIC BLOOD PRESSURE: 87 MMHG | SYSTOLIC BLOOD PRESSURE: 138 MMHG | TEMPERATURE: 98 F | OXYGEN SATURATION: 100 % | RESPIRATION RATE: 20 BRPM

## 2018-02-09 VITALS
SYSTOLIC BLOOD PRESSURE: 154 MMHG | HEART RATE: 55 BPM | OXYGEN SATURATION: 100 % | DIASTOLIC BLOOD PRESSURE: 90 MMHG | TEMPERATURE: 99 F | RESPIRATION RATE: 20 BRPM

## 2018-02-09 DIAGNOSIS — G20 PARKINSON'S DISEASE: ICD-10-CM

## 2018-02-09 DIAGNOSIS — R05 COUGH: ICD-10-CM

## 2018-02-09 DIAGNOSIS — F31.9 BIPOLAR DISORDER, UNSPECIFIED: ICD-10-CM

## 2018-02-09 DIAGNOSIS — Z88.5 ALLERGY STATUS TO NARCOTIC AGENT: ICD-10-CM

## 2018-02-09 DIAGNOSIS — E78.5 HYPERLIPIDEMIA, UNSPECIFIED: ICD-10-CM

## 2018-02-09 DIAGNOSIS — J44.9 CHRONIC OBSTRUCTIVE PULMONARY DISEASE, UNSPECIFIED: ICD-10-CM

## 2018-02-09 LAB
ALBUMIN SERPL ELPH-MCNC: 3.3 G/DL — LOW (ref 3.5–5)
ALP SERPL-CCNC: 120 U/L — SIGNIFICANT CHANGE UP (ref 40–120)
ALT FLD-CCNC: 17 U/L DA — SIGNIFICANT CHANGE UP (ref 10–60)
ANION GAP SERPL CALC-SCNC: 8 MMOL/L — SIGNIFICANT CHANGE UP (ref 5–17)
AST SERPL-CCNC: 9 U/L — LOW (ref 10–40)
BASOPHILS # BLD AUTO: 0.1 K/UL — SIGNIFICANT CHANGE UP (ref 0–0.2)
BASOPHILS NFR BLD AUTO: 1.2 % — SIGNIFICANT CHANGE UP (ref 0–2)
BILIRUB SERPL-MCNC: 0.7 MG/DL — SIGNIFICANT CHANGE UP (ref 0.2–1.2)
BUN SERPL-MCNC: 29 MG/DL — HIGH (ref 7–18)
CALCIUM SERPL-MCNC: 10.5 MG/DL — SIGNIFICANT CHANGE UP (ref 8.4–10.5)
CHLORIDE SERPL-SCNC: 113 MMOL/L — HIGH (ref 96–108)
CO2 SERPL-SCNC: 23 MMOL/L — SIGNIFICANT CHANGE UP (ref 22–31)
CREAT SERPL-MCNC: 1.38 MG/DL — HIGH (ref 0.5–1.3)
EOSINOPHIL # BLD AUTO: 0.5 K/UL — SIGNIFICANT CHANGE UP (ref 0–0.5)
EOSINOPHIL NFR BLD AUTO: 5.8 % — SIGNIFICANT CHANGE UP (ref 0–6)
GLUCOSE SERPL-MCNC: 96 MG/DL — SIGNIFICANT CHANGE UP (ref 70–99)
HCT VFR BLD CALC: 37.3 % — SIGNIFICANT CHANGE UP (ref 34.5–45)
HGB BLD-MCNC: 11.1 G/DL — LOW (ref 11.5–15.5)
LYMPHOCYTES # BLD AUTO: 2.6 K/UL — SIGNIFICANT CHANGE UP (ref 1–3.3)
LYMPHOCYTES # BLD AUTO: 28.7 % — SIGNIFICANT CHANGE UP (ref 13–44)
MCHC RBC-ENTMCNC: 28.5 PG — SIGNIFICANT CHANGE UP (ref 27–34)
MCHC RBC-ENTMCNC: 29.7 GM/DL — LOW (ref 32–36)
MCV RBC AUTO: 96 FL — SIGNIFICANT CHANGE UP (ref 80–100)
MONOCYTES # BLD AUTO: 0.8 K/UL — SIGNIFICANT CHANGE UP (ref 0–0.9)
MONOCYTES NFR BLD AUTO: 9.4 % — SIGNIFICANT CHANGE UP (ref 2–14)
NEUTROPHILS # BLD AUTO: 4.9 K/UL — SIGNIFICANT CHANGE UP (ref 1.8–7.4)
NEUTROPHILS NFR BLD AUTO: 54.8 % — SIGNIFICANT CHANGE UP (ref 43–77)
PLATELET # BLD AUTO: 225 K/UL — SIGNIFICANT CHANGE UP (ref 150–400)
POTASSIUM SERPL-MCNC: 4.5 MMOL/L — SIGNIFICANT CHANGE UP (ref 3.5–5.3)
POTASSIUM SERPL-SCNC: 4.5 MMOL/L — SIGNIFICANT CHANGE UP (ref 3.5–5.3)
PROT SERPL-MCNC: 6.9 G/DL — SIGNIFICANT CHANGE UP (ref 6–8.3)
RBC # BLD: 3.89 M/UL — SIGNIFICANT CHANGE UP (ref 3.8–5.2)
RBC # FLD: 14.3 % — SIGNIFICANT CHANGE UP (ref 10.3–14.5)
SODIUM SERPL-SCNC: 144 MMOL/L — SIGNIFICANT CHANGE UP (ref 135–145)
WBC # BLD: 8.9 K/UL — SIGNIFICANT CHANGE UP (ref 3.8–10.5)
WBC # FLD AUTO: 8.9 K/UL — SIGNIFICANT CHANGE UP (ref 3.8–10.5)

## 2018-02-09 PROCEDURE — 80053 COMPREHEN METABOLIC PANEL: CPT

## 2018-02-09 PROCEDURE — 71046 X-RAY EXAM CHEST 2 VIEWS: CPT | Mod: 26

## 2018-02-09 PROCEDURE — 99285 EMERGENCY DEPT VISIT HI MDM: CPT

## 2018-02-09 PROCEDURE — 85027 COMPLETE CBC AUTOMATED: CPT

## 2018-02-09 PROCEDURE — 99284 EMERGENCY DEPT VISIT MOD MDM: CPT | Mod: 25

## 2018-02-09 PROCEDURE — 71046 X-RAY EXAM CHEST 2 VIEWS: CPT

## 2018-02-09 PROCEDURE — 94640 AIRWAY INHALATION TREATMENT: CPT

## 2018-02-09 RX ORDER — SODIUM CHLORIDE 9 MG/ML
500 INJECTION INTRAMUSCULAR; INTRAVENOUS; SUBCUTANEOUS ONCE
Qty: 0 | Refills: 0 | Status: DISCONTINUED | OUTPATIENT
Start: 2018-02-09 | End: 2018-02-13

## 2018-02-09 RX ORDER — ALBUTEROL 90 UG/1
2.5 AEROSOL, METERED ORAL ONCE
Qty: 0 | Refills: 0 | Status: COMPLETED | OUTPATIENT
Start: 2018-02-09 | End: 2018-02-09

## 2018-02-09 RX ORDER — IPRATROPIUM/ALBUTEROL SULFATE 18-103MCG
3 AEROSOL WITH ADAPTER (GRAM) INHALATION
Qty: 28 | Refills: 0 | OUTPATIENT
Start: 2018-02-09 | End: 2018-02-15

## 2018-02-09 RX ADMIN — ALBUTEROL 2.5 MILLIGRAM(S): 90 AEROSOL, METERED ORAL at 18:39

## 2018-02-09 NOTE — ED PROVIDER NOTE - OBJECTIVE STATEMENT
76 y/o F w/ a PMhx of Parkinson's, bipolar, COPD c/o cough x 2 days. Denies fevers and any other complaints. Allergic to codeine and Thorazine.

## 2018-07-06 ENCOUNTER — INPATIENT (INPATIENT)
Facility: HOSPITAL | Age: 78
LOS: 10 days | Discharge: NURSING, REHAB OR LONG-TERM CARE | DRG: 689 | End: 2018-07-17
Attending: INTERNAL MEDICINE | Admitting: INTERNAL MEDICINE
Payer: MEDICARE

## 2018-07-06 VITALS
HEART RATE: 105 BPM | DIASTOLIC BLOOD PRESSURE: 92 MMHG | OXYGEN SATURATION: 100 % | RESPIRATION RATE: 17 BRPM | TEMPERATURE: 98 F | WEIGHT: 130.07 LBS | SYSTOLIC BLOOD PRESSURE: 171 MMHG
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
## 2018-07-06 LAB
ALBUMIN SERPL ELPH-MCNC: 3.6 G/DL — SIGNIFICANT CHANGE UP (ref 3.5–5)
ALP SERPL-CCNC: 135 U/L — HIGH (ref 40–120)
ALT FLD-CCNC: 17 U/L DA — SIGNIFICANT CHANGE UP (ref 10–60)
ANION GAP SERPL CALC-SCNC: 7 MMOL/L — SIGNIFICANT CHANGE UP (ref 5–17)
APTT BLD: 30.3 SEC — SIGNIFICANT CHANGE UP (ref 27.5–37.4)
AST SERPL-CCNC: 12 U/L — SIGNIFICANT CHANGE UP (ref 10–40)
BASE EXCESS BLDV CALC-SCNC: 1.5 MMOL/L — SIGNIFICANT CHANGE UP (ref -2–2)
BASOPHILS # BLD AUTO: 0.1 K/UL — SIGNIFICANT CHANGE UP (ref 0–0.2)
BASOPHILS NFR BLD AUTO: 0.7 % — SIGNIFICANT CHANGE UP (ref 0–2)
BILIRUB SERPL-MCNC: 0.8 MG/DL — SIGNIFICANT CHANGE UP (ref 0.2–1.2)
BUN SERPL-MCNC: 20 MG/DL — HIGH (ref 7–18)
CALCIUM SERPL-MCNC: 11 MG/DL — HIGH (ref 8.4–10.5)
CHLORIDE SERPL-SCNC: 109 MMOL/L — HIGH (ref 96–108)
CK SERPL-CCNC: 51 U/L — SIGNIFICANT CHANGE UP (ref 21–215)
CO2 SERPL-SCNC: 26 MMOL/L — SIGNIFICANT CHANGE UP (ref 22–31)
CREAT SERPL-MCNC: 1.33 MG/DL — HIGH (ref 0.5–1.3)
EOSINOPHIL # BLD AUTO: 0.4 K/UL — SIGNIFICANT CHANGE UP (ref 0–0.5)
EOSINOPHIL NFR BLD AUTO: 3.3 % — SIGNIFICANT CHANGE UP (ref 0–6)
GLUCOSE SERPL-MCNC: 111 MG/DL — HIGH (ref 70–99)
HCO3 BLDV-SCNC: 27 MMOL/L — SIGNIFICANT CHANGE UP (ref 21–29)
HCT VFR BLD CALC: 37.4 % — SIGNIFICANT CHANGE UP (ref 34.5–45)
HGB BLD-MCNC: 11.8 G/DL — SIGNIFICANT CHANGE UP (ref 11.5–15.5)
HOROWITZ INDEX BLDV+IHG-RTO: 21 — SIGNIFICANT CHANGE UP
INR BLD: 0.96 RATIO — SIGNIFICANT CHANGE UP (ref 0.88–1.16)
LYMPHOCYTES # BLD AUTO: 1.7 K/UL — SIGNIFICANT CHANGE UP (ref 1–3.3)
LYMPHOCYTES # BLD AUTO: 13 % — SIGNIFICANT CHANGE UP (ref 13–44)
MAGNESIUM SERPL-MCNC: 2.4 MG/DL — SIGNIFICANT CHANGE UP (ref 1.6–2.6)
MCHC RBC-ENTMCNC: 29.2 PG — SIGNIFICANT CHANGE UP (ref 27–34)
MCHC RBC-ENTMCNC: 31.5 GM/DL — LOW (ref 32–36)
MCV RBC AUTO: 92.7 FL — SIGNIFICANT CHANGE UP (ref 80–100)
MONOCYTES # BLD AUTO: 0.7 K/UL — SIGNIFICANT CHANGE UP (ref 0–0.9)
MONOCYTES NFR BLD AUTO: 5.2 % — SIGNIFICANT CHANGE UP (ref 2–14)
NEUTROPHILS # BLD AUTO: 10.2 K/UL — HIGH (ref 1.8–7.4)
NEUTROPHILS NFR BLD AUTO: 77.8 % — HIGH (ref 43–77)
PCO2 BLDV: 50 MMHG — SIGNIFICANT CHANGE UP (ref 35–50)
PH BLDV: 7.35 — SIGNIFICANT CHANGE UP (ref 7.35–7.45)
PHOSPHATE SERPL-MCNC: 3.1 MG/DL — SIGNIFICANT CHANGE UP (ref 2.5–4.5)
PLATELET # BLD AUTO: 270 K/UL — SIGNIFICANT CHANGE UP (ref 150–400)
PO2 BLDV: SIGNIFICANT CHANGE UP MMHG (ref 25–45)
POTASSIUM SERPL-MCNC: 4.5 MMOL/L — SIGNIFICANT CHANGE UP (ref 3.5–5.3)
POTASSIUM SERPL-SCNC: 4.5 MMOL/L — SIGNIFICANT CHANGE UP (ref 3.5–5.3)
PROT SERPL-MCNC: 7.8 G/DL — SIGNIFICANT CHANGE UP (ref 6–8.3)
PROTHROM AB SERPL-ACNC: 10.4 SEC — SIGNIFICANT CHANGE UP (ref 9.8–12.7)
RBC # BLD: 4.04 M/UL — SIGNIFICANT CHANGE UP (ref 3.8–5.2)
RBC # FLD: 14.7 % — HIGH (ref 10.3–14.5)
SAO2 % BLDV: 29 % — LOW (ref 67–88)
SODIUM SERPL-SCNC: 142 MMOL/L — SIGNIFICANT CHANGE UP (ref 135–145)
TROPONIN I SERPL-MCNC: <0.015 NG/ML — SIGNIFICANT CHANGE UP (ref 0–0.04)
WBC # BLD: 13.2 K/UL — HIGH (ref 3.8–10.5)
WBC # FLD AUTO: 13.2 K/UL — HIGH (ref 3.8–10.5)

## 2018-07-06 PROCEDURE — 73562 X-RAY EXAM OF KNEE 3: CPT | Mod: 26,RT

## 2018-07-06 PROCEDURE — 73552 X-RAY EXAM OF FEMUR 2/>: CPT | Mod: 26,RT

## 2018-07-06 PROCEDURE — 99285 EMERGENCY DEPT VISIT HI MDM: CPT

## 2018-07-06 PROCEDURE — 70450 CT HEAD/BRAIN W/O DYE: CPT | Mod: 26

## 2018-07-06 PROCEDURE — 71045 X-RAY EXAM CHEST 1 VIEW: CPT | Mod: 26

## 2018-07-06 PROCEDURE — 72125 CT NECK SPINE W/O DYE: CPT | Mod: 26

## 2018-07-06 PROCEDURE — 73502 X-RAY EXAM HIP UNI 2-3 VIEWS: CPT | Mod: 26,RT

## 2018-07-06 RX ORDER — SODIUM CHLORIDE 9 MG/ML
500 INJECTION INTRAMUSCULAR; INTRAVENOUS; SUBCUTANEOUS ONCE
Qty: 0 | Refills: 0 | Status: COMPLETED | OUTPATIENT
Start: 2018-07-06 | End: 2018-07-06

## 2018-07-06 RX ORDER — IPRATROPIUM/ALBUTEROL SULFATE 18-103MCG
3 AEROSOL WITH ADAPTER (GRAM) INHALATION ONCE
Qty: 0 | Refills: 0 | Status: COMPLETED | OUTPATIENT
Start: 2018-07-06 | End: 2018-07-06

## 2018-07-06 RX ORDER — ACETAMINOPHEN 500 MG
650 TABLET ORAL ONCE
Qty: 0 | Refills: 0 | Status: COMPLETED | OUTPATIENT
Start: 2018-07-06 | End: 2018-07-06

## 2018-07-06 RX ORDER — TETANUS TOXOID, REDUCED DIPHTHERIA TOXOID AND ACELLULAR PERTUSSIS VACCINE, ADSORBED 5; 2.5; 8; 8; 2.5 [IU]/.5ML; [IU]/.5ML; UG/.5ML; UG/.5ML; UG/.5ML
0.5 SUSPENSION INTRAMUSCULAR ONCE
Qty: 0 | Refills: 0 | Status: COMPLETED | OUTPATIENT
Start: 2018-07-06 | End: 2018-07-06

## 2018-07-06 RX ORDER — SODIUM CHLORIDE 9 MG/ML
1000 INJECTION INTRAMUSCULAR; INTRAVENOUS; SUBCUTANEOUS
Qty: 0 | Refills: 0 | Status: DISCONTINUED | OUTPATIENT
Start: 2018-07-06 | End: 2018-07-07

## 2018-07-06 RX ORDER — CEFTRIAXONE 500 MG/1
1 INJECTION, POWDER, FOR SOLUTION INTRAMUSCULAR; INTRAVENOUS ONCE
Qty: 0 | Refills: 0 | Status: COMPLETED | OUTPATIENT
Start: 2018-07-06 | End: 2018-07-06

## 2018-07-06 RX ADMIN — Medication 3 MILLILITER(S): at 13:33

## 2018-07-06 RX ADMIN — Medication 650 MILLIGRAM(S): at 13:34

## 2018-07-06 RX ADMIN — Medication 650 MILLIGRAM(S): at 14:04

## 2018-07-06 RX ADMIN — SODIUM CHLORIDE 50 MILLILITER(S): 9 INJECTION INTRAMUSCULAR; INTRAVENOUS; SUBCUTANEOUS at 22:50

## 2018-07-06 RX ADMIN — CEFTRIAXONE 100 GRAM(S): 500 INJECTION, POWDER, FOR SOLUTION INTRAMUSCULAR; INTRAVENOUS at 17:16

## 2018-07-06 RX ADMIN — SODIUM CHLORIDE 500 MILLILITER(S): 9 INJECTION INTRAMUSCULAR; INTRAVENOUS; SUBCUTANEOUS at 17:16

## 2018-07-06 RX ADMIN — TETANUS TOXOID, REDUCED DIPHTHERIA TOXOID AND ACELLULAR PERTUSSIS VACCINE, ADSORBED 0.5 MILLILITER(S): 5; 2.5; 8; 8; 2.5 SUSPENSION INTRAMUSCULAR at 13:33

## 2018-07-06 NOTE — ED ADULT NURSE REASSESSMENT NOTE - NS ED NURSE REASSESS COMMENT FT1
Received pt from KUSHAL Paz, pt is observed laying in bed, breathing room air, in no distress at time of assessment. Pt is A&O x3, able to make needs known. Skin is intact, left AC 20Ga. No meds pending to be given at this time. Pt is admitted to [facility], report given to KUSHAL Cook, awaiting admitting orders and transport. Nursing monitoring continues.
pt is incontinent pt was changes pt given the breathing treatment  .

## 2018-07-06 NOTE — ED PROVIDER NOTE - PHYSICAL EXAMINATION
Physical Exam:   GEN: elderly F who is in no acute distress, AAOx2 (not to time)  HENT: + trauma to the L forehead w/ 3 cm laceration, MMM, no stridor  EYES: PERRLA, EOMI  RESP: CTAB, no respiratory distress  CV: + tachycardia and regular rhythm, S1 S2  ABD: soft and NTND  EXT: No edema, No bony deformity of extremities  MSK: + swelling to the R knee w/ decreased ROM of the R hip  SKIN: + laceration to forehead, skin color normal for race  NEURO: CN grossly intact, No focal motor or sensory deficits.   ~ Kyler Jenkins MD Physical Exam:   GEN: elderly F who is in no acute distress, AAOx2 (not to time)  HENT: + trauma to the L forehead w/ 3 cm laceration, MMM, no stridor  EYES: PERRLA, EOMI  RESP: CTAB, no respiratory distress  CV: + tachycardia and regular rhythm, S1 S2  ABD: soft and NTND  EXT: No edema, No bony deformity of extremities  MSK: + swelling to the R knee w/ decreased ROM of the R hip  SKIN: + laceration to forehead 3 cm, skin color normal for race  NEURO: CN grossly intact, No focal motor or sensory deficits.   ~ Kyler Jenkins MD

## 2018-07-06 NOTE — H&P ADULT - NEUROLOGICAL DETAILS
deep reflexes intact/sensation intact/superficial reflexes intact/normal strength/alert and oriented x 3

## 2018-07-06 NOTE — ED PROVIDER NOTE - MEDICAL DECISION MAKING DETAILS
Kyler Jenkins MD: unwitnessed fall w/ injury to the L forehead and the R knee, pt unable to get up on their own, and no help at home. poor historian w/ unclear history of the fall. AAOx2. not a safe discharge as pt w/ recent fall, and is fall risk. will screen for metabolic and arrhythmogenic cause for her fall. unknown last Tdap. pt w/ lac that will be amenable to adhesive repair.

## 2018-07-06 NOTE — H&P ADULT - ASSESSMENT
78Y f, who walks with walker, with PMHx of COPD, Bipolar disorder, Parkinsonism, was brought to ER by EMS for unwitnessed fall in morning (07-).

## 2018-07-06 NOTE — H&P ADULT - PROBLEM SELECTOR PLAN 8
IMPROVE VTE Individual Risk Assessment  RISK                                                          Points  [] Previous VTE                                           3  [] Thrombophilia                                        2  [] Lower limb paralysis                              2   [] Current Cancer                                       2   [x] Immobilization > 24 hrs                        1  [] ICU/CCU stay > 24 hours                       1  [x] Age > 60                                                   1  IMPROVE VTE Score = 2  X for DVT chemoprophylaxis  X for GI chemoprophylaxis

## 2018-07-06 NOTE — ED PROVIDER NOTE - CARE PLAN
Principal Discharge DX:	Fall, initial encounter  Secondary Diagnosis:	Ambulatory dysfunction  Secondary Diagnosis:	UTI (urinary tract infection)

## 2018-07-06 NOTE — H&P ADULT - PROBLEM SELECTOR PROBLEM 7
CT OF THE ABDOMEN AND PELVIS WITHOUT CONTRAST

6/6/17

 

A noncontrast CT was done for evaluation of flank pain. There is a history of renal calculi. Axial s
lices were acquired, then coronal reconstructions were done. 

 

There is a 4 mm distal right ureteral calculus at the right UVJ. It is causing mild to moderate righ
t hydronephrosis. A small nonobstructing stone is seen in the right kidney as well. There is at leas
t one tiny nonobstructing stone in the left kidney. 

 

The lung bases are clear. the liver, spleen, pancreas, adrenal glands, aorta and gallbladder all wer
e unremarkable within the limitations of a noncontrast study. 

 

The bowel is nondistended with no sign of obstruction. There is no sign of appendicitis or diverticu
litis. No free air or free fluid was seen. 

 

CT of the pelvis shows no pelvic masses, fluid collections or inflammatory changes. 

 

IMPRESSION:  

1.      4 mm distal right ureteral calculus at the UVJ causing mild to moderate right hydronephrosis
.

2.      Tiny nonobstructing renal calculi bilaterally, more noticeable on the right than left. 

 

POS: HOME
Chronic obstructive pulmonary disease, unspecified COPD type

## 2018-07-06 NOTE — H&P ADULT - PROBLEM SELECTOR PLAN 1
Unwitnessed fall.  Pain right knee. CT spine and head and X rays spine, chest and limb negative for fracture.  will obtain ortho consult.  laceration 4cm just above left eye lid.  ordered orthostatic vitals.  workup for syncope.  workup for Unwitnessed fall.  Pain right knee. CT spine and head and X rays spine, chest and limb negative for fracture.  will obtain ortho consult.  laceration 4cm just above left eye lid.  ordered orthostatic vitals.  workup for syncope. ordered TTE and Carotid doppler. Unwitnessed fall Possibly mechanical.  Pain right knee. CT spine and head and X rays spine, chest and limb negative for fracture.  will obtain ortho consult.  laceration 4cm just above left eye lid.  ordered orthostatic vitals.  Vit b12 and vit d level ordered.  PT evaluation ordered.  workup for syncope. ordered TTE and Carotid doppler.(as per attending notes)

## 2018-07-06 NOTE — H&P ADULT - NSHPPHYSICALEXAM_GEN_ALL_CORE
Vital Signs Last 24 Hrs  T(C): 36.8 (06 Jul 2018 22:46), Max: 36.8 (06 Jul 2018 10:46)  T(F): 98.2 (06 Jul 2018 22:46), Max: 98.3 (06 Jul 2018 10:46)  HR: 76 (06 Jul 2018 22:46) (59 - 105)  BP: 139/89 (06 Jul 2018 22:46) (139/89 - 171/92)  BP(mean): --  RR: 17 (06 Jul 2018 22:46) (17 - 18)  SpO2: 95% (06 Jul 2018 22:46) (95% - 100%)

## 2018-07-06 NOTE — H&P ADULT - PROBLEM SELECTOR PLAN 4
hand tremors noted. no stiffness.  on Trihexyphenydyl. hand tremors noted. no stiffness.  on Trihexyphenidyl.

## 2018-07-06 NOTE — ED PROCEDURE NOTE - PROCEDURE ADDITIONAL DETAILS
During procedure, used Histoacryl, care not to allow solution to drip into her eye. Dried and applied steristrips on top.

## 2018-07-06 NOTE — H&P ADULT - PROBLEM SELECTOR PLAN 2
UA shows moderate leukocytes esterase concentration,26-50 WBC's, trace blood and few bacteria.  WBC count of 13.1. UA shows moderate leukocytes esterase concentration,26-50 WBC's, trace blood and few bacteria.  WBC count of 13.1.  Ceftriaxone 1G once 24 hour started. UA shows moderate leukocytes esterase concentration,26-50 WBC's, trace blood and few bacteria.  WBC count of 13.1.  Ceftriaxone 1G q 24 hour started.  Followup with urine culture.

## 2018-07-06 NOTE — PATIENT PROFILE ADULT. - FUNCTIONAL SCREEN CURRENT LEVEL: COMMUNICATION, MLM
(0) understands/communicates without difficulty (2) difficulty speaking (not related to language barrier)/speech sometimes unclear

## 2018-07-06 NOTE — H&P ADULT - NSHPSOCIALHISTORY_GEN_ALL_CORE
Former smoker. no alcohol. no recreational drugs, Former smoker. no alcohol. no recreational drugs, stay at home

## 2018-07-06 NOTE — H&P ADULT - HISTORY OF PRESENT ILLNESS
78Y f, who walks with walker, with PMHx of COPD, Bipolar disorder, Parkinsonism, was brought to ER by EMS for unwitnessed fall in morning (07-). Pt was trying to  her phone when she tripped and experienced a forward fall in which she hit her L side of face. Pt did not experience any light handedness dizziness, vertigo, head spining nausea, vomiting, cp, palpitation or loss of consciousness.  Pt had laceration 4cm laceration just above left eyelid which was closed by sterie tape. She complain 78Y f, who walks with walker, with PMHx of COPD, Bipolar disorder, Parkinsonism, was brought to ER by EMS for unwitnessed fall in morning (07-). Pt was trying to  her phone when she tripped and experienced a forward fall in which she hit her L side of face. Pt did not experience any light handedness dizziness, vertigo, head spining nausea, vomiting, cp, palpitation or loss of consciousness.  Pt had laceration 4cm laceration just above left eyelid which was closed by steri tape. She complain of right knee pain which is constant and 10/10 in intensity and aggravates the movement according to her. pt also complains of R sided abdominal pain which is 3/10 in intensity.  She had a fall last week and twice she fell from her bed since last week. 78Y f, who walks with walker, with PMHx of COPD, Bipolar disorder, Parkinsonism, was brought to ER by EMS for unwitnessed fall in morning (07-). Pt was trying to  her phone when she tripped and experienced a forward fall in which she hit her L side of face. Pt did not experience any light handedness dizziness, vertigo, head spining nausea, vomiting, cp, palpitation or loss of consciousness.  Pt had laceration 4cm laceration just above left eyelid which was closed by steri tape. She complain of right knee pain which is constant and 10/10 in intensity and aggravates the movement according to her. pt also complains of R sided abdominal pain which is 3/10 in intensity.  She had a fall last week and twice she fell from her bed since last week. She does not have any 78Y f, who walks with walker, with PMHx of COPD, Bipolar disorder, Parkinsonism, was brought to ER by EMS for unwitnessed fall in morning (07-). Pt was trying to  her phone when she tripped and experienced a forward fall in which she hit her L side of face. Pt did not experience any light handedness dizziness, vertigo, head spining nausea, vomiting, cp, palpitation or loss of consciousness.  Pt had laceration 4cm laceration just above left eyelid which was closed by steri tape. She complain of right knee pain which is constant and 10/10 in intensity and aggravates the movement according to her. pt also complains of R sided abdominal pain which is 3/10 in intensity.  She had a fall last week and twice she fell from her bed since last week. She lives alone and does not have any help or assistance at home.

## 2018-07-06 NOTE — ED ADULT NURSE NOTE - OBJECTIVE STATEMENT
RN SUSAN DERICK COVERING NOTES: Patient reports she was trying to reach the phone and fell. Patient sustained laceration on the forehead. Denies any LOC

## 2018-07-06 NOTE — ED PROVIDER NOTE - OBJECTIVE STATEMENT
Kyler Jenkins MD: 78 F w/ Hx of COPD, Bipolar d/o, Parkinson's, who was BIBEMS for unwitnessed fall this morning. Pt reports that she dropped the phone, and fell when she was picking up the phone. She fell forward and hit her L side of her face. Also injured her R knee. Pt was unable to get up on her own. Wallks w/ walker, no help or assistance at home. Fell another time this same week. No antiplatelets or A/C

## 2018-07-06 NOTE — H&P ADULT - ATTENDING COMMENTS
Seen and examined . Admitted with Unwitnessed fall . Her main concern is Rt knee pain. CT scans and xrays noted. No fractures . Will work up for Syncope and get Orhtho consult for Rt knee . Will check Orthostasis, TTE and carotid doppler.

## 2018-07-07 DIAGNOSIS — F31.9 BIPOLAR DISORDER, UNSPECIFIED: ICD-10-CM

## 2018-07-07 DIAGNOSIS — G20 PARKINSON'S DISEASE: ICD-10-CM

## 2018-07-07 DIAGNOSIS — N39.0 URINARY TRACT INFECTION, SITE NOT SPECIFIED: ICD-10-CM

## 2018-07-07 DIAGNOSIS — Z29.9 ENCOUNTER FOR PROPHYLACTIC MEASURES, UNSPECIFIED: ICD-10-CM
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
## 2018-07-07 DIAGNOSIS — E78.5 HYPERLIPIDEMIA, UNSPECIFIED: ICD-10-CM

## 2018-07-07 DIAGNOSIS — J44.9 CHRONIC OBSTRUCTIVE PULMONARY DISEASE, UNSPECIFIED: ICD-10-CM

## 2018-07-07 DIAGNOSIS — R26.2 DIFFICULTY IN WALKING, NOT ELSEWHERE CLASSIFIED: ICD-10-CM

## 2018-07-07 LAB
BASOPHILS # BLD AUTO: 0.1 K/UL — SIGNIFICANT CHANGE UP (ref 0–0.2)
BASOPHILS NFR BLD AUTO: 0.6 % — SIGNIFICANT CHANGE UP (ref 0–2)
EOSINOPHIL # BLD AUTO: 0.2 K/UL — SIGNIFICANT CHANGE UP (ref 0–0.5)
EOSINOPHIL NFR BLD AUTO: 1.6 % — SIGNIFICANT CHANGE UP (ref 0–6)
HBA1C BLD-MCNC: 5.1 % — SIGNIFICANT CHANGE UP (ref 4–5.6)
HCT VFR BLD CALC: 33.4 % — LOW (ref 34.5–45)
HGB BLD-MCNC: 10.3 G/DL — LOW (ref 11.5–15.5)
LYMPHOCYTES # BLD AUTO: 1.2 K/UL — SIGNIFICANT CHANGE UP (ref 1–3.3)
LYMPHOCYTES # BLD AUTO: 10.2 % — LOW (ref 13–44)
MCHC RBC-ENTMCNC: 28.5 PG — SIGNIFICANT CHANGE UP (ref 27–34)
MCHC RBC-ENTMCNC: 30.8 GM/DL — LOW (ref 32–36)
MCV RBC AUTO: 92.3 FL — SIGNIFICANT CHANGE UP (ref 80–100)
MONOCYTES # BLD AUTO: 0.3 K/UL — SIGNIFICANT CHANGE UP (ref 0–0.9)
MONOCYTES NFR BLD AUTO: 2.8 % — SIGNIFICANT CHANGE UP (ref 2–14)
NEUTROPHILS # BLD AUTO: 9.7 K/UL — HIGH (ref 1.8–7.4)
NEUTROPHILS NFR BLD AUTO: 84.7 % — HIGH (ref 43–77)
PLATELET # BLD AUTO: 269 K/UL — SIGNIFICANT CHANGE UP (ref 150–400)
PROCALCITONIN SERPL-MCNC: 0.31 NG/ML — HIGH (ref 0.02–0.1)
RBC # BLD: 3.62 M/UL — LOW (ref 3.8–5.2)
RBC # FLD: 14 % — SIGNIFICANT CHANGE UP (ref 10.3–14.5)
TSH SERPL-MCNC: 0.74 UU/ML — SIGNIFICANT CHANGE UP (ref 0.34–4.82)
VIT B12 SERPL-MCNC: 568 PG/ML — SIGNIFICANT CHANGE UP (ref 232–1245)
WBC # BLD: 11.5 K/UL — HIGH (ref 3.8–10.5)
WBC # FLD AUTO: 11.5 K/UL — HIGH (ref 3.8–10.5)

## 2018-07-07 PROCEDURE — 99222 1ST HOSP IP/OBS MODERATE 55: CPT

## 2018-07-07 PROCEDURE — 93880 EXTRACRANIAL BILAT STUDY: CPT | Mod: 26

## 2018-07-07 PROCEDURE — 73700 CT LOWER EXTREMITY W/O DYE: CPT | Mod: 26,RT

## 2018-07-07 RX ORDER — LITHIUM CARBONATE 300 MG/1
300 TABLET, EXTENDED RELEASE ORAL
Qty: 0 | Refills: 0 | Status: DISCONTINUED | OUTPATIENT
Start: 2018-07-07 | End: 2018-07-10

## 2018-07-07 RX ORDER — PREGABALIN 225 MG/1
1000 CAPSULE ORAL DAILY
Qty: 0 | Refills: 0 | Status: DISCONTINUED | OUTPATIENT
Start: 2018-07-07 | End: 2018-07-07

## 2018-07-07 RX ORDER — ANASTROZOLE 1 MG/1
1 TABLET ORAL DAILY
Qty: 0 | Refills: 0 | Status: DISCONTINUED | OUTPATIENT
Start: 2018-07-07 | End: 2018-07-17

## 2018-07-07 RX ORDER — ENOXAPARIN SODIUM 100 MG/ML
40 INJECTION SUBCUTANEOUS EVERY 24 HOURS
Qty: 0 | Refills: 0 | Status: DISCONTINUED | OUTPATIENT
Start: 2018-07-07 | End: 2018-07-07

## 2018-07-07 RX ORDER — IPRATROPIUM/ALBUTEROL SULFATE 18-103MCG
3 AEROSOL WITH ADAPTER (GRAM) INHALATION
Qty: 120 | Refills: 0 | OUTPATIENT
Start: 2018-07-07 | End: 2018-08-05

## 2018-07-07 RX ORDER — IPRATROPIUM/ALBUTEROL SULFATE 18-103MCG
3 AEROSOL WITH ADAPTER (GRAM) INHALATION EVERY 6 HOURS
Qty: 0 | Refills: 0 | Status: DISCONTINUED | OUTPATIENT
Start: 2018-07-07 | End: 2018-07-17

## 2018-07-07 RX ORDER — LORATADINE 10 MG/1
10 TABLET ORAL DAILY
Qty: 0 | Refills: 0 | Status: DISCONTINUED | OUTPATIENT
Start: 2018-07-07 | End: 2018-07-17

## 2018-07-07 RX ORDER — SIMVASTATIN 20 MG/1
40 TABLET, FILM COATED ORAL AT BEDTIME
Qty: 0 | Refills: 0 | Status: DISCONTINUED | OUTPATIENT
Start: 2018-07-07 | End: 2018-07-17

## 2018-07-07 RX ORDER — HEPARIN SODIUM 5000 [USP'U]/ML
5000 INJECTION INTRAVENOUS; SUBCUTANEOUS EVERY 8 HOURS
Qty: 0 | Refills: 0 | Status: DISCONTINUED | OUTPATIENT
Start: 2018-07-07 | End: 2018-07-07

## 2018-07-07 RX ORDER — ALBUTEROL 90 UG/1
2 AEROSOL, METERED ORAL EVERY 6 HOURS
Qty: 0 | Refills: 0 | Status: DISCONTINUED | OUTPATIENT
Start: 2018-07-07 | End: 2018-07-07

## 2018-07-07 RX ORDER — PREGABALIN 225 MG/1
100 CAPSULE ORAL ONCE
Qty: 0 | Refills: 0 | Status: DISCONTINUED | OUTPATIENT
Start: 2018-07-07 | End: 2018-07-07

## 2018-07-07 RX ORDER — RISPERIDONE 4 MG/1
1 TABLET ORAL DAILY
Qty: 0 | Refills: 0 | Status: DISCONTINUED | OUTPATIENT
Start: 2018-07-07 | End: 2018-07-13

## 2018-07-07 RX ORDER — HEPARIN SODIUM 5000 [USP'U]/ML
5000 INJECTION INTRAVENOUS; SUBCUTANEOUS EVERY 12 HOURS
Qty: 0 | Refills: 0 | Status: DISCONTINUED | OUTPATIENT
Start: 2018-07-07 | End: 2018-07-17

## 2018-07-07 RX ORDER — TRIHEXYPHENIDYL HCL 2 MG
1 TABLET ORAL THREE TIMES A DAY
Qty: 0 | Refills: 0 | Status: DISCONTINUED | OUTPATIENT
Start: 2018-07-07 | End: 2018-07-17

## 2018-07-07 RX ORDER — BETHANECHOL CHLORIDE 25 MG
25 TABLET ORAL
Qty: 0 | Refills: 0 | Status: DISCONTINUED | OUTPATIENT
Start: 2018-07-07 | End: 2018-07-17

## 2018-07-07 RX ORDER — CEFTRIAXONE 500 MG/1
1 INJECTION, POWDER, FOR SOLUTION INTRAMUSCULAR; INTRAVENOUS EVERY 24 HOURS
Qty: 0 | Refills: 0 | Status: DISCONTINUED | OUTPATIENT
Start: 2018-07-07 | End: 2018-07-13

## 2018-07-07 RX ORDER — ACETAMINOPHEN 500 MG
650 TABLET ORAL EVERY 6 HOURS
Qty: 0 | Refills: 0 | Status: DISCONTINUED | OUTPATIENT
Start: 2018-07-07 | End: 2018-07-17

## 2018-07-07 RX ORDER — TRIHEXYPHENIDYL HCL 2 MG
2 TABLET ORAL THREE TIMES A DAY
Qty: 0 | Refills: 0 | Status: DISCONTINUED | OUTPATIENT
Start: 2018-07-07 | End: 2018-07-07

## 2018-07-07 RX ORDER — PANTOPRAZOLE SODIUM 20 MG/1
40 TABLET, DELAYED RELEASE ORAL
Qty: 0 | Refills: 0 | Status: DISCONTINUED | OUTPATIENT
Start: 2018-07-07 | End: 2018-07-17

## 2018-07-07 RX ADMIN — HEPARIN SODIUM 5000 UNIT(S): 5000 INJECTION INTRAVENOUS; SUBCUTANEOUS at 05:26

## 2018-07-07 RX ADMIN — LITHIUM CARBONATE 300 MILLIGRAM(S): 300 TABLET, EXTENDED RELEASE ORAL at 05:26

## 2018-07-07 RX ADMIN — Medication 1 MILLIGRAM(S): at 13:47

## 2018-07-07 RX ADMIN — Medication 1 MILLIGRAM(S): at 22:06

## 2018-07-07 RX ADMIN — Medication 3 MILLILITER(S): at 20:28

## 2018-07-07 RX ADMIN — LITHIUM CARBONATE 300 MILLIGRAM(S): 300 TABLET, EXTENDED RELEASE ORAL at 17:39

## 2018-07-07 RX ADMIN — Medication 1 MILLIGRAM(S): at 06:25

## 2018-07-07 RX ADMIN — CEFTRIAXONE 100 GRAM(S): 500 INJECTION, POWDER, FOR SOLUTION INTRAMUSCULAR; INTRAVENOUS at 17:38

## 2018-07-07 RX ADMIN — SIMVASTATIN 40 MILLIGRAM(S): 20 TABLET, FILM COATED ORAL at 22:07

## 2018-07-07 RX ADMIN — PANTOPRAZOLE SODIUM 40 MILLIGRAM(S): 20 TABLET, DELAYED RELEASE ORAL at 06:25

## 2018-07-07 RX ADMIN — Medication 25 MILLIGRAM(S): at 05:26

## 2018-07-07 RX ADMIN — Medication 25 MILLIGRAM(S): at 17:38

## 2018-07-07 RX ADMIN — LORATADINE 10 MILLIGRAM(S): 10 TABLET ORAL at 11:44

## 2018-07-07 RX ADMIN — ANASTROZOLE 1 MILLIGRAM(S): 1 TABLET ORAL at 11:45

## 2018-07-07 RX ADMIN — Medication 50 MILLIGRAM(S): at 05:26

## 2018-07-07 RX ADMIN — RISPERIDONE 1 MILLIGRAM(S): 4 TABLET ORAL at 11:44

## 2018-07-07 RX ADMIN — HEPARIN SODIUM 5000 UNIT(S): 5000 INJECTION INTRAVENOUS; SUBCUTANEOUS at 17:38

## 2018-07-07 NOTE — CONSULT NOTE ADULT - SUBJECTIVE AND OBJECTIVE BOX
Pt Name: NINA CHRISTINE  MRN: [id number]    ORTHOPEDIC CONSULT:    Orthopedic diagnosis:    78yFemaleHPI:  78Y f, who walks with walker, with PMHx of COPD, Bipolar disorder, Parkinsonism, was brought to ER by EMS for unwitnessed fall in morning (07-). Pt was trying to  her phone when she tripped and experienced a forward fall in which she hit her L side of face. Pt did not experience any light handedness dizziness, vertigo, head spining nausea, vomiting, cp, palpitation or loss of consciousness.  Pt had laceration 4cm laceration just above left eyelid which was closed by steri tape. She complain of right knee pain which is constant and 10/10 in intensity and aggravates the movement according to her. pt also complains of R sided abdominal pain which is 3/10 in intensity.  She had a fall last week and twice she fell from her bed since last week. She lives alone and does not have any help or assistance at home. (06 Jul 2018 23:29)    Patient reports resolved knee pain. able to move without pain      AMBULATION: Baseline Ambulation [ ] independent [X] With Cane [ ] With Walker [ ]  Bedbound [ ] Pivot transfers to Wheelchair only    PAST MEDICAL & SURGICAL HISTORY:  HLD (hyperlipidemia)  Parkinson's disease  Bipolar 1 disorder  COPD (chronic obstructive pulmonary disease)  No significant past surgical history      ALLERGIES: codeine (Unknown)  Thorazine (Unknown)      MEDICATIONS: acetaminophen   Tablet. 650 milliGRAM(s) Oral every 6 hours PRN  ALBUTerol/ipratropium for Nebulization 3 milliLiter(s) Nebulizer every 6 hours  anastrozole 1 milliGRAM(s) Oral daily  bethanechol 25 milliGRAM(s) Oral two times a day  cefTRIAXone   IVPB 1 Gram(s) IV Intermittent every 24 hours  Cyanocobalamin 100 MICROGram(s) 100 MICROGram(s) Oral daily  heparin  Injectable 5000 Unit(s) SubCutaneous every 12 hours  lithium SR (LITHOBID) 300 milliGRAM(s) Oral two times a day  loratadine 10 milliGRAM(s) Oral daily  pantoprazole    Tablet 40 milliGRAM(s) Oral before breakfast  predniSONE   Tablet 50 milliGRAM(s) Oral daily  risperiDONE   Tablet 1 milliGRAM(s) Oral daily  simvastatin 40 milliGRAM(s) Oral at bedtime  trihexyphenidyl 1 milliGRAM(s) Oral three times a day      PHYSICAL EXAM:    Vital Signs Last 24 Hrs  T(C): 36.4 (07 Jul 2018 20:59), Max: 36.8 (07 Jul 2018 14:40)  T(F): 97.6 (07 Jul 2018 20:59), Max: 98.3 (07 Jul 2018 14:40)  HR: 67 (07 Jul 2018 20:59) (53 - 67)  BP: 118/55 (07 Jul 2018 20:59) (118/53 - 153/64)  BP(mean): --  RR: 14 (07 Jul 2018 20:59) (14 - 18)  SpO2: 98% (07 Jul 2018 20:59) (97% - 100%)    Gen: well developed, well nourished, comfortable  Rectal: deferred  Extremities: no clubbing/cyanosis, no edema, no calf tenderness  Vascular:  DP/PT 2+ b/l  Neurological: no focal deficits  Skin: no rash on visible skin  Musculoskeletal:    RLE  skin intact  no erythema induration warmth  minimal effusion  no tenderness to palpation over the entire knee  knee gentle ROM 0-90 painless  varus valgus stable  negative lachman  flex.extend knee ankle toes  sensation intact distally to lgiht touch  palp DP      LABS:                        10.3   11.5  )-----------( 269      ( 07 Jul 2018 08:02 )             33.4     07-06    142  |  109<H>  |  20<H>  ----------------------------<  111<H>  4.5   |  26  |  1.33<H>    Ca    11.0<H>      06 Jul 2018 12:08  Phos  3.1     07-06  Mg     2.4     07-06    TPro  7.8  /  Alb  3.6  /  TBili  0.8  /  DBili  x   /  AST  12  /  ALT  17  /  AlkPhos  135<H>  07-06    PT/INR - ( 06 Jul 2018 12:08 )   PT: 10.4 sec;   INR: 0.96 ratio         PTT - ( 06 Jul 2018 12:08 )  PTT:30.3 sec    RADIOLOGY: severe knee oa with loose body on XR, no fracture  CT scan. no fx, severe OA with loose body posteriorly    IMPRESSION: Pt is a  78y Female with right knee contusion w exacerbation knee OA    PLAN:  -  Pain management  -  DVT prophylaxis  -  Daily PT  -  WBAT RLE  -  ice/elevate  -  compressive wrap  -  no orthopedic intervention as painless  -  reconsult prn

## 2018-07-07 NOTE — CONSULT NOTE ADULT - SUBJECTIVE AND OBJECTIVE BOX
CARDIOLOGY ATTENDING      HISTORY OF PRESENT ILLNESS: 77 y/o female PMH COPD and Parkinsons admitted with recurrent falls. She denies true loss of consciousness EKG is unremarkable and echo in Jan 2017 unremarkable. She denies palpitations nor angina.      PAST MEDICAL & SURGICAL HISTORY:  HLD (hyperlipidemia)  Parkinson's disease  Bipolar 1 disorder  COPD (chronic obstructive pulmonary disease)    No significant past surgical history      MEDICATIONS  (STANDING):  ALBUTerol/ipratropium for Nebulization 3 milliLiter(s) Nebulizer every 6 hours  anastrozole 1 milliGRAM(s) Oral daily  bethanechol 25 milliGRAM(s) Oral two times a day  cefTRIAXone   IVPB 1 Gram(s) IV Intermittent every 24 hours  Cyanocobalamin 100 MICROGram(s) 100 MICROGram(s) Oral daily  heparin  Injectable 5000 Unit(s) SubCutaneous every 12 hours  lithium SR (LITHOBID) 300 milliGRAM(s) Oral two times a day  loratadine 10 milliGRAM(s) Oral daily  pantoprazole    Tablet 40 milliGRAM(s) Oral before breakfast  predniSONE   Tablet 50 milliGRAM(s) Oral daily  risperiDONE   Tablet 1 milliGRAM(s) Oral daily  simvastatin 40 milliGRAM(s) Oral at bedtime  trihexyphenidyl 1 milliGRAM(s) Oral three times a day      Allergies    codeine (Unknown)  Thorazine (Unknown)    Intolerances      FAMILY HISTORY:  No pertinent family history in first degree relatives  Non-contributary for premature coronary disease or sudden cardiac death    SOCIAL HISTORY:    [x ] Non-smoker  [ ] Smoker  [ ] Alcohol      REVIEW OF SYSTEMS:  [ ]chest pain  [  ]shortness of breath  [  ]palpitations  [  ]syncope  [ ]near syncope [ ]upper extremity weakness   [ ] lower extremity weakness  [  ]diplopia  [  ]altered mental status   [  ]fevers  [ ]chills [ ]nausea  [ ]vomitting  [  ]dysphagia    [ ]abdominal pain  [ ]melena  [ ]BRBPR    [  ]epistaxis  [  ]rash    [ ]lower extremity edema        [x ] All others negative	  [ ] Unable to obtain    PHYSICAL EXAM:  T(C): 36.6 (07-07-18 @ 05:08), Max: 36.8 (07-06-18 @ 15:37)  HR: 53 (07-07-18 @ 05:08) (53 - 78)  BP: 153/64 (07-07-18 @ 05:08) (139/89 - 159/108)  RR: 18 (07-07-18 @ 05:08) (17 - 18)  SpO2: 97% (07-07-18 @ 05:08) (95% - 98%)  Wt(kg): --    Appearance: Normal	  HEENT:   Normal oral mucosa, PERRL, EOMI	  Lymphatic: No lymphadenopathy , no edema  Cardiovascular: Normal S1 S2, No JVD, No murmurs , Peripheral pulses palpable 2+ bilaterally  Respiratory: Lungs clear to auscultation, normal effort 	  Gastrointestinal:  Soft, Non-tender, + BS	  Skin: No rashes, No ecchymoses, No cyanosis, warm to touch  Musculoskeletal: Normal range of motion, normal strength  Psychiatry:  Mood & affect appropriate      TELEMETRY: 	  n/a  ECG:  	NSR    Echo: Jan 2017 normal  NST: n/a  Cath: n/a  	  	  LABS:	 	                          10.3   11.5  )-----------( 269      ( 07 Jul 2018 08:02 )             33.4     07-06    142  |  109<H>  |  20<H>  ----------------------------<  111<H>  4.5   |  26  |  1.33<H>    Ca    11.0<H>      06 Jul 2018 12:08  Phos  3.1     07-06  Mg     2.4     07-06    TPro  7.8  /  Alb  3.6  /  TBili  0.8  /  DBili  x   /  AST  12  /  ALT  17  /  AlkPhos  135<H>  07-06    proBNP:   Lipid Profile:   HgA1c: Hemoglobin A1C, Whole Blood: 5.1 % (07-07 @ 09:35)    TSH: Thyroid Stimulating Hormone, Serum: 0.74 uU/mL (07-07 @ 08:02)      A/P)  77 y/o female PMH COPD and Parkinsons admitted with recurrent falls. She denies true loss of consciousness EKG is unremarkable and echo in Jan 2017 unremarkable. She denies palpitations nor angina.    -given normal EKG her falls are unlikely from a malignant bradyarrhythmia  -given normal echo in Jan 2017 her falls are unlikely from a malignant ventricular arrhythmia  -can repeat echo if patient is admitted, but would recommend outpatient event monitoring if repeat echo remains normal      Hedy Us M.D., [facility] [[email or web address]]  Cardiac Electrophysiology  [facility]  [address] [place] [address], [id number]  [place] [address]  [email or web address]    office [phone]  pager [phone]

## 2018-07-07 NOTE — PROGRESS NOTE ADULT - ASSESSMENT
78Y f, who walks with walker, with PMHx of COPD, Bipolar disorder, Parkinsonism, was brought to ER by EMS for unwitnessed fall in morning (07-).     Problem/Plan - 1:  ·  Problem: Fall, initial encounter.  Plan: Unwitnessed fall Possibly mechanical.  Pain right knee. CT spine and head and X rays spine, chest and limb negative for fracture.  will obtain ortho consult.  laceration 4cm just above left eye lid.  ordered orthostatic vitals.  Vit b12 and vit d level ordered.  PT evaluation ordered.  TTE and Carotid doppler pending.      Problem/Plan - 2:  ·  Problem: UTI (urinary tract infection).  Plan: UA shows moderate leukocytes esterase concentration,26-50 WBC's, trace blood and few bacteria.  WBC count of 13.1.  Ceftriaxone 1G q 24 hour started.  Followup with urine culture.      Problem/Plan - 3:  ·  Problem: Ambulatory dysfunction.  Plan: Pt walks with walker. have no help or support at home.      Problem/Plan - 4:  ·  Problem: Parkinson's disease.  Plan: hand tremors noted. no stiffness.  on Trihexyphenidyl.      Problem/Plan - 5:  ·  Problem: HLD (hyperlipidemia).  Plan: pt taking simvastatin.      Problem/Plan - 6:  Problem: Bipolar 1 disorder. Plan: Pt is on lithium and resperidone.     Problem/Plan - 7:  ·  Problem: Chronic obstructive pulmonary disease, unspecified COPD type.  Plan: on ipratropium albuterol inhaler.  on prednisone tablet.     Problem/Plan - 8:  ·  Problem: Rt Knee pain .  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 Moderate medial and lateral compartment arthrosis. Moderate   joint effusion with numerous large ossified loose bodies.    No fracture.    < end of copied text >  Ortho consult pending. PT consult pending.       Problem/Plan - 9:  ·  Problem: Prophylactic measure.  Plan: IMPROVE VTE; DVt prophylaxis.

## 2018-07-07 NOTE — PROGRESS NOTE ADULT - SUBJECTIVE AND OBJECTIVE BOX
INTERVAL HPI/OVERNIGHT EVENTS: My rt knee hurts ,otherwise fine.   Vital Signs Last 24 Hrs  T(C): 36.6 (2018 05:08), Max: 36.8 (2018 10:46)  T(F): 97.9 (2018 05:08), Max: 98.3 (2018 10:46)  HR: 53 (2018 05:08) (53 - 105)  BP: 153/64 (2018 05:08) (139/89 - 171/92)  BP(mean): --  RR: 18 (2018 05:08) (17 - 18)  SpO2: 97% (2018 05:08) (95% - 100%)  I&O's Summary    MEDICATIONS  (STANDING):  ALBUTerol/ipratropium for Nebulization 3 milliLiter(s) Nebulizer every 6 hours  anastrozole 1 milliGRAM(s) Oral daily  bethanechol 25 milliGRAM(s) Oral two times a day  cefTRIAXone   IVPB 1 Gram(s) IV Intermittent every 24 hours  Cyanocobalamin 100 MICROGram(s) 100 MICROGram(s) Oral daily  heparin  Injectable 5000 Unit(s) SubCutaneous every 8 hours  lithium SR (LITHOBID) 300 milliGRAM(s) Oral two times a day  loratadine 10 milliGRAM(s) Oral daily  pantoprazole    Tablet 40 milliGRAM(s) Oral before breakfast  predniSONE   Tablet 50 milliGRAM(s) Oral daily  risperiDONE   Tablet 1 milliGRAM(s) Oral daily  simvastatin 40 milliGRAM(s) Oral at bedtime  trihexyphenidyl 1 milliGRAM(s) Oral three times a day    MEDICATIONS  (PRN):  acetaminophen   Tablet. 650 milliGRAM(s) Oral every 6 hours PRN Moderate Pain (4 - 6)    LABS:                        10.3   11.5  )-----------( 269      ( 2018 08:02 )             33.4     07-06    142  |  109<H>  |  20<H>  ----------------------------<  111<H>  4.5   |  26  |  1.33<H>    Ca    11.0<H>      2018 12:08  Phos  3.1     07-06  Mg     2.4     07-06    TPro  7.8  /  Alb  3.6  /  TBili  0.8  /  DBili  x   /  AST  12  /  ALT  17  /  AlkPhos  135<H>      PT/INR - ( 2018 12:08 )   PT: 10.4 sec;   INR: 0.96 ratio         PTT - ( 2018 12:08 )  PTT:30.3 sec  Urinalysis Basic - ( 2018 12:19 )    Color: Yellow / Appearance: Hazy / S.005 / pH: x  Gluc: x / Ketone: Negative  / Bili: Negative / Urobili: Negative   Blood: x / Protein: 15 / Nitrite: Negative   Leuk Esterase: Moderate / RBC: 2-5 /HPF / WBC 26-50 /HPF   Sq Epi: x / Non Sq Epi: Occasional /HPF / Bacteria: Few /HPF      CAPILLARY BLOOD GLUCOSE            Urinalysis Basic - ( 2018 12:19 )    Color: Yellow / Appearance: Hazy / S.005 / pH: x  Gluc: x / Ketone: Negative  / Bili: Negative / Urobili: Negative   Blood: x / Protein: 15 / Nitrite: Negative   Leuk Esterase: Moderate / RBC: 2-5 /HPF / WBC 26-50 /HPF   Sq Epi: x / Non Sq Epi: Occasional /HPF / Bacteria: Few /HPF      REVIEW OF SYSTEMS:  CONSTITUTIONAL: No fever, weight loss, or fatigue  EYES: No eye pain, visual disturbances, or discharge  ENMT:  No difficulty hearing, tinnitus, vertigo; No sinus or throat pain  NECK: No pain or stiffness  BREASTS: No pain, masses, or nipple discharge  RESPIRATORY: No cough, wheezing, chills or hemoptysis; No shortness of breath  CARDIOVASCULAR: No chest pain, palpitations, dizziness, or leg swelling  GASTROINTESTINAL: No abdominal or epigastric pain. No nausea, vomiting, or hematemesis; No diarrhea or constipation. No melena or hematochezia.  GENITOURINARY: No dysuria, frequency, hematuria, or incontinence  NEUROLOGICAL: No headaches, memory loss, loss of strength, numbness, or tremors  SKIN: No itching, burning, rashes, or lesions   LYMPH NODES: No enlarged glands  ENDOCRINE: No heat or cold intolerance; No hair loss  MUSCULOSKELETAL: rt knee pain       Consultant(s) Notes Reviewed:  [x ] YES  [ ] NO    PHYSICAL EXAM:  GENERAL: NAD, well-groomed, well-developed, not in any distress ,  HEAD:  Atraumatic, Normocephalic  EYES: EOMI, PERRLA, conjunctiva and sclera clear, laceration left eyebrow area+  ENMT: No tonsillar erythema, exudates, or enlargement; Moist mucous membranes, Good dentition, No lesions  NECK: Supple, No JVD, Normal thyroid  NERVOUS SYSTEM:  Alert & Oriented X3, No focal deficit   CHEST/LUNG: Good air entry bilateral with no  rales, rhonchi, wheezing, or rubs  HEART: Regular rate and rhythm; No murmurs, rubs, or gallops  ABDOMEN: Soft, Nontender, Nondistended; Bowel sounds present  EXTREMITIES:  2+ Peripheral Pulses, No clubbing, cyanosis, or edema  SKIN: No rashes or lesions    Care Discussed with Consultants/Other Providers [ x] YES  [ ] NO

## 2018-07-08 LAB
24R-OH-CALCIDIOL SERPL-MCNC: 19.1 NG/ML — LOW (ref 30–80)
ANION GAP SERPL CALC-SCNC: 7 MMOL/L — SIGNIFICANT CHANGE UP (ref 5–17)
BUN SERPL-MCNC: 31 MG/DL — HIGH (ref 7–18)
CALCIUM SERPL-MCNC: 10.2 MG/DL — SIGNIFICANT CHANGE UP (ref 8.4–10.5)
CHLORIDE SERPL-SCNC: 107 MMOL/L — SIGNIFICANT CHANGE UP (ref 96–108)
CO2 SERPL-SCNC: 26 MMOL/L — SIGNIFICANT CHANGE UP (ref 22–31)
CREAT SERPL-MCNC: 1.46 MG/DL — HIGH (ref 0.5–1.3)
GLUCOSE SERPL-MCNC: 106 MG/DL — HIGH (ref 70–99)
HCT VFR BLD CALC: 34 % — LOW (ref 34.5–45)
HGB BLD-MCNC: 10.6 G/DL — LOW (ref 11.5–15.5)
MCHC RBC-ENTMCNC: 28.7 PG — SIGNIFICANT CHANGE UP (ref 27–34)
MCHC RBC-ENTMCNC: 31.1 GM/DL — LOW (ref 32–36)
MCV RBC AUTO: 92.1 FL — SIGNIFICANT CHANGE UP (ref 80–100)
PLATELET # BLD AUTO: 252 K/UL — SIGNIFICANT CHANGE UP (ref 150–400)
POTASSIUM SERPL-MCNC: 4.1 MMOL/L — SIGNIFICANT CHANGE UP (ref 3.5–5.3)
POTASSIUM SERPL-SCNC: 4.1 MMOL/L — SIGNIFICANT CHANGE UP (ref 3.5–5.3)
RBC # BLD: 3.7 M/UL — LOW (ref 3.8–5.2)
RBC # FLD: 14.1 % — SIGNIFICANT CHANGE UP (ref 10.3–14.5)
SODIUM SERPL-SCNC: 140 MMOL/L — SIGNIFICANT CHANGE UP (ref 135–145)
WBC # BLD: 12.5 K/UL — HIGH (ref 3.8–10.5)
WBC # FLD AUTO: 12.5 K/UL — HIGH (ref 3.8–10.5)

## 2018-07-08 PROCEDURE — 99232 SBSQ HOSP IP/OBS MODERATE 35: CPT

## 2018-07-08 RX ORDER — ERGOCALCIFEROL 1.25 MG/1
50000 CAPSULE ORAL
Qty: 0 | Refills: 0 | Status: DISCONTINUED | OUTPATIENT
Start: 2018-07-08 | End: 2018-07-17

## 2018-07-08 RX ADMIN — LORATADINE 10 MILLIGRAM(S): 10 TABLET ORAL at 13:01

## 2018-07-08 RX ADMIN — ERGOCALCIFEROL 50000 UNIT(S): 1.25 CAPSULE ORAL at 13:01

## 2018-07-08 RX ADMIN — Medication 1 MILLIGRAM(S): at 21:06

## 2018-07-08 RX ADMIN — HEPARIN SODIUM 5000 UNIT(S): 5000 INJECTION INTRAVENOUS; SUBCUTANEOUS at 05:15

## 2018-07-08 RX ADMIN — Medication 3 MILLILITER(S): at 20:17

## 2018-07-08 RX ADMIN — Medication 1 MILLIGRAM(S): at 05:15

## 2018-07-08 RX ADMIN — Medication 25 MILLIGRAM(S): at 18:17

## 2018-07-08 RX ADMIN — Medication 3 MILLILITER(S): at 15:21

## 2018-07-08 RX ADMIN — Medication 25 MILLIGRAM(S): at 05:15

## 2018-07-08 RX ADMIN — LITHIUM CARBONATE 300 MILLIGRAM(S): 300 TABLET, EXTENDED RELEASE ORAL at 18:17

## 2018-07-08 RX ADMIN — Medication 650 MILLIGRAM(S): at 18:49

## 2018-07-08 RX ADMIN — CEFTRIAXONE 100 GRAM(S): 500 INJECTION, POWDER, FOR SOLUTION INTRAMUSCULAR; INTRAVENOUS at 18:18

## 2018-07-08 RX ADMIN — Medication 3 MILLILITER(S): at 02:09

## 2018-07-08 RX ADMIN — PANTOPRAZOLE SODIUM 40 MILLIGRAM(S): 20 TABLET, DELAYED RELEASE ORAL at 05:17

## 2018-07-08 RX ADMIN — Medication 650 MILLIGRAM(S): at 18:19

## 2018-07-08 RX ADMIN — LITHIUM CARBONATE 300 MILLIGRAM(S): 300 TABLET, EXTENDED RELEASE ORAL at 05:15

## 2018-07-08 RX ADMIN — SIMVASTATIN 40 MILLIGRAM(S): 20 TABLET, FILM COATED ORAL at 21:06

## 2018-07-08 RX ADMIN — Medication 1 MILLIGRAM(S): at 13:00

## 2018-07-08 RX ADMIN — Medication 50 MILLIGRAM(S): at 05:15

## 2018-07-08 RX ADMIN — RISPERIDONE 1 MILLIGRAM(S): 4 TABLET ORAL at 13:01

## 2018-07-08 RX ADMIN — ANASTROZOLE 1 MILLIGRAM(S): 1 TABLET ORAL at 13:01

## 2018-07-08 RX ADMIN — HEPARIN SODIUM 5000 UNIT(S): 5000 INJECTION INTRAVENOUS; SUBCUTANEOUS at 18:17

## 2018-07-08 NOTE — PHYSICAL THERAPY INITIAL EVALUATION ADULT - IMPAIRMENTS FOUND, PT EVAL
ROM/gait, locomotion, and balance/joint integrity and mobility/muscle strength/poor safety awareness

## 2018-07-08 NOTE — PROGRESS NOTE ADULT - SUBJECTIVE AND OBJECTIVE BOX
Subjective: pt seen and examined, no complaints on exam.   no chest pain or palpitation on exam     acetaminophen   Tablet. 650 milliGRAM(s) Oral every 6 hours PRN  ALBUTerol/ipratropium for Nebulization 3 milliLiter(s) Nebulizer every 6 hours  anastrozole 1 milliGRAM(s) Oral daily  bethanechol 25 milliGRAM(s) Oral two times a day  cefTRIAXone   IVPB 1 Gram(s) IV Intermittent every 24 hours  Cyanocobalamin 100 MICROGram(s) 100 MICROGram(s) Oral daily  heparin  Injectable 5000 Unit(s) SubCutaneous every 12 hours  lithium SR (LITHOBID) 300 milliGRAM(s) Oral two times a day  loratadine 10 milliGRAM(s) Oral daily  pantoprazole    Tablet 40 milliGRAM(s) Oral before breakfast  predniSONE   Tablet 50 milliGRAM(s) Oral daily  risperiDONE   Tablet 1 milliGRAM(s) Oral daily  simvastatin 40 milliGRAM(s) Oral at bedtime  trihexyphenidyl 1 milliGRAM(s) Oral three times a day                            10.3   11.5  )-----------( 269      ( 07 Jul 2018 08:02 )             33.4       Hemoglobin: 10.3 g/dL (07-07 @ 08:02)  Hemoglobin: 11.8 g/dL (07-06 @ 12:08)      07-06    142  |  109<H>  |  20<H>  ----------------------------<  111<H>  4.5   |  26  |  1.33<H>    Ca    11.0<H>      06 Jul 2018 12:08  Phos  3.1     07-06  Mg     2.4     07-06    TPro  7.8  /  Alb  3.6  /  TBili  0.8  /  DBili  x   /  AST  12  /  ALT  17  /  AlkPhos  135<H>  07-06    Creatinine Trend: 1.33<--    COAGS:     CARDIAC MARKERS ( 06 Jul 2018 12:09 )  x     / x     / 51 U/L / x     / x      CARDIAC MARKERS ( 06 Jul 2018 12:08 )  <0.015 ng/mL / x     / x     / x     / x            T(C): 36.4 (07-07-18 @ 20:59), Max: 36.8 (07-07-18 @ 14:40)  HR: 67 (07-07-18 @ 20:59) (53 - 67)  BP: 118/55 (07-07-18 @ 20:59) (118/53 - 153/64)  RR: 14 (07-07-18 @ 20:59) (14 - 18)  SpO2: 98% (07-07-18 @ 20:59) (97% - 100%)  Wt(kg): --    I&O's Summary      Appearance: Normal	  HEENT:   Normal oral mucosa, PERRL, EOMI	  Lymphatic: No lymphadenopathy , no edema  Cardiovascular: Normal S1 S2, No JVD, No murmurs , Peripheral pulses palpable 2+ bilaterally  Respiratory: Lungs clear to auscultation, normal effort 	  Gastrointestinal:  Soft, Non-tender, + BS	  Skin: No rashes, No ecchymoses, No cyanosis, warm to touch  Musculoskeletal: Normal range of motion, normal strength  Psychiatry:  Mood & affect appropriate    TELEMETRY: 	none       DIAGNOSTIC TESTING:  [ ] Echocardiogram: < from: Transthoracic Echocardiogram (01.17.17 @ 07:05) >  ------------------------------------------------------------------------  CONCLUSIONS:  1. Normal mitral valve. Mild mitral regurgitation.  2. Normal trileaflet aortic valve.  3. Normal aortic root.  4. Normal left atrium.  5. Normal left ventricular internal dimensions and wall  thicknesses.  6. Normal Left Ventricular Systolic Function,  (EF = 55 to  60%)  7. Grade II diastolic dysfunction  8. Normal right atrium.  9. Poorly imaged right ventricle.  10. RA Pressure is 10 mm Hg.  11. RVS Pressure is 29 mm Hg.  12. Not well visualized, probably normal.  13. Normal pericardium with no pericardial effusion.    < end of copied text >    [ ]  Catheterization:  [ ] Stress Test:    OTHER: 	        ASSESSMENT/PLAN: 	78y Female PMH COPD and Parkinsons admitted with recurrent falls.    repeat echo pending   ABX per medicine  cont statin ,   no s/s of chf on exam  pain management   D/W Dr Us

## 2018-07-08 NOTE — PHYSICAL THERAPY INITIAL EVALUATION ADULT - TRANSFER SAFETY CONCERNS NOTED: SIT/STAND, REHAB EVAL
decreased step length/decreased weight-shifting ability/decreased balance during turns/decreased safety awareness

## 2018-07-08 NOTE — PHYSICAL THERAPY INITIAL EVALUATION ADULT - PERTINENT HX OF CURRENT PROBLEM, REHAB EVAL
Pt is a 77 y/o female admitted to [facility] due to S/P fall w/ c/o pain to Rt knee. (+) CT of Head (-) acute findings; X-ray of Rt hip and knee (-) fx/dislocation.

## 2018-07-08 NOTE — PHYSICAL THERAPY INITIAL EVALUATION ADULT - TRANSFER SAFETY CONCERNS NOTED: BED/CHAIR, REHAB EVAL
decreased balance during turns/decreased step length/decreased weight-shifting ability/decreased safety awareness

## 2018-07-08 NOTE — PHYSICAL THERAPY INITIAL EVALUATION ADULT - PLANNED THERAPY INTERVENTIONS, PT EVAL
strengthening/transfer training/gait training/neuromuscular re-education/balance training/ROM/bed mobility training

## 2018-07-08 NOTE — PHYSICAL THERAPY INITIAL EVALUATION ADULT - PASSIVE RANGE OF MOTION EXAMINATION, REHAB EVAL
bilateral upper extremity Passive ROM was WNL (within normal limits)/bilateral lower extremity Passive ROM was WNL/Except Rt knee limited due to pain

## 2018-07-08 NOTE — PHYSICAL THERAPY INITIAL EVALUATION ADULT - GENERAL OBSERVATIONS, REHAB EVAL
Alert. O x 2. Able to follow simple commands/directions. (+) heplock, (+) steri tape to Lt forehead, (+) resting tremors to BUE

## 2018-07-08 NOTE — PHYSICAL THERAPY INITIAL EVALUATION ADULT - ACTIVE RANGE OF MOTION EXAMINATION, REHAB EVAL
bilateral lower extremity Active ROM was WNL (within normal limits)/robert. upper extremity Active ROM was WNL (within normal limits)/Except Rt knee limited due to pain

## 2018-07-08 NOTE — PHYSICAL THERAPY INITIAL EVALUATION ADULT - RANGE OF MOTION EXAMINATION, REHAB EVAL
bilateral lower extremity was ROM was WNL (within normal limits)/Except Rt knee limited due to pain./bilateral upper extremity ROM was WNL (within normal limits)/deficits as listed below

## 2018-07-08 NOTE — PROGRESS NOTE ADULT - ASSESSMENT
78Y f, who walks with walker, with PMHx of COPD, Bipolar disorder, Parkinsonism, was brought to ER by EMS for unwitnessed fall in morning (07-).     Problem/Plan - 1:  ·  Problem: Fall, initial encounter.  Plan: Unwitnessed fall Possibly mechanical.  Pain right knee. CT spine and head and X rays spine, chest and limb negative for fracture.  will obtain ortho consult.  laceration 4cm just above left eye lid.  ordered orthostatic vitals.  Vit b12 and vit d level ordered.  PT evaluation ordered.  TTE  pending and Carotid doppler noted      Problem/Plan - 2:  ·  Problem: UTI (urinary tract infection).  Plan: UA shows moderate leukocytes esterase concentration,26-50 WBC's, trace blood and few bacteria.  WBC count of 13.1.  Ceftriaxone 1G q 24 hour started.Will change to PO ceftin 250mg bid to complete 5 days abxs.   Followup with urine culture.      Problem/Plan - 3:  ·  Problem: Ambulatory dysfunction.  Plan: Pt walks with walker. have no help or support at home.      Problem/Plan - 4:  ·  Problem: Parkinson's disease.  Plan: hand tremors noted. no stiffness.  on Trihexyphenidyl.      Problem/Plan - 5:  ·  Problem: HLD (hyperlipidemia).  Plan: pt taking simvastatin.      Problem/Plan - 6:  Problem: Bipolar 1 disorder. Plan: Pt is on lithium and resperidone.     Problem/Plan - 7:  ·  Problem: Chronic obstructive pulmonary disease, unspecified COPD type.  Plan: on ipratropium albuterol inhaler.  on prednisone tablet.     Problem/Plan - 8:  ·  Problem: Rt Knee pain .  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 Moderate medial and lateral compartment arthrosis. Moderate   joint effusion with numerous large ossified loose bodies.    No fracture.    < end of copied text >  Ortho consult noted and no intervention . PT consult  noted .       Problem/Plan - 9:  ·  Problem: Prophylactic measure.  Plan: IMPROVE VTE; DVt prophylaxis.        Disposition : SD planning

## 2018-07-08 NOTE — PROGRESS NOTE ADULT - SUBJECTIVE AND OBJECTIVE BOX
INTERVAL HPI/OVERNIGHT EVENTS: Sitting in [place] . I feel fine and walk with walker.   Vital Signs Last 24 Hrs  T(C): 36.4 (08 Jul 2018 04:25), Max: 36.8 (07 Jul 2018 14:40)  T(F): 97.6 (08 Jul 2018 04:25), Max: 98.3 (07 Jul 2018 14:40)  HR: 72 (08 Jul 2018 10:00) (61 - 73)  BP: 132/54 (08 Jul 2018 10:00) (118/53 - 132/54)  BP(mean): --  RR: 15 (08 Jul 2018 10:00) (14 - 17)  SpO2: 100% (08 Jul 2018 10:00) (98% - 100%)  I&O's Summary    MEDICATIONS  (STANDING):  ALBUTerol/ipratropium for Nebulization 3 milliLiter(s) Nebulizer every 6 hours  anastrozole 1 milliGRAM(s) Oral daily  bethanechol 25 milliGRAM(s) Oral two times a day  cefTRIAXone   IVPB 1 Gram(s) IV Intermittent every 24 hours  Cyanocobalamin 100 MICROGram(s) 100 MICROGram(s) Oral daily  ergocalciferol 01313 Unit(s) Oral <User Schedule>  heparin  Injectable 5000 Unit(s) SubCutaneous every 12 hours  lithium SR (LITHOBID) 300 milliGRAM(s) Oral two times a day  loratadine 10 milliGRAM(s) Oral daily  pantoprazole    Tablet 40 milliGRAM(s) Oral before breakfast  predniSONE   Tablet 50 milliGRAM(s) Oral daily  risperiDONE   Tablet 1 milliGRAM(s) Oral daily  simvastatin 40 milliGRAM(s) Oral at bedtime  trihexyphenidyl 1 milliGRAM(s) Oral three times a day    MEDICATIONS  (PRN):  acetaminophen   Tablet. 650 milliGRAM(s) Oral every 6 hours PRN Moderate Pain (4 - 6)    LABS:                        10.6   12.5  )-----------( 252      ( 08 Jul 2018 05:54 )             34.0     07-08    140  |  107  |  31<H>  ----------------------------<  106<H>  4.1   |  26  |  1.46<H>    Ca    10.2      08 Jul 2018 05:54          CAPILLARY BLOOD GLUCOSE              REVIEW OF SYSTEMS:  CONSTITUTIONAL: No fever, weight loss, or fatigue  EYES: No eye pain, visual disturbances, or discharge  ENMT:  No difficulty hearing, tinnitus, vertigo; No sinus or throat pain  NECK: No pain or stiffness  BREASTS: No pain, masses, or nipple discharge  RESPIRATORY: No cough, wheezing, chills or hemoptysis; No shortness of breath  CARDIOVASCULAR: No chest pain, palpitations, dizziness, or leg swelling  GASTROINTESTINAL: No abdominal or epigastric pain. No nausea, vomiting, or hematemesis; No diarrhea or constipation. No melena or hematochezia.  GENITOURINARY: No dysuria, frequency, hematuria, or incontinence  NEUROLOGICAL: No headaches, memory loss, loss of strength, numbness, or tremors  SKIN: No itching, burning, rashes, or lesions   LYMPH NODES: No enlarged glands  ENDOCRINE: No heat or cold intolerance; No hair loss  MUSCULOSKELETAL: No joint pain or swelling; No muscle, back, or extremity pain    Consultant(s) Notes Reviewed:  [x ] YES  [ ] NO    PHYSICAL EXAM:  GENERAL: NAD, well-groomed, well-developed, not in any distress ,  HEAD:  Atraumatic, Normocephalic  EYES: EOMI, PERRLA, conjunctiva and sclera clear, above left eyelid laceration   ENMT: No tonsillar erythema, exudates, or enlargement; Moist mucous membranes, Good dentition, No lesions  NECK: Supple, No JVD, Normal thyroid  NERVOUS SYSTEM:  Alert & Oriented X3, No focal deficit   CHEST/LUNG: Good air entry bilateral with no  rales, rhonchi, wheezing, or rubs  HEART: Regular rate and rhythm; No murmurs, rubs, or gallops  ABDOMEN: Soft, Nontender, Nondistended; Bowel sounds present  EXTREMITIES:  2+ Peripheral Pulses, No clubbing, cyanosis, or edema  Rt knee Crepitus +    Care Discussed with Consultants/Other Providers [ x] YES  [ ] NO

## 2018-07-09 ENCOUNTER — TRANSCRIPTION ENCOUNTER (OUTPATIENT)
Age: 78
End: 2018-07-09

## 2018-07-09 LAB
ANION GAP SERPL CALC-SCNC: 7 MMOL/L — SIGNIFICANT CHANGE UP (ref 5–17)
BUN SERPL-MCNC: 36 MG/DL — HIGH (ref 7–18)
CALCIUM SERPL-MCNC: 10.3 MG/DL — SIGNIFICANT CHANGE UP (ref 8.4–10.5)
CHLORIDE SERPL-SCNC: 109 MMOL/L — HIGH (ref 96–108)
CO2 SERPL-SCNC: 25 MMOL/L — SIGNIFICANT CHANGE UP (ref 22–31)
CREAT SERPL-MCNC: 1.49 MG/DL — HIGH (ref 0.5–1.3)
CULTURE RESULTS: SIGNIFICANT CHANGE UP
GLUCOSE SERPL-MCNC: 116 MG/DL — HIGH (ref 70–99)
HCT VFR BLD CALC: 33.8 % — LOW (ref 34.5–45)
HGB BLD-MCNC: 10.6 G/DL — LOW (ref 11.5–15.5)
MCHC RBC-ENTMCNC: 29.1 PG — SIGNIFICANT CHANGE UP (ref 27–34)
MCHC RBC-ENTMCNC: 31.5 GM/DL — LOW (ref 32–36)
MCV RBC AUTO: 92.4 FL — SIGNIFICANT CHANGE UP (ref 80–100)
PLATELET # BLD AUTO: 257 K/UL — SIGNIFICANT CHANGE UP (ref 150–400)
POTASSIUM SERPL-MCNC: 5.3 MMOL/L — SIGNIFICANT CHANGE UP (ref 3.5–5.3)
POTASSIUM SERPL-SCNC: 5.3 MMOL/L — SIGNIFICANT CHANGE UP (ref 3.5–5.3)
RBC # BLD: 3.66 M/UL — LOW (ref 3.8–5.2)
RBC # FLD: 14 % — SIGNIFICANT CHANGE UP (ref 10.3–14.5)
SODIUM SERPL-SCNC: 141 MMOL/L — SIGNIFICANT CHANGE UP (ref 135–145)
SPECIMEN SOURCE: SIGNIFICANT CHANGE UP
WBC # BLD: 14.6 K/UL — HIGH (ref 3.8–10.5)
WBC # FLD AUTO: 14.6 K/UL — HIGH (ref 3.8–10.5)

## 2018-07-09 RX ORDER — SODIUM CHLORIDE 9 MG/ML
1000 INJECTION, SOLUTION INTRAVENOUS
Qty: 0 | Refills: 0 | Status: DISCONTINUED | OUTPATIENT
Start: 2018-07-09 | End: 2018-07-10

## 2018-07-09 RX ORDER — SODIUM CHLORIDE 9 MG/ML
500 INJECTION INTRAMUSCULAR; INTRAVENOUS; SUBCUTANEOUS ONCE
Qty: 0 | Refills: 0 | Status: COMPLETED | OUTPATIENT
Start: 2018-07-09 | End: 2018-07-09

## 2018-07-09 RX ADMIN — Medication 25 MILLIGRAM(S): at 05:56

## 2018-07-09 RX ADMIN — ANASTROZOLE 1 MILLIGRAM(S): 1 TABLET ORAL at 14:00

## 2018-07-09 RX ADMIN — LITHIUM CARBONATE 300 MILLIGRAM(S): 300 TABLET, EXTENDED RELEASE ORAL at 05:55

## 2018-07-09 RX ADMIN — HEPARIN SODIUM 5000 UNIT(S): 5000 INJECTION INTRAVENOUS; SUBCUTANEOUS at 18:19

## 2018-07-09 RX ADMIN — Medication 1 MILLIGRAM(S): at 05:56

## 2018-07-09 RX ADMIN — Medication 25 MILLIGRAM(S): at 18:19

## 2018-07-09 RX ADMIN — RISPERIDONE 1 MILLIGRAM(S): 4 TABLET ORAL at 14:02

## 2018-07-09 RX ADMIN — Medication 50 MILLIGRAM(S): at 05:56

## 2018-07-09 RX ADMIN — SODIUM CHLORIDE 1000 MILLILITER(S): 9 INJECTION INTRAMUSCULAR; INTRAVENOUS; SUBCUTANEOUS at 14:03

## 2018-07-09 RX ADMIN — LITHIUM CARBONATE 300 MILLIGRAM(S): 300 TABLET, EXTENDED RELEASE ORAL at 18:19

## 2018-07-09 RX ADMIN — Medication 1 MILLIGRAM(S): at 21:20

## 2018-07-09 RX ADMIN — PANTOPRAZOLE SODIUM 40 MILLIGRAM(S): 20 TABLET, DELAYED RELEASE ORAL at 05:55

## 2018-07-09 RX ADMIN — SIMVASTATIN 40 MILLIGRAM(S): 20 TABLET, FILM COATED ORAL at 21:22

## 2018-07-09 RX ADMIN — LORATADINE 10 MILLIGRAM(S): 10 TABLET ORAL at 14:01

## 2018-07-09 RX ADMIN — SODIUM CHLORIDE 100 MILLILITER(S): 9 INJECTION, SOLUTION INTRAVENOUS at 18:21

## 2018-07-09 RX ADMIN — HEPARIN SODIUM 5000 UNIT(S): 5000 INJECTION INTRAVENOUS; SUBCUTANEOUS at 05:56

## 2018-07-09 RX ADMIN — CEFTRIAXONE 100 GRAM(S): 500 INJECTION, POWDER, FOR SOLUTION INTRAMUSCULAR; INTRAVENOUS at 18:19

## 2018-07-09 RX ADMIN — Medication 1 MILLIGRAM(S): at 14:00

## 2018-07-09 NOTE — DISCHARGE NOTE ADULT - CARE PLAN
Principal Discharge DX:	Fall, initial encounter  Goal:	safety  Assessment and plan of treatment:	All studies were negative for fracture and/or cardiovascular/neuro status.  PT recommended home PT  Followup with PCP in one week.  Secondary Diagnosis:	Ambulatory dysfunction  Assessment and plan of treatment:	PT recommended home PT  Secondary Diagnosis:	Bipolar 1 disorder  Assessment and plan of treatment:	Continue with home meds  Continue with lithium level monitoring at assisted living facility  Secondary Diagnosis:	HLD (hyperlipidemia)  Assessment and plan of treatment:	Continue with statin  Secondary Diagnosis:	Parkinson's disease  Assessment and plan of treatment:	Continue with home meds  Secondary Diagnosis:	UTI (urinary tract infection)  Assessment and plan of treatment:	You were initially treated with antibiotic; however, UC was negative and antibiotic was discontinued  Followup with PCP at assisted living facility Principal Discharge DX:	Fall, initial encounter  Goal:	safety  Assessment and plan of treatment:	All studies were negative for fracture and/or cardiovascular/neuro status.  PT recommended home PT  Followup with PCP in one week.  Secondary Diagnosis:	Ambulatory dysfunction  Assessment and plan of treatment:	PT recommended home PT  Secondary Diagnosis:	Bipolar 1 disorder  Assessment and plan of treatment:	Continue with home meds  Continue with lithium level monitoring at assisted living facility  Secondary Diagnosis:	UTI (urinary tract infection)  Assessment and plan of treatment:	you completed 7 days of Rocephin.   Followup with PCP at assisted living facility  Secondary Diagnosis:	Encephalopathy  Assessment and plan of treatment:	you had mental status changes due to urinary tract infection and lithium toxicity. Now resolved. Principal Discharge DX:	Fall, initial encounter  Goal:	safety  Assessment and plan of treatment:	All studies were negative for fracture and/or cardiovascular/neuro status.  PT recommended home PT  Followup with PCP in one week.  Secondary Diagnosis:	Ambulatory dysfunction  Assessment and plan of treatment:	PT recommended home PT  Secondary Diagnosis:	Bipolar 1 disorder  Assessment and plan of treatment:	Your lithium levels were elevated and your home dose of lithium was held.    Continue with lower dosed at lithium 150 mg., q12H.  Do not take resperidal.  Your last lithium level was 0.8.  Followup with your private psychiatrist for dosage adjustments  and treatment, as well as lithium level monitoring.  Secondary Diagnosis:	UTI (urinary tract infection)  Assessment and plan of treatment:	you completed 7 days of Rocephin.   Followup with PCP at assisted living facility  Secondary Diagnosis:	Encephalopathy  Assessment and plan of treatment:	you had mental status changes due to urinary tract infection and lithium toxicity. Now resolved.  Dr. Farfan, neurologist, consulted and recommended MRI of the brain in the outpatient setting as well as followup with neurologist.  Secondary Diagnosis:	Hypercalcemia  Assessment and plan of treatment:	Your calcium levels were elevated and an US of the thyroid and parathyroid showed:  " 1.1 cm solid thyroid nodule in the midpole of the right lobe.   Possible 2.1 cm solid thyroid nodule in the lower pole of the right lobe.   If clinically indicated, thyroid scintigraphy and parathyroid   scintigraphy may be pursued for further evaluation. Ultrasound-guided   percutaneous needle biopsy may be considered after additional imagings   are performed.  Dr. Barros, endocrinologist, consulted and recommended a Fine Need Aspiration biopsy of the nodules to rule out cancer.  You are to followup with your PCP, Dr. Lares, within the next few days to refer you for biopsy.  You are being supplied with Dr. Barros's contact information, if you chose to followup with his for this procedure.      Thickened thyroid isthmus.  Secondary Diagnosis:	Leukocytosis  Assessment and plan of treatment:	Your WBC levels were elevated to 20.9 (7/16/18).  You were evaluated by the Infectious Disease specialist who stated were due to lithium and steroids (not infectious etiology).  You have completed the steroids.  Repeat WBC in three days to monitor.  Followup with PCP at assisted living facility.

## 2018-07-09 NOTE — DISCHARGE NOTE ADULT - CARE PROVIDERS DIRECT ADDRESSES
[email or web address]

## 2018-07-09 NOTE — DISCHARGE NOTE ADULT - SECONDARY DIAGNOSIS.
Ambulatory dysfunction Bipolar 1 disorder HLD (hyperlipidemia) Parkinson's disease UTI (urinary tract infection) Encephalopathy Hypercalcemia Leukocytosis

## 2018-07-09 NOTE — DISCHARGE NOTE ADULT - HOSPITAL COURSE
78Y f from an assisted living facility, who walks with walker, with PMHx of COPD, Bipolar disorder, Parkinsonism, was brought to ER by EMS for unwitnessed fall.  Pt was admitted for workup.      Fall, initial encounter.   Unwitnessed fall Possibly mechanical as was trying to  cell phone.   CT spine and head and X rays spine, chest and limb negative for fracture.  Patient c/o of right knee pain, and a CT of the right knee showed severe patellofemoral arthrosis with chronic lateral subluxation of the patella. Moderate medial and lateral compartment arthrosis. Moderate joint effusion with numerous large ossified loose bodies.  Orthopedist consulted and stated no intervention necessary.    No  orthostasis .   TTE  showed normal left ventricular function and grade 2 dd. and Carotid doppler showed no significant stenosis.    PT recommended home with home PT.    UTI (urinary tract infection).    UA shows moderate leukocytes esterase concentration, 26-50 WBC's, trace blood and few bacteria.  Mild leukocytosis  Ceftriaxone 1G q 24 hour started  UC negative/Ceftriaxone discontinued    Ambulatory dysfunction.    Pt walks with walker  PT recommended home PT    Parkinson's disease.    hand tremors noted. no stiffness.  on Trihexyphenidyl.     HLD (hyperlipidemia).    Pt is continued with simvastatin.     Bipolar 1 disorder.  Pt is on lithium and risperidone.   Pt has Lithium level check weekly at assisted  living facility, per daughter.     Chronic obstructive pulmonary disease,   Pt continued with ipratropium albuterol inhaler, prednisone tablet.     TAMARA on CKD stage 3   1.46  IVF   BMP monitored/       Prophylactic Measures  IMPROVE VTE; DVt prophylaxis.    Attending cleared patient for discharge back to assited living. 78Y f from an assisted living facility, who walks with walker, with PMHx of COPD, Bipolar disorder, Parkinsonism, was brought to ER by EMS for unwitnessed fall.  Pt was admitted for workup.      Fall, initial encounter.   Unwitnessed fall Possibly mechanical as was trying to  cell phone.   CT spine and head and X rays spine, chest and limb negative for fracture.    Patient c/o of right knee pain, and a CT of the right knee showed severe   patellofemoral arthrosis with chronic lateral subluxation of the patella.   Moderate medial and lateral compartment arthrosis. Moderate joint effusion with   numerous large ossified loose bodies.  Orthopedist consulted and stated no intervention necessary.    No  orthostasis .   TTE  showed normal left ventricular function and grade 2 dd. and Carotid doppler showed no significant stenosis.    PT recommended home with home PT.    UTI (urinary tract infection).    UA shows moderate leukocytes esterase concentration, 26-50 WBC's, trace blood and few bacteria.  Mild leukocytosis  Ceftriaxone 1G q 24 hours  UC negative/Ceftriaxone discontinued    Leukocytosis  Your WBC levels were elevated to 20.9 (7/16/18).  You were evaluated by the Infectious   Disease specialist who stated were due to lithium and steroids (not infectious etiology).    You have completed the steroids.  Repeat WBC in three days to monitor.  Followup with   PCP at assisted living facility.    Ambulatory dysfunction.    Pt walks with walker  PT recommended home PT    Parkinson's disease.    hand tremors noted. no stiffness.  on Trihexyphenidyl.     HLD (hyperlipidemia).    Pt is continued with simvastatin.     Bipolar 1 disorder.  Pt is on lithium and risperidone.   Pt has Lithium level checked weekly at assisted  living facility, per daughter.   Your lithium levels were elevated and your home dose of lithium was held.    Continue with lower dosed at lithium 150 mg., q12H.  Do not take resperidal.  Your last lithium level was 0.8 (7/16/18).  Followup with your private psychiatrist for dosage adjustments  and treatment, as well as lithium level monitoring.    Chronic obstructive pulmonary disease,   Pt continued with ipratropium albuterol inhaler, prednisone tapered     Encephalopathy  you had mental status changes due to urinary tract infection and lithium toxicity. Now resolved.  Dr. Farfan, neurologist, consulted and recommended MRI of the brain in the outpatient setting as well as followup with neurologist.    TAMARA on CKD stage 3   1.54  IVF   nephrologist consulted  BMP monitored/pt at baseline    Hypercalcemia  Your calcium levels were elevated and an US of the thyroid and parathyroid showed:    " 1.1 cm solid thyroid nodule in the midpole of the right lobe.   Possible 2.1 cm solid thyroid nodule in the lower pole of the right lobe.   Thickened thyroid isthmus."  If clinically indicated, thyroid scintigraphy and parathyroid   scintigraphy may be pursued for further evaluation. Ultrasound-guided   percutaneous needle biopsy may be considered after additional imagings   are performed.  Dr. Barros, endocrinologist, consulted and recommended a Fine Need Aspiration biopsy of the nodules to rule out cancer.    You are to followup with your PCP, Dr. Lares, within the next few days to refer you for biopsy.  You are being   supplied with Dr. Barros's contact information, if you chose to followup with him for this procedure.      Hypernatremia  Your sodium level was 147 on 7/16/18, which after testing and   after nephrologist consultation, was attributed to dehydration.    You were given IV fluid, and it is strongly encouraged to drink   plenty of fluids.  Repeat sodium level in three days.       Prophylactic Measures  IMPROVE VTE; DVt prophylaxis.    Attending cleared patient for discharge back to assited living.

## 2018-07-09 NOTE — PROGRESS NOTE ADULT - ASSESSMENT
78Y f, who walks with walker, with PMHx of COPD, Bipolar disorder, Parkinsonism, was brought to ER by EMS for unwitnessed fall in morning (07-).     Problem/Plan - 1:  ·  Problem: Fall, initial encounter.  Plan: Unwitnessed fall Possibly mechanical as was trying to  cell phone.   Pain right knee. CT spine and head and X rays spine, chest and limb negative for fracture.  Ortho helping.   laceration 4cm just above left eye lid.  No  orthostatsis .   replacing Vit D.   PT helping.   TTE  noted and Carotid doppler noted      Problem/Plan - 2:  ·  Problem: UTI (urinary tract infection).  Plan: UA shows moderate leukocytes esterase concentration,26-50 WBC's, trace blood and few bacteria.  WBC count of 13.1.  Ceftriaxone 1G q 24 hour started.Will change to PO ceftin 250mg bid to complete 5 days abxs.    urine culture noted.       Problem/Plan - 3:  ·  Problem: Ambulatory dysfunction.  Plan: Pt walks with walker.      Problem/Plan - 4:  ·  Problem: Parkinson's disease.  Plan: hand tremors noted. no stiffness.  on Trihexyphenidyl.      Problem/Plan - 5:  ·  Problem: HLD (hyperlipidemia).  Plan: pt taking simvastatin.      Problem/Plan - 6:  Problem: Bipolar 1 disorder. Plan: Pt is on lithium and resperidone. Gets Lithium level check weekly there per daughter.      Problem/Plan - 7:  ·  Problem: Chronic obstructive pulmonary disease, unspecified COPD type.  Plan: on ipratropium albuterol inhaler.  on prednisone tablet.     Problem/Plan - 8:  ·  Problem: Rt Knee pain .  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 Moderate medial and lateral compartment arthrosis. Moderate   joint effusion with numerous large ossified loose bodies.    No fracture.    < end of copied text >  Ortho consult noted and no intervention . PT consult  noted .       Problem/Plan - 9:  ·  Problem: CKD stage 3 .  Plan: Per daughter has chronic issue . IVF and will recheck.         Problem/Plan - 10:  ·  Problem: Prophylactic measure.  Plan: IMPROVE VTE; DVt prophylaxis.    Disposition : Dc planning to f/u with PCP with repeat CBC,BMP and Lithium level

## 2018-07-09 NOTE — DISCHARGE NOTE ADULT - PLAN OF CARE
safety All studies were negative for fracture and/or cardiovascular/neuro status.  PT recommended home PT  Followup with PCP in one week. PT recommended home PT Continue with home meds  Continue with lithium level monitoring at assisted living facility Continue with statin Continue with home meds You were initially treated with antibiotic; however, UC was negative and antibiotic was discontinued  Followup with PCP at assisted living [facility] you completed 7 days of Rocephin.   Followup with PCP at assisted living facility you had mental status changes due to urinary tract infection and lithium toxicity. Now resolved. Your lithium levels were elevated and your home dose of lithium was held.    Continue with lower dosed at lithium 150 mg., q12H.  Do not take resperidal.  Your last lithium level was 0.8.  Followup with your private psychiatrist for dosage adjustments  and treatment, as well as lithium level monitoring. you had mental status changes due to urinary tract infection and lithium toxicity. Now resolved.  Dr. Farfan, neurologist, consulted and recommended MRI of the brain in the outpatient setting as well as followup with neurologist. Your calcium levels were elevated and an US of the thyroid and parathyroid showed:  " 1.1 cm solid thyroid nodule in the midpole of the right lobe.   Possible 2.1 cm solid thyroid nodule in the lower pole of the right lobe.   If clinically indicated, thyroid scintigraphy and parathyroid   scintigraphy may be pursued for further evaluation. Ultrasound-guided   percutaneous needle biopsy may be considered after additional imagings   are performed.  Dr. Barros, endocrinologist, consulted and recommended a Fine Need Aspiration biopsy of the nodules to rule out cancer.  You are to followup with your PCP, Dr. Lares, within the next few days to refer you for biopsy.  You are being supplied with Dr. Barros's contact information, if you chose to followup with his for this procedure.      Thickened thyroid isthmus. Your WBC levels were elevated to 20.9 (7/16/18).  You were evaluated by the Infectious Disease specialist who stated were due to lithium and steroids (not infectious etiology).  You have completed the steroids.  Repeat WBC in three days to monitor.  Followup with PCP at assisted living facility.

## 2018-07-09 NOTE — PROGRESS NOTE ADULT - SUBJECTIVE AND OBJECTIVE BOX
Subjective: pt seen and examined, NAD on exam   no events overnight  no chest pain or sob on exam     acetaminophen   Tablet. 650 milliGRAM(s) Oral every 6 hours PRN  ALBUTerol/ipratropium for Nebulization 3 milliLiter(s) Nebulizer every 6 hours  anastrozole 1 milliGRAM(s) Oral daily  bethanechol 25 milliGRAM(s) Oral two times a day  cefTRIAXone   IVPB 1 Gram(s) IV Intermittent every 24 hours  Cyanocobalamin 100 MICROGram(s) 100 MICROGram(s) Oral daily  ergocalciferol 37200 Unit(s) Oral <User Schedule>  heparin  Injectable 5000 Unit(s) SubCutaneous every 12 hours  lithium SR (LITHOBID) 300 milliGRAM(s) Oral two times a day  loratadine 10 milliGRAM(s) Oral daily  pantoprazole    Tablet 40 milliGRAM(s) Oral before breakfast  predniSONE   Tablet 50 milliGRAM(s) Oral daily  risperiDONE   Tablet 1 milliGRAM(s) Oral daily  simvastatin 40 milliGRAM(s) Oral at bedtime  trihexyphenidyl 1 milliGRAM(s) Oral three times a day                            10.6   12.5  )-----------( 252      ( 08 Jul 2018 05:54 )             34.0       Hemoglobin: 10.6 g/dL (07-08 @ 05:54)  Hemoglobin: 10.3 g/dL (07-07 @ 08:02)  Hemoglobin: 11.8 g/dL (07-06 @ 12:08)      07-08    140  |  107  |  31<H>  ----------------------------<  106<H>  4.1   |  26  |  1.46<H>    Ca    10.2      08 Jul 2018 05:54      Creatinine Trend: 1.46<--, 1.33<--    COAGS:     CARDIAC MARKERS ( 06 Jul 2018 12:09 )  x     / x     / 51 U/L / x     / x      CARDIAC MARKERS ( 06 Jul 2018 12:08 )  <0.015 ng/mL / x     / x     / x     / x            T(C): 36.5 (07-08-18 @ 20:34), Max: 37.1 (07-08-18 @ 14:16)  HR: 84 (07-08-18 @ 20:34) (72 - 90)  BP: 111/57 (07-08-18 @ 20:34) (111/57 - 135/56)  RR: 14 (07-08-18 @ 20:34) (14 - 16)  SpO2: 95% (07-08-18 @ 20:34) (95% - 100%)  Wt(kg): --    I&O's Summary      Appearance: Normal	  HEENT:   Normal oral mucosa, PERRL, EOMI	  Lymphatic: No lymphadenopathy , no edema  Cardiovascular: Normal S1 S2, No JVD, No murmurs , Peripheral pulses palpable 2+ bilaterally  Respiratory: Lungs clear to auscultation, normal effort 	  Gastrointestinal:  Soft, Non-tender, + BS	  Skin: No rashes, No ecchymoses, No cyanosis, warm to touch  Musculoskeletal: Normal range of motion, normal strength  Psychiatry:  Mood & affect appropriate    TELEMETRY: 	none       DIAGNOSTIC TESTING:  [ ] Echocardiogram: < from: Transthoracic Echocardiogram (01.17.17 @ 07:05) >  ------------------------------------------------------------------------  CONCLUSIONS:  1. Normal mitral valve. Mild mitral regurgitation.  2. Normal trileaflet aortic valve.  3. Normal aortic root.  4. Normal left atrium.  5. Normal left ventricular internal dimensions and wall  thicknesses.  6. Normal Left Ventricular Systolic Function,  (EF = 55 to  60%)  7. Grade II diastolic dysfunction  8. Normal right atrium.  9. Poorly imaged right ventricle.  10. RA Pressure is 10 mm Hg.  11. RVS Pressure is 29 mm Hg.  12. Not well visualized, probably normal.  13. Normal pericardium with no pericardial effusion.    < end of copied text >    [ ]  Catheterization:  [ ] Stress Test:    OTHER: 	  US carotid : < from: US Duplex Carotid Arteries Complete, Bilateral (07.07.18 @ 10:28) >    IMPRESSION:   No hemodynamically significant carotid artery stenoses.     < end of copied text >        ASSESSMENT/PLAN: 	78y Female PMH COPD and Parkinsons admitted with recurrent falls.    repeat echo pending   ABX per medicine  cont statin ,   cont all home meds for OA, steroids   no s/s of chf on exam  pain management   D/W Dr Us Subjective: pt seen and examined, NAD on exam   no events overnight  no chest pain or sob on exam     acetaminophen   Tablet. 650 milliGRAM(s) Oral every 6 hours PRN  ALBUTerol/ipratropium for Nebulization 3 milliLiter(s) Nebulizer every 6 hours  anastrozole 1 milliGRAM(s) Oral daily  bethanechol 25 milliGRAM(s) Oral two times a day  cefTRIAXone   IVPB 1 Gram(s) IV Intermittent every 24 hours  Cyanocobalamin 100 MICROGram(s) 100 MICROGram(s) Oral daily  ergocalciferol 59947 Unit(s) Oral <User Schedule>  heparin  Injectable 5000 Unit(s) SubCutaneous every 12 hours  lithium SR (LITHOBID) 300 milliGRAM(s) Oral two times a day  loratadine 10 milliGRAM(s) Oral daily  pantoprazole    Tablet 40 milliGRAM(s) Oral before breakfast  predniSONE   Tablet 50 milliGRAM(s) Oral daily  risperiDONE   Tablet 1 milliGRAM(s) Oral daily  simvastatin 40 milliGRAM(s) Oral at bedtime  trihexyphenidyl 1 milliGRAM(s) Oral three times a day                            10.6   12.5  )-----------( 252      ( 08 Jul 2018 05:54 )             34.0       Hemoglobin: 10.6 g/dL (07-08 @ 05:54)  Hemoglobin: 10.3 g/dL (07-07 @ 08:02)  Hemoglobin: 11.8 g/dL (07-06 @ 12:08)      07-08    140  |  107  |  31<H>  ----------------------------<  106<H>  4.1   |  26  |  1.46<H>    Ca    10.2      08 Jul 2018 05:54      Creatinine Trend: 1.46<--, 1.33<--    COAGS:     CARDIAC MARKERS ( 06 Jul 2018 12:09 )  x     / x     / 51 U/L / x     / x      CARDIAC MARKERS ( 06 Jul 2018 12:08 )  <0.015 ng/mL / x     / x     / x     / x            T(C): 36.5 (07-08-18 @ 20:34), Max: 37.1 (07-08-18 @ 14:16)  HR: 84 (07-08-18 @ 20:34) (72 - 90)  BP: 111/57 (07-08-18 @ 20:34) (111/57 - 135/56)  RR: 14 (07-08-18 @ 20:34) (14 - 16)  SpO2: 95% (07-08-18 @ 20:34) (95% - 100%)  Wt(kg): --    I&O's Summary      Appearance: Normal	  HEENT:   Normal oral mucosa, PERRL, EOMI	  Lymphatic: No lymphadenopathy , no edema  Cardiovascular: Normal S1 S2, No JVD, No murmurs , Peripheral pulses palpable 2+ bilaterally  Respiratory: Lungs clear to auscultation, normal effort 	  Gastrointestinal:  Soft, Non-tender, + BS	  Skin: No rashes, No ecchymoses, No cyanosis, warm to touch  Musculoskeletal: Normal range of motion, normal strength  Psychiatry:  Mood & affect appropriate    TELEMETRY: 	none       DIAGNOSTIC TESTING:  [ ] Echocardiogram: < from: Transthoracic Echocardiogram (01.17.17 @ 07:05) >  ------------------------------------------------------------------------  CONCLUSIONS:  1. Normal mitral valve. Mild mitral regurgitation.  2. Normal trileaflet aortic valve.  3. Normal aortic root.  4. Normal left atrium.  5. Normal left ventricular internal dimensions and wall  thicknesses.  6. Normal Left Ventricular Systolic Function,  (EF = 55 to  60%)  7. Grade II diastolic dysfunction  8. Normal right atrium.  9. Poorly imaged right ventricle.  10. RA Pressure is 10 mm Hg.  11. RVS Pressure is 29 mm Hg.  12. Not well visualized, probably normal.  13. Normal pericardium with no pericardial effusion.    < end of copied text >    [ ]  Catheterization:  [ ] Stress Test:    OTHER: 	  US carotid : < from: US Duplex Carotid Arteries Complete, Bilateral (07.07.18 @ 10:28) >    IMPRESSION:   No hemodynamically significant carotid artery stenoses.     < end of copied text >        ASSESSMENT/PLAN: 	78y Female PMH COPD and Parkinsons admitted with recurrent falls.    repeat echo pending   ABX per medicine  cont statin ,   cont all home meds for OA, steroids   no s/s of chf on exam  pain management   D/W Dr Huffman

## 2018-07-09 NOTE — DISCHARGE NOTE ADULT - CARE PROVIDER_API CALL
Héctor Lares (MD), Internal Medicine  [address] [place] [address]  Phone: [phone]  Fax: [phone] Héctor Lares), Internal Medicine  [address]  [place] [address]  Phone: [phone]  Fax: [phone]    Valorie Barros), EndocrinologyMetabDiabetes; Internal Medicine  [address] [place] [id number]  Phone: [phone]  Fax: [phone]    Nikole Farfan), Neurology  [address] [place] [address]  [address] [place] [address] [place] [address]  Phone: [phone]  Fax: [phone]

## 2018-07-09 NOTE — DISCHARGE NOTE ADULT - PATIENT PORTAL LINK FT
You can access the [facility], offered by [facility], by registering with the following website: [email or web address]

## 2018-07-09 NOTE — DISCHARGE NOTE ADULT - ADDITIONAL INSTRUCTIONS
follow up Lithium levels   PMD in 1 week at assistant [facility]. Your sodium level was 147 on 7/16/18, which after testing and nephrologist consultation, was attributed to dehydration.  You were given IV fluid, and it is strongly encouraged to drink plenty of fluids.  Repeat sodium level in three days. Your sodium level was 147 on 7/16/18, which after testing and nephrologist consultation, was attributed to dehydration.  You were given IV fluid, and it is strongly encouraged to drink plenty of fluids.  Repeat sodium level in three days.    Prior meds that patient was on in assisted living facility, that are not on the discharge instructions, are to be discontinued. Your sodium level was 147 on 7/16/18, which after testing and nephrologist consultation, was attributed to dehydration.  You were given IV fluid, and it is strongly encouraged to drink plenty of fluids.  Repeat sodium level in three days. Covering for Dr. Flannery. Patient to follow up with pmd and endocrinologist as out patient.    Prior meds that patient was on in assisted living facility, that are not on the discharge instructions, are to be discontinued.

## 2018-07-09 NOTE — DISCHARGE NOTE ADULT - MEDICATION SUMMARY - MEDICATIONS TO STOP TAKING
I will STOP taking the medications listed below when I get home from the hospital:    Tamiflu 75 mg oral capsule  -- 1 cap(s) by mouth 2 times a day   -- Check with your doctor before becoming pregnant.  Finish all this medication unless otherwise directed by prescriber. I will STOP taking the medications listed below when I get home from the hospital:    risperiDONE 1 mg oral tablet  -- 1 tab(s) by mouth once a day    lithium 300 mg oral tablet, extended release  -- 1 tab(s) by mouth 2 times a day    Tamiflu 75 mg oral capsule  -- 1 cap(s) by mouth 2 times a day   -- Check with your doctor before becoming pregnant.  Finish all this medication unless otherwise directed by prescriber.    predniSONE 50 mg oral tablet  -- 1 tab(s) by mouth once a day   -- It is very important that you take or use this exactly as directed.  Do not skip doses or discontinue unless directed by your doctor.  Obtain medical advice before taking any non-prescription drugs as some may affect the action of this medication.  Take with food or milk.

## 2018-07-09 NOTE — PROGRESS NOTE ADULT - SUBJECTIVE AND OBJECTIVE BOX
INTERVAL HPI/OVERNIGHT EVENTS: Seen and examined with sister HCP in room. Said ,I knew they will not send her over the weekend. Please discharge her . She has doctor rounding two times a week and nurse. She gets her Lithium level checked once a week . She has chronic kidney problem. Patient said ,I walked yesterday fine so want to go back.   Vital Signs Last 24 Hrs  T(C): 36.3 (09 Jul 2018 04:55), Max: 37.1 (08 Jul 2018 14:16)  T(F): 97.3 (09 Jul 2018 04:55), Max: 98.8 (08 Jul 2018 14:16)  HR: 73 (09 Jul 2018 04:55) (73 - 90)  BP: 144/78 (09 Jul 2018 04:55) (111/57 - 144/78)  BP(mean): --  RR: 16 (09 Jul 2018 04:55) (14 - 16)  SpO2: 97% (09 Jul 2018 04:55) (95% - 97%)  I&O's Summary    MEDICATIONS  (STANDING):  ALBUTerol/ipratropium for Nebulization 3 milliLiter(s) Nebulizer every 6 hours  anastrozole 1 milliGRAM(s) Oral daily  bethanechol 25 milliGRAM(s) Oral two times a day  cefTRIAXone   IVPB 1 Gram(s) IV Intermittent every 24 hours  Cyanocobalamin 100 MICROGram(s) 100 MICROGram(s) Oral daily  ergocalciferol 54220 Unit(s) Oral <User Schedule>  heparin  Injectable 5000 Unit(s) SubCutaneous every 12 hours  lithium SR (LITHOBID) 300 milliGRAM(s) Oral two times a day  loratadine 10 milliGRAM(s) Oral daily  pantoprazole    Tablet 40 milliGRAM(s) Oral before breakfast  predniSONE   Tablet 50 milliGRAM(s) Oral daily  risperiDONE   Tablet 1 milliGRAM(s) Oral daily  simvastatin 40 milliGRAM(s) Oral at bedtime  sodium chloride 0.45%. 1000 milliLiter(s) (100 mL/Hr) IV Continuous <Continuous>  trihexyphenidyl 1 milliGRAM(s) Oral three times a day    MEDICATIONS  (PRN):  acetaminophen   Tablet. 650 milliGRAM(s) Oral every 6 hours PRN Moderate Pain (4 - 6)    LABS:                        10.6   12.5  )-----------( 252      ( 08 Jul 2018 05:54 )             34.0     07-08    140  |  107  |  31<H>  ----------------------------<  106<H>  4.1   |  26  |  1.46<H>    Ca    10.2      08 Jul 2018 05:54          CAPILLARY BLOOD GLUCOSE              REVIEW OF SYSTEMS:  CONSTITUTIONAL: No fever, weight loss, or fatigue  EYES: No eye pain, visual disturbances, or discharge  ENMT:  No difficulty hearing, tinnitus, vertigo; No sinus or throat pain  NECK: No pain or stiffness  BREASTS: No pain, masses, or nipple discharge  RESPIRATORY: No cough, wheezing, chills or hemoptysis; No shortness of breath  CARDIOVASCULAR: No chest pain, palpitations, dizziness, or leg swelling  GASTROINTESTINAL: No abdominal or epigastric pain. No nausea, vomiting, or hematemesis; No diarrhea or constipation. No melena or hematochezia.  GENITOURINARY: No dysuria, frequency, hematuria, or incontinence  NEUROLOGICAL: No headaches, memory loss, loss of strength, numbness, or tremors  SKIN: No itching, burning, rashes, or lesions   LYMPH NODES: No enlarged glands  ENDOCRINE: No heat or cold intolerance; No hair loss  MUSCULOSKELETAL: No joint pain or swelling; No muscle, back, or extremity pain    Consultant(s) Notes Reviewed:  [x ] YES  [ ] NO    PHYSICAL EXAM:  GENERAL: NAD, not in any distress ,  HEAD:  Atraumatic, Normocephalic  EYES: EOMI, PERRLA, conjunctiva and sclera clear, laceration left eyebrow area+  ENMT: No tonsillar erythema, exudates, or enlargement; Moist mucous membranes, Good dentition, No lesions  NECK: Supple, No JVD, Normal thyroid  NERVOUS SYSTEM:  Alert & Oriented X3, No focal deficit   CHEST/LUNG: Good air entry bilateral with no  rales, rhonchi, wheezing, or rubs  HEART: Regular rate and rhythm; No murmurs, rubs, or gallops  ABDOMEN: Soft, Nontender, Nondistended; Bowel sounds present  EXTREMITIES:  2+ Peripheral Pulses, No clubbing, cyanosis, or edema  RT knee Crepitus +    Care Discussed with Consultants/Other Providers [ x] YES  [ ] NO

## 2018-07-09 NOTE — CHART NOTE - NSCHARTNOTEFT_GEN_A_CORE
WBC 14.6, BC X 2 negative to date, and UC negative.  Cr 1.49 (1.46).  Attending contacted.  Discharge cancelled. WBC 14.6, BC X 2 negative to date, and UC negative.  Cr 1.49 (1.46).   Dr. Fairbanks, ID, called and Dr. Tellez, renal, called to consult.  Urine lytes and Renal US ordered. Attending contacted.  Discharge cancelled.

## 2018-07-09 NOTE — DISCHARGE NOTE ADULT - MEDICATION SUMMARY - MEDICATIONS TO TAKE
I will START or STAY ON the medications listed below when I get home from the hospital:    predniSONE 50 mg oral tablet  -- 1 tab(s) by mouth once a day  -- Indication: For Copd    loratadine 10 mg oral tablet  -- 1 tab(s) by mouth once a day  -- Indication: For Antihistimine    simvastatin 40 mg oral tablet  -- 1 tab(s) by mouth once a day (at bedtime)  -- Indication: For HLD (hyperlipidemia)    anastrozole 1 mg oral tablet  -- 1 tab(s) by mouth once a day  -- Indication: For Antineoplastics    trihexyphenidyl 2 mg oral tablet  -- 0.5 tab(s) by mouth 3 times a day  -- Indication: For Parkinson's disease    lithium 300 mg oral tablet, extended release  -- 1 tab(s) by mouth 2 times a day  -- Indication: For Bipolar 1 disorder    risperiDONE 1 mg oral tablet  -- 1 tab(s) by mouth 2 times a day  -- Indication: For Bipolar 1 disorder    ipratropium-albuterol 0.5 mg-2.5 mg/3 mLinhalation solution  -- 3 milliliter(s) inhaled 4 times a day  -- Indication: For Copd    bethanechol 25 mg oral tablet  -- 1 tab(s) by mouth 2 times a day  -- Indication: For     omeprazole 20 mg oral delayed release capsule  -- 1 cap(s) by mouth once a day  -- Indication: For GI ppx    cyanocobalamin 100 mcg oral tablet  -- 1 tab(s) by mouth once a day  -- Indication: For Vit B 12 defeic I will START or STAY ON the medications listed below when I get home from the hospital:    loratadine 10 mg oral tablet  -- 1 tab(s) by mouth once a day  -- Indication: For Antihistimine    simvastatin 40 mg oral tablet  -- 1 tab(s) by mouth once a day (at bedtime)  -- Indication: For HLD (hyperlipidemia)    anastrozole 1 mg oral tablet  -- 1 tab(s) by mouth once a day  -- Indication: For Antineoplastics    trihexyphenidyl 2 mg oral tablet  -- 0.5 tab(s) by mouth 3 times a day  -- Indication: For Parkinson's disease    lithium 150 mg oral capsule  -- 1 cap(s) by mouth 2 times a day  -- Indication: For Bipolar 1 disorder    ipratropium-albuterol 0.5 mg-2.5 mg/3 mLinhalation solution  -- 3 milliliter(s) inhaled 4 times a day  -- Indication: For Copd    cinacalcet 30 mg oral tablet  -- 1 tab(s) by mouth 2 times a day  -- Indication: For Calcimimetics    bethanechol 25 mg oral tablet  -- 1 tab(s) by mouth 2 times a day  -- Indication: For     omeprazole 20 mg oral delayed release capsule  -- 1 cap(s) by mouth once a day  -- Indication: For GI ppx    cyanocobalamin 100 mcg oral tablet  -- 1 tab(s) by mouth once a day  -- Indication: For Vit B 12 defeic I will START or STAY ON the medications listed below when I get home from the hospital:    loratadine 10 mg oral tablet  -- 1 tab(s) by mouth once a day  -- Indication: For Antihistimine    simvastatin 40 mg oral tablet  -- 1 tab(s) by mouth once a day (at bedtime)  -- Indication: For HLD (hyperlipidemia)    anastrozole 1 mg oral tablet  -- 1 tab(s) by mouth once a day  -- Indication: For Antineoplastics    trihexyphenidyl 2 mg oral tablet  -- 0.5 tab(s) by mouth 3 times a day  -- Indication: For Parkinson's disease    lithium 150 mg oral capsule  -- 1 cap(s) by mouth 2 times a day  -- Indication: For Bipolar 1 disorder    ipratropium-albuterol 0.5 mg-2.5 mg/3 mLinhalation solution  -- 3 milliliter(s) inhaled 4 times a day  -- Indication: For Copd    cinacalcet 30 mg oral tablet  -- 1 tab(s) by mouth once a day  -- Indication: For Hypercalcemia    bethanechol 25 mg oral tablet  -- 1 tab(s) by mouth 2 times a day  -- Indication: For     omeprazole 20 mg oral delayed release capsule  -- 1 cap(s) by mouth once a day  -- Indication: For GI ppx    cyanocobalamin 100 mcg oral tablet  -- 1 tab(s) by mouth once a day  -- Indication: For Vit B 12 defeic

## 2018-07-10 DIAGNOSIS — N17.9 ACUTE KIDNEY FAILURE, UNSPECIFIED: ICD-10-CM

## 2018-07-10 DIAGNOSIS — D72.829 ELEVATED WHITE BLOOD CELL COUNT, UNSPECIFIED: ICD-10-CM

## 2018-07-10 DIAGNOSIS — R41.82 ALTERED MENTAL STATUS, UNSPECIFIED: ICD-10-CM

## 2018-07-10 LAB
ANION GAP SERPL CALC-SCNC: 5 MMOL/L — SIGNIFICANT CHANGE UP (ref 5–17)
BUN SERPL-MCNC: 40 MG/DL — HIGH (ref 7–18)
CALCIUM SERPL-MCNC: 11 MG/DL — HIGH (ref 8.4–10.5)
CHLORIDE SERPL-SCNC: 112 MMOL/L — HIGH (ref 96–108)
CHLORIDE UR-SCNC: 72 MMOL/L — SIGNIFICANT CHANGE UP (ref 55–125)
CO2 SERPL-SCNC: 28 MMOL/L — SIGNIFICANT CHANGE UP (ref 22–31)
CREAT ?TM UR-MCNC: <13 MG/DL — SIGNIFICANT CHANGE UP
CREAT SERPL-MCNC: 1.59 MG/DL — HIGH (ref 0.5–1.3)
GLUCOSE SERPL-MCNC: 106 MG/DL — HIGH (ref 70–99)
HCT VFR BLD CALC: 32.2 % — LOW (ref 34.5–45)
HGB BLD-MCNC: 10.1 G/DL — LOW (ref 11.5–15.5)
INR BLD: 0.93 RATIO — SIGNIFICANT CHANGE UP (ref 0.88–1.16)
LITHIUM SERPL-MCNC: 1.7 MMOL/L — CRITICAL HIGH (ref 0.6–1.2)
MCHC RBC-ENTMCNC: 29.2 PG — SIGNIFICANT CHANGE UP (ref 27–34)
MCHC RBC-ENTMCNC: 31.4 GM/DL — LOW (ref 32–36)
MCV RBC AUTO: 93.1 FL — SIGNIFICANT CHANGE UP (ref 80–100)
OSMOLALITY UR: 232 MOS/KG — SIGNIFICANT CHANGE UP (ref 50–1200)
PLATELET # BLD AUTO: 281 K/UL — SIGNIFICANT CHANGE UP (ref 150–400)
POTASSIUM SERPL-MCNC: 4.1 MMOL/L — SIGNIFICANT CHANGE UP (ref 3.5–5.3)
POTASSIUM SERPL-SCNC: 4.1 MMOL/L — SIGNIFICANT CHANGE UP (ref 3.5–5.3)
POTASSIUM UR-SCNC: 8 MMOL/L — LOW (ref 25–125)
PROTHROM AB SERPL-ACNC: 10.1 SEC — SIGNIFICANT CHANGE UP (ref 9.8–12.7)
RBC # BLD: 3.45 M/UL — LOW (ref 3.8–5.2)
RBC # FLD: 14.2 % — SIGNIFICANT CHANGE UP (ref 10.3–14.5)
SODIUM SERPL-SCNC: 145 MMOL/L — SIGNIFICANT CHANGE UP (ref 135–145)
SODIUM UR-SCNC: 72 MMOL/L — SIGNIFICANT CHANGE UP (ref 40–220)
WBC # BLD: 14.4 K/UL — HIGH (ref 3.8–10.5)
WBC # FLD AUTO: 14.4 K/UL — HIGH (ref 3.8–10.5)

## 2018-07-10 RX ORDER — LANOLIN ALCOHOL/MO/W.PET/CERES
3 CREAM (GRAM) TOPICAL ONCE
Qty: 0 | Refills: 0 | Status: COMPLETED | OUTPATIENT
Start: 2018-07-10 | End: 2018-07-10

## 2018-07-10 RX ORDER — SODIUM CHLORIDE 9 MG/ML
1000 INJECTION, SOLUTION INTRAVENOUS
Qty: 0 | Refills: 0 | Status: DISCONTINUED | OUTPATIENT
Start: 2018-07-10 | End: 2018-07-11

## 2018-07-10 RX ADMIN — Medication 1 MILLIGRAM(S): at 22:10

## 2018-07-10 RX ADMIN — PANTOPRAZOLE SODIUM 40 MILLIGRAM(S): 20 TABLET, DELAYED RELEASE ORAL at 05:58

## 2018-07-10 RX ADMIN — Medication 3 MILLILITER(S): at 14:27

## 2018-07-10 RX ADMIN — Medication 3 MILLIGRAM(S): at 00:40

## 2018-07-10 RX ADMIN — SODIUM CHLORIDE 100 MILLILITER(S): 9 INJECTION, SOLUTION INTRAVENOUS at 00:40

## 2018-07-10 RX ADMIN — Medication 50 MILLIGRAM(S): at 05:53

## 2018-07-10 RX ADMIN — RISPERIDONE 1 MILLIGRAM(S): 4 TABLET ORAL at 11:50

## 2018-07-10 RX ADMIN — HEPARIN SODIUM 5000 UNIT(S): 5000 INJECTION INTRAVENOUS; SUBCUTANEOUS at 05:54

## 2018-07-10 RX ADMIN — CEFTRIAXONE 100 GRAM(S): 500 INJECTION, POWDER, FOR SOLUTION INTRAMUSCULAR; INTRAVENOUS at 18:32

## 2018-07-10 RX ADMIN — LORATADINE 10 MILLIGRAM(S): 10 TABLET ORAL at 11:50

## 2018-07-10 RX ADMIN — Medication 3 MILLILITER(S): at 09:11

## 2018-07-10 RX ADMIN — ANASTROZOLE 1 MILLIGRAM(S): 1 TABLET ORAL at 11:50

## 2018-07-10 RX ADMIN — Medication 1 MILLIGRAM(S): at 05:53

## 2018-07-10 RX ADMIN — LITHIUM CARBONATE 300 MILLIGRAM(S): 300 TABLET, EXTENDED RELEASE ORAL at 05:55

## 2018-07-10 RX ADMIN — Medication 25 MILLIGRAM(S): at 05:54

## 2018-07-10 RX ADMIN — SIMVASTATIN 40 MILLIGRAM(S): 20 TABLET, FILM COATED ORAL at 22:16

## 2018-07-10 RX ADMIN — Medication 1 MILLIGRAM(S): at 11:53

## 2018-07-10 RX ADMIN — Medication 3 MILLILITER(S): at 20:32

## 2018-07-10 NOTE — PROGRESS NOTE ADULT - PROBLEM SELECTOR PLAN 3
Stat lithium level ordered  CT brain ordered Stat lithium level and elevated at 1.7  Lithium discontinued.  Repeat level as indicated to safely restart  CT brain ordered

## 2018-07-10 NOTE — CONSULT NOTE ADULT - ASSESSMENT
78 yr old female with bipolar disorder, parkinsons disease, and CKD  baseline scr 1.1-1.3. Sent to ED after unwitnessed fall. Sustained laceration to the face. work-up showed UTI ( with leucouria and positive leucocyte esterase). SCR noted to be elevated from baseline to 1.59 prompting a renal consult. She has urinary incontinence.  Pt is a poor historian.    REC'S  DECR IVF RATE TO 60CC/HR  CXR PENDING  RENAL SONO  CONT ABX  CONT TO AVOID NEPHROTOXINS INCLUDING IV CONTRAST AND NSAIDS  RPT RENAL PROFILE IN AM  MANY THANKS FOR THE CONSULT

## 2018-07-10 NOTE — CONSULT NOTE ADULT - SUBJECTIVE AND OBJECTIVE BOX
78 yr old female with bipolar disorder, parkinsons disease, and CKD  baseline scr 1.1-1.3. Sent to ED after unwitnessed fall. Sustained laceration to the face. work-up showed UTI ( with leucouria and positive leucocyte esterase). SCR noted to be elevated from baseline to 1.59 prompting a renal consult. She has urinary incontinence.  Pt is a poor historian.    PAST MEDICAL & SURGICAL HISTORY:  HLD (hyperlipidemia)  Parkinson's disease  Bipolar 1 disorder  COPD (chronic obstructive pulmonary disease)  No significant past surgical history    codeine (Unknown)  Thorazine (Unknown)    Home Medications Reviewed  Hospital Medications:   MEDICATIONS  (STANDING):  ALBUTerol/ipratropium for Nebulization 3 milliLiter(s) Nebulizer every 6 hours  anastrozole 1 milliGRAM(s) Oral daily  bethanechol 25 milliGRAM(s) Oral two times a day  cefTRIAXone   IVPB 1 Gram(s) IV Intermittent every 24 hours  Cyanocobalamin 100 MICROGram(s) 100 MICROGram(s) Oral daily  ergocalciferol 06313 Unit(s) Oral <User Schedule>  heparin  Injectable 5000 Unit(s) SubCutaneous every 12 hours  lithium SR (LITHOBID) 300 milliGRAM(s) Oral two times a day  loratadine 10 milliGRAM(s) Oral daily  pantoprazole    Tablet 40 milliGRAM(s) Oral before breakfast  predniSONE   Tablet 50 milliGRAM(s) Oral daily  risperiDONE   Tablet 1 milliGRAM(s) Oral daily  simvastatin 40 milliGRAM(s) Oral at bedtime  sodium chloride 0.45%. 1000 milliLiter(s) (100 mL/Hr) IV Continuous <Continuous>  trihexyphenidyl 1 milliGRAM(s) Oral three times a day    SOCIAL HISTORY:  Denies ETOh,Smoking,   FAMILY HISTORY:  No pertinent family history in first degree relatives        VITALS:  T(F): 97.2 (07-10-18 @ 05:11), Max: 98.4 (18 @ 20:28)  HR: 51 (07-10-18 @ 06:54)  BP: 150/62 (07-10-18 @ 06:54)  RR: 18 (07-10-18 @ 05:11)  SpO2: 98% (07-10-18 @ 05:11)  Wt(kg): --     @ 07:01  -  07-10 @ 07:00  --------------------------------------------------------  IN: 500 mL / OUT: 0 mL / NET: 500 mL        PHYSICAL EXAM:  Constitutional: NAD  HEENT: anicteric sclera, oropharynx clear.  Neck: No JVD  Respiratory: CTAB, no wheezes, rales or rhonchi  Cardiovascular: S1, S2, RRR  Gastrointestinal: BS+, soft, NT/ND  Extremities: No cyanosis or clubbing. No peripheral edema  Neurological: A/O x 3, no focal deficits  Psychiatric: Normal mood, normal affect  : No CVA tenderness. No crisostomo.       LABS:  07-10    145  |  112<H>  |  40<H>  ----------------------------<  106<H>  4.1   |  28  |  1.59<H>    Ca    11.0<H>      10 Jul 2018 07:19      Creatinine Trend: 1.59 <--, 1.49 <--, 1.46 <--, 1.33 <--                        10.1   14.4  )-----------( 281      ( 10 Jul 2018 07:19 )             32.2     Urine Studies:  Urinalysis Basic - ( 2018 12:19 )    Color: Yellow / Appearance: Hazy / S.005 / pH:   Gluc:  / Ketone: Negative  / Bili: Negative / Urobili: Negative   Blood:  / Protein: 15 / Nitrite: Negative   Leuk Esterase: Moderate / RBC: 2-5 /HPF / WBC 26-50 /HPF   Sq Epi:  / Non Sq Epi: Occasional /HPF / Bacteria: Few /HPF        RADIOLOGY & ADDITIONAL STUDIES:

## 2018-07-10 NOTE — PROGRESS NOTE ADULT - SUBJECTIVE AND OBJECTIVE BOX
Subjective: pt seen and examined, NAD on exam , ROS- .   no events overnight    acetaminophen   Tablet. 650 milliGRAM(s) Oral every 6 hours PRN  ALBUTerol/ipratropium for Nebulization 3 milliLiter(s) Nebulizer every 6 hours  anastrozole 1 milliGRAM(s) Oral daily  bethanechol 25 milliGRAM(s) Oral two times a day  cefTRIAXone   IVPB 1 Gram(s) IV Intermittent every 24 hours  Cyanocobalamin 100 MICROGram(s) 100 MICROGram(s) Oral daily  ergocalciferol 14254 Unit(s) Oral <User Schedule>  heparin  Injectable 5000 Unit(s) SubCutaneous every 12 hours  lithium SR (LITHOBID) 300 milliGRAM(s) Oral two times a day  loratadine 10 milliGRAM(s) Oral daily  pantoprazole    Tablet 40 milliGRAM(s) Oral before breakfast  predniSONE   Tablet 50 milliGRAM(s) Oral daily  risperiDONE   Tablet 1 milliGRAM(s) Oral daily  simvastatin 40 milliGRAM(s) Oral at bedtime  sodium chloride 0.45%. 1000 milliLiter(s) IV Continuous <Continuous>  trihexyphenidyl 1 milliGRAM(s) Oral three times a day                            10.6   14.6  )-----------( 257      ( 09 Jul 2018 16:18 )             33.8       Hemoglobin: 10.6 g/dL (07-09 @ 16:18)  Hemoglobin: 10.6 g/dL (07-08 @ 05:54)  Hemoglobin: 10.3 g/dL (07-07 @ 08:02)  Hemoglobin: 11.8 g/dL (07-06 @ 12:08)      07-09    141  |  109<H>  |  36<H>  ----------------------------<  116<H>  5.3   |  25  |  1.49<H>    Ca    10.3      09 Jul 2018 16:18      Creatinine Trend: 1.49<--, 1.46<--, 1.33<--    COAGS:           T(C): 36.9 (07-09-18 @ 20:28), Max: 36.9 (07-09-18 @ 20:28)  HR: 58 (07-09-18 @ 20:28) (58 - 75)  BP: 137/60 (07-09-18 @ 20:28) (129/65 - 144/78)  RR: 16 (07-09-18 @ 20:28) (16 - 16)  SpO2: 97% (07-09-18 @ 20:28) (97% - 99%)  Wt(kg): --    I&O's Summary    09 Jul 2018 07:01  -  10 Jul 2018 04:21  --------------------------------------------------------  IN: 500 mL / OUT: 0 mL / NET: 500 mL      Appearance: Normal	  HEENT:   Normal oral mucosa, PERRL, EOMI	  Lymphatic: No lymphadenopathy , no edema  Cardiovascular: Normal S1 S2, No JVD, No murmurs , Peripheral pulses palpable 2+ bilaterally  Respiratory: Lungs clear to auscultation, normal effort 	  Gastrointestinal:  Soft, Non-tender, + BS	  Skin: No rashes, No ecchymoses, No cyanosis, warm to touch  Musculoskeletal: Normal range of motion, normal strength  Psychiatry:  Mood & affect appropriate    TELEMETRY: 	none       DIAGNOSTIC TESTING:  [ ] Echocardiogram: < from: Transthoracic Echocardiogram (01.17.17 @ 07:05) >  ------------------------------------------------------------------------  CONCLUSIONS:  1. Normal mitral valve. Mild mitral regurgitation.  2. Normal trileaflet aortic valve.  3. Normal aortic root.  4. Normal left atrium.  5. Normal left ventricular internal dimensions and wall  thicknesses.  6. Normal Left Ventricular Systolic Function,  (EF = 55 to  60%)  7. Grade II diastolic dysfunction  8. Normal right atrium.  9. Poorly imaged right ventricle.  10. RA Pressure is 10 mm Hg.  11. RVS Pressure is 29 mm Hg.  12. Not well visualized, probably normal.  13. Normal pericardium with no pericardial effusion.    < end of copied text >      ECHO: < from: Transthoracic Echocardiogram (07.08.18 @ 07:55) >  CONCLUSIONS:  1. Normal mitral valve. Mild mitral regurgitation.  2. Calcified trileaflet aortic valve with normal opening.  3. Aortic Root: 2.6 cm.  4. Normal left atrium.  5. Normal left ventricular internal dimensions and wall  thicknesses.  6. Normal left ventricular function.  7. Grade II diastolic dysfunction.  8. Normal right atrium.  9. Normal right ventricular size and function.  10. RA Pressure is 10 mm Hg.  11. RV systolic pressure is 26 mm Hg.  12. There is mild tricuspid regurgitation.  13.Normal pericardium with no pericardial effusion.    < end of copied text >    [ ]  Catheterization:  [ ] Stress Test:    OTHER: 	  US carotid : < from: US Duplex Carotid Arteries Complete, Bilateral (07.07.18 @ 10:28) >    IMPRESSION:   No hemodynamically significant carotid artery stenoses.     < end of copied text >        ASSESSMENT/PLAN: 	78y Female PMH COPD and Parkinsons admitted with recurrent falls.    repeat echo noted    ABX per medicine  cont statin ,   cont all home meds for OA, steroids   no s/s of chf on exam  no further CV inpt work up needed . plan for outpt event monitor   D/W Dr Huffman

## 2018-07-10 NOTE — PROGRESS NOTE ADULT - PROBLEM SELECTOR PLAN 5
with CKD stage 3  Dr. Tellez, nephro, consulted and ordered urine lytes which  showed low potassium   Renal US ordered/Pt refused 2/2 to AMS  lithium level ordered with CKD stage 3  Dr. Tellez, nephro, consulted and ordered urine lytes which  showed low potassium   Renal US ordered/Pt refused 2/2 to AMS  lithium level elevated at 1.7  Discontinued lithium  Reorder level, as indicated to safely restart

## 2018-07-10 NOTE — CONSULT NOTE ADULT - SUBJECTIVE AND OBJECTIVE BOX
NOT COMPLETE       HPI:  ID consult was called to evaluate this 77 y/o female from home for increasing leukocytosis. Has pmhx significant for COPD, recent falls, bipolar disorder and Parkinson's. Presented to ED on 7/6 after sustaining unwitnessed fall. Workup revealed no significant fx's or soft tissue injury. U/A was somewhat suspicious so patient was started on rocephin 5 days ago. UC was negative. Wbc count has now been increasing, so ID ask to evaluate.  BC have been negative. Patient is also chronic prednisone therapy for COPD.     As per H&P:  78Y f, who walks with walker, with PMHx of COPD, Bipolar disorder, Parkinsonism, was brought to ER by EMS for unwitnessed fall in morning (07-). Pt was trying to  her phone when she tripped and experienced a forward fall in which she hit her L side of face. Pt did not experience any light handedness dizziness, vertigo, head spining nausea, vomiting, cp, palpitation or loss of consciousness. Pt had laceration 4cm laceration just above left eyelid which was closed by steri tape. She complain of right knee pain which is constant and 10/10 in intensity and aggravates the movement according to her. pt also complains of R sided abdominal pain which is 3/10 in intensity. She had a fall last week and twice she fell from her bed since last week. She lives alone and does not have any help or assistance at home. (06 Jul 2018 23:29)    REVIEW OF SYSTEMS:  [  ] Not able to illicit  General:	  Chest:	  GI:	  :  Skin:	  Musculoskeletal:	  Neuro:    PAST MEDICAL & SURGICAL HISTORY:  HLD (hyperlipidemia)  Parkinson's disease  Bipolar 1 disorder  COPD (chronic obstructive pulmonary disease)  No significant past surgical history    ALLERGIES: codeine (Unknown)  Thorazine (Unknown)    MEDS:  acetaminophen   Tablet. 650 milliGRAM(s) Oral every 6 hours PRN  ALBUTerol/ipratropium for Nebulization 3 milliLiter(s) Nebulizer every 6 hours  anastrozole 1 milliGRAM(s) Oral daily  bethanechol 25 milliGRAM(s) Oral two times a day  cefTRIAXone   IVPB 1 Gram(s) IV Intermittent every 24 hours  Cyanocobalamin 100 MICROGram(s) 100 MICROGram(s) Oral daily  ergocalciferol 93675 Unit(s) Oral <User Schedule>  heparin  Injectable 5000 Unit(s) SubCutaneous every 12 hours  lithium SR (LITHOBID) 300 milliGRAM(s) Oral two times a day  loratadine 10 milliGRAM(s) Oral daily  pantoprazole    Tablet 40 milliGRAM(s) Oral before breakfast  predniSONE   Tablet 50 milliGRAM(s) Oral daily  risperiDONE   Tablet 1 milliGRAM(s) Oral daily  simvastatin 40 milliGRAM(s) Oral at bedtime  sodium chloride 0.45%. 1000 milliLiter(s) IV Continuous <Continuous>  trihexyphenidyl 1 milliGRAM(s) Oral three times a day    SOCIAL HISTORY:  Smoker:  former smoker    FAMILY HISTORY:  No pertinent family history in first degree relatives    VITALS:  Vital Signs Last 24 Hrs  T(C): 36.2 (10 Jul 2018 05:11), Max: 36.9 (09 Jul 2018 20:28)  T(F): 97.2 (10 Jul 2018 05:11), Max: 98.4 (09 Jul 2018 20:28)  HR: 51 (10 Jul 2018 06:54) (50 - 75)  BP: 150/62 (10 Jul 2018 06:54) (129/65 - 150/62)  BP(mean): --  RR: 18 (10 Jul 2018 05:11) (16 - 18)  SpO2: 98% (10 Jul 2018 05:11) (97% - 99%)      PHYSICAL EXAM:  Constitutional:  HEENT:  Neck:  Respiratory:  Cardiovascular:  Gastrointestinal:  Extremities:  Skin:  Ortho:  Neuro:      LABS/DIAGNOSTIC TESTS:                        10.1   14.4  )-----------( 281      ( 10 Jul 2018 07:19 )             32.2     WBC Count: 14.4 K/uL (07-10 @ 07:19)  WBC Count: 14.6 K/uL (07-09 @ 16:18)  WBC Count: 12.5 K/uL (07-08 @ 05:54)    07-10    145  |  112<H>  |  40<H>  ----------------------------<  106<H>  4.1   |  28  |  1.59<H>    Ca    11.0<H>      10 Jul 2018 07:19    PT/INR - ( 10 Jul 2018 07:19 )   PT: 10.1 sec;   INR: 0.93 ratio          CULTURES:   .Urine Clean Catch (Midstream)  07-08 @ 12:53   <10,000 CFU/ml  Normal Urogenital jamee present  --  --      .Blood Blood  07-06 @ 23:40   No growth to date.  --  --      .Blood Blood  07-06 @ 23:37   No growth to date.  --  --        RADIOLOGY:  7/10 renal sono - ordered    EXAM:  XR CHEST PORTABLE URGENT 1V                        PROCEDURE DATE:  07/06/2018    INTERPRETATION:  CLINICAL STATEMENT: Chest Pain.    TECHNIQUE: AP view of the chest.    COMPARISON: 2/9/2018    FINDINGS/  IMPRESSION:  Elevation right hemidiaphragm.    Scoliosis. Haziness left lung base could be related to overlying soft   tissues. No gross consolidation or significant pleural effusion.    Heart size cannot be accurately assessed in this projection.    Chronic rib deformities NOT COMPLETE       HPI:  ID consult was called to evaluate this 79 y/o female from home for increasing leukocytosis. Has pmhx significant for COPD, recent falls, bipolar disorder and Parkinson's. Presented to ED on 7/6 after sustaining unwitnessed fall. Workup revealed no significant fx's or soft tissue injury. U/A was somewhat suspicious so patient was started on rocephin 5 days ago. UC was negative. Wbc count has now been increasing, so ID ask to evaluate.  BC have been negative. Patient is also chronic prednisone therapy for COPD. At present, patient is laying flat on bed, as per her preference, and appears lethargic and has rhoncherous sounds. Not able to get renal sono since too combative. Repeat CXR was ordered by primary team to evaluate for possible pneumonia.     As per H&P:  78Y f, who walks with walker, with PMHx of COPD, Bipolar disorder, Parkinsonism, was brought to ER by EMS for unwitnessed fall in morning (07-). Pt was trying to  her phone when she tripped and experienced a forward fall in which she hit her L side of face. Pt did not experience any light handedness dizziness, vertigo, head spining nausea, vomiting, cp, palpitation or loss of consciousness. Pt had laceration 4cm laceration just above left eyelid which was closed by steri tape. She complain of right knee pain which is constant and 10/10 in intensity and aggravates the movement according to her. pt also complains of R sided abdominal pain which is 3/10 in intensity. She had a fall last week and twice she fell from her bed since last week. She lives alone and does not have any help or assistance at home. (06 Jul 2018 23:29)    REVIEW OF SYSTEMS:  [ X ] Not able to illicit    PAST MEDICAL & SURGICAL HISTORY:  HLD (hyperlipidemia)  Parkinson's disease  Bipolar 1 disorder  COPD (chronic obstructive pulmonary disease)  No significant past surgical history    ALLERGIES: codeine (Unknown)  Thorazine (Unknown)    MEDS:  acetaminophen   Tablet. 650 milliGRAM(s) Oral every 6 hours PRN  ALBUTerol/ipratropium for Nebulization 3 milliLiter(s) Nebulizer every 6 hours  anastrozole 1 milliGRAM(s) Oral daily  bethanechol 25 milliGRAM(s) Oral two times a day  cefTRIAXone   IVPB 1 Gram(s) IV Intermittent every 24 hours  Cyanocobalamin 100 MICROGram(s) 100 MICROGram(s) Oral daily  ergocalciferol 43747 Unit(s) Oral <User Schedule>  heparin  Injectable 5000 Unit(s) SubCutaneous every 12 hours  lithium SR (LITHOBID) 300 milliGRAM(s) Oral two times a day  loratadine 10 milliGRAM(s) Oral daily  pantoprazole    Tablet 40 milliGRAM(s) Oral before breakfast  predniSONE   Tablet 50 milliGRAM(s) Oral daily  risperiDONE   Tablet 1 milliGRAM(s) Oral daily  simvastatin 40 milliGRAM(s) Oral at bedtime  sodium chloride 0.45%. 1000 milliLiter(s) IV Continuous <Continuous>  trihexyphenidyl 1 milliGRAM(s) Oral three times a day    SOCIAL HISTORY:  Smoker:  former smoker    FAMILY HISTORY:  No pertinent family history in first degree relatives    VITALS:  Vital Signs Last 24 Hrs  T(C): 36.2 (10 Jul 2018 05:11), Max: 36.9 (09 Jul 2018 20:28)  T(F): 97.2 (10 Jul 2018 05:11), Max: 98.4 (09 Jul 2018 20:28)  HR: 51 (10 Jul 2018 06:54) (50 - 75)  BP: 150/62 (10 Jul 2018 06:54) (129/65 - 150/62)  BP(mean): --  RR: 18 (10 Jul 2018 05:11) (16 - 18)  SpO2: 98% (10 Jul 2018 05:11) (97% - 99%)      PHYSICAL EXAM:  Constitutional:  HEENT: normocephalic with moist oral mucosa  Neck: supple no LN's no JVD  Respiratory: lungs clear no rales no rhonchi  Cardiovascular: S1 S2 reg no murmurs  Gastrointestinal: +BS with soft, nondistended abdomen; nontender  Extremities: no edema no cyanosis  Skin: no rashes  Ortho: no jt swelling  Neuro: AAO x        LABS/DIAGNOSTIC TESTS:                        10.1   14.4  )-----------( 281      ( 10 Jul 2018 07:19 )             32.2     WBC Count: 14.4 K/uL (07-10 @ 07:19)  WBC Count: 14.6 K/uL (07-09 @ 16:18)  WBC Count: 12.5 K/uL (07-08 @ 05:54)    07-10    145  |  112<H>  |  40<H>  ----------------------------<  106<H>  4.1   |  28  |  1.59<H>    Ca    11.0<H>      10 Jul 2018 07:19    PT/INR - ( 10 Jul 2018 07:19 )   PT: 10.1 sec;   INR: 0.93 ratio          CULTURES:   .Urine Clean Catch (Midstream)  07-08 @ 12:53   <10,000 CFU/ml  Normal Urogenital jamee present  --  --      .Blood Blood  07-06 @ 23:40   No growth to date.  --  --      .Blood Blood  07-06 @ 23:37   No growth to date.  --  --        RADIOLOGY:  7/10 renal sono - ordered    EXAM:  XR CHEST PORTABLE URGENT 1V                        PROCEDURE DATE:  07/06/2018    INTERPRETATION:  CLINICAL STATEMENT: Chest Pain.    TECHNIQUE: AP view of the chest.    COMPARISON: 2/9/2018    FINDINGS/  IMPRESSION:  Elevation right hemidiaphragm.    Scoliosis. Haziness left lung base could be related to overlying soft   tissues. No gross consolidation or significant pleural effusion.    Heart size cannot be accurately assessed in this projection.    Chronic rib deformities HPI:  ID consult was called to evaluate this 77 y/o female from home for increasing leukocytosis. Has pmhx significant for COPD, recent falls, bipolar disorder and Parkinson's. Presented to ED on 7/6 after sustaining unwitnessed fall. Workup revealed no significant fx's or soft tissue injury. U/A was somewhat suspicious so patient was started on rocephin 5 days ago. UC was negative. Wbc count has now been increasing, so ID ask to evaluate.  BC have been negative. Patient is also chronic prednisone therapy for COPD. At present, patient is laying flat on bed, as per her preference, and appears lethargic and has rhoncherous sounds. Not able to get renal sono since too combative. Repeat CXR was ordered by primary team to evaluate for possible pneumonia.     As per H&P:  78Y f, who walks with walker, with PMHx of COPD, Bipolar disorder, Parkinsonism, was brought to ER by EMS for unwitnessed fall in morning (07-). Pt was trying to  her phone when she tripped and experienced a forward fall in which she hit her L side of face. Pt did not experience any light handedness dizziness, vertigo, head spining nausea, vomiting, cp, palpitation or loss of consciousness. Pt had laceration 4cm laceration just above left eyelid which was closed by steri tape. She complain of right knee pain which is constant and 10/10 in intensity and aggravates the movement according to her. pt also complains of R sided abdominal pain which is 3/10 in intensity. She had a fall last week and twice she fell from her bed since last week. She lives alone and does not have any help or assistance at home. (06 Jul 2018 23:29)    REVIEW OF SYSTEMS:  [ X ] Not able to illicit    PAST MEDICAL & SURGICAL HISTORY:  HLD (hyperlipidemia)  Parkinson's disease  Bipolar 1 disorder  COPD (chronic obstructive pulmonary disease)  No significant past surgical history    ALLERGIES: codeine (Unknown)  Thorazine (Unknown)    MEDS:  acetaminophen   Tablet. 650 milliGRAM(s) Oral every 6 hours PRN  ALBUTerol/ipratropium for Nebulization 3 milliLiter(s) Nebulizer every 6 hours  anastrozole 1 milliGRAM(s) Oral daily  bethanechol 25 milliGRAM(s) Oral two times a day  cefTRIAXone   IVPB 1 Gram(s) IV Intermittent every 24 hours  Cyanocobalamin 100 MICROGram(s) 100 MICROGram(s) Oral daily  ergocalciferol 04161 Unit(s) Oral <User Schedule>  heparin  Injectable 5000 Unit(s) SubCutaneous every 12 hours  lithium SR (LITHOBID) 300 milliGRAM(s) Oral two times a day  loratadine 10 milliGRAM(s) Oral daily  pantoprazole    Tablet 40 milliGRAM(s) Oral before breakfast  predniSONE   Tablet 50 milliGRAM(s) Oral daily  risperiDONE   Tablet 1 milliGRAM(s) Oral daily  simvastatin 40 milliGRAM(s) Oral at bedtime  sodium chloride 0.45%. 1000 milliLiter(s) IV Continuous <Continuous>  trihexyphenidyl 1 milliGRAM(s) Oral three times a day    SOCIAL HISTORY:  Smoker:  former smoker    FAMILY HISTORY:  No pertinent family history in first degree relatives    VITALS:  Vital Signs Last 24 Hrs  T(C): 36.2 (10 Jul 2018 05:11), Max: 36.9 (09 Jul 2018 20:28)  T(F): 97.2 (10 Jul 2018 05:11), Max: 98.4 (09 Jul 2018 20:28)  HR: 51 (10 Jul 2018 06:54) (50 - 75)  BP: 150/62 (10 Jul 2018 06:54) (129/65 - 150/62)  BP(mean): --  RR: 18 (10 Jul 2018 05:11) (16 - 18)  SpO2: 98% (10 Jul 2018 05:11) (97% - 99%)      PHYSICAL EXAM:  Constitutional: lethargic elderly female   HEENT: normocephalic with dry oral mucosa  Neck: supple no LN's no JVD  Respiratory: bilateral rhoncherous sounds and tachypneic  Cardiovascular: S1 S2 reg no murmurs  Gastrointestinal: +BS with soft, nondistended abdomen; nontender  +crisostomo   Extremities: mild edema of both lower legs no cyanosis  Skin: no rashes  Ortho: no jt swelling  Neuro: arousable, but lethargic       LABS/DIAGNOSTIC TESTS:                        10.1   14.4  )-----------( 281      ( 10 Jul 2018 07:19 )             32.2     WBC Count: 14.4 K/uL (07-10 @ 07:19)  WBC Count: 14.6 K/uL (07-09 @ 16:18)  WBC Count: 12.5 K/uL (07-08 @ 05:54)    07-10    145  |  112<H>  |  40<H>  ----------------------------<  106<H>  4.1   |  28  |  1.59<H>    Ca    11.0<H>      10 Jul 2018 07:19    PT/INR - ( 10 Jul 2018 07:19 )   PT: 10.1 sec;   INR: 0.93 ratio        CULTURES:   .Urine Clean Catch (Midstream)  07-08 @ 12:53   <10,000 CFU/ml  Normal Urogenital jamee present  --  --      .Blood Blood  07-06 @ 23:40   No growth to date.  --  --      .Blood Blood  07-06 @ 23:37   No growth to date.  --  --        RADIOLOGY:  7/10 CXR - ordered    EXAM:  XR CHEST PORTABLE URGENT 1V                        PROCEDURE DATE:  07/06/2018    INTERPRETATION:  CLINICAL STATEMENT: Chest Pain.    TECHNIQUE: AP view of the chest.    COMPARISON: 2/9/2018    FINDINGS/  IMPRESSION:  Elevation right hemidiaphragm.    Scoliosis. Haziness left lung base could be related to overlying soft   tissues. No gross consolidation or significant pleural effusion.    Heart size cannot be accurately assessed in this projection.    Chronic rib deformities

## 2018-07-10 NOTE — PROGRESS NOTE ADULT - SUBJECTIVE AND OBJECTIVE BOX
INTERVAL HPI/OVERNIGHT EVENTS: I feel okay .   Vital Signs Last 24 Hrs  T(C): 36.2 (10 Jul 2018 05:11), Max: 36.9 (09 Jul 2018 20:28)  T(F): 97.2 (10 Jul 2018 05:11), Max: 98.4 (09 Jul 2018 20:28)  HR: 51 (10 Jul 2018 06:54) (50 - 75)  BP: 150/62 (10 Jul 2018 06:54) (129/65 - 150/62)  BP(mean): --  RR: 18 (10 Jul 2018 05:11) (16 - 18)  SpO2: 98% (10 Jul 2018 05:11) (97% - 99%)  I&O's Summary    09 Jul 2018 07:01  -  10 Jul 2018 07:00  --------------------------------------------------------  IN: 500 mL / OUT: 0 mL / NET: 500 mL      MEDICATIONS  (STANDING):  ALBUTerol/ipratropium for Nebulization 3 milliLiter(s) Nebulizer every 6 hours  anastrozole 1 milliGRAM(s) Oral daily  bethanechol 25 milliGRAM(s) Oral two times a day  cefTRIAXone   IVPB 1 Gram(s) IV Intermittent every 24 hours  Cyanocobalamin 100 MICROGram(s) 100 MICROGram(s) Oral daily  ergocalciferol 10563 Unit(s) Oral <User Schedule>  heparin  Injectable 5000 Unit(s) SubCutaneous every 12 hours  lithium SR (LITHOBID) 300 milliGRAM(s) Oral two times a day  loratadine 10 milliGRAM(s) Oral daily  pantoprazole    Tablet 40 milliGRAM(s) Oral before breakfast  predniSONE   Tablet 50 milliGRAM(s) Oral daily  risperiDONE   Tablet 1 milliGRAM(s) Oral daily  simvastatin 40 milliGRAM(s) Oral at bedtime  sodium chloride 0.45%. 1000 milliLiter(s) (60 mL/Hr) IV Continuous <Continuous>  trihexyphenidyl 1 milliGRAM(s) Oral three times a day    MEDICATIONS  (PRN):  acetaminophen   Tablet. 650 milliGRAM(s) Oral every 6 hours PRN Moderate Pain (4 - 6)    LABS:                        10.1   14.4  )-----------( 281      ( 10 Jul 2018 07:19 )             32.2     07-10    145  |  112<H>  |  40<H>  ----------------------------<  106<H>  4.1   |  28  |  1.59<H>    Ca    11.0<H>      10 Jul 2018 07:19      PT/INR - ( 10 Jul 2018 07:19 )   PT: 10.1 sec;   INR: 0.93 ratio             CAPILLARY BLOOD GLUCOSE              REVIEW OF SYSTEMS:  CONSTITUTIONAL: No fever, weight loss, or fatigue  EYES: No eye pain, visual disturbances, or discharge  ENMT:  No difficulty hearing, tinnitus, vertigo; No sinus or throat pain  NECK: No pain or stiffness  BREASTS: No pain, masses, or nipple discharge  RESPIRATORY: No cough, wheezing, chills or hemoptysis; No shortness of breath  CARDIOVASCULAR: No chest pain, palpitations, dizziness, or leg swelling  GASTROINTESTINAL: No abdominal or epigastric pain. No nausea, vomiting, or hematemesis; No diarrhea or constipation. No melena or hematochezia.  GENITOURINARY: No dysuria, frequency, hematuria, or incontinence  NEUROLOGICAL: No headaches, memory loss, loss of strength, numbness, or tremors  SKIN: No itching, burning, rashes, or lesions   LYMPH NODES: No enlarged glands  ENDOCRINE: No heat or cold intolerance; No hair loss  MUSCULOSKELETAL: rt knee pain     Consultant(s) Notes Reviewed:  [x ] YES  [ ] NO    PHYSICAL EXAM:  GENERAL: NAD, well-groomed, well-developed, not in any distress ,  HEAD:  Atraumatic, Normocephalic  EYES: EOMI, PERRLA, conjunctiva and sclera clear ,wound above left eye lid +  ENMT: No tonsillar erythema, exudates, or enlargement; Moist mucous membranes, Good dentition, No lesions  NECK: Supple, No JVD, Normal thyroid  NERVOUS SYSTEM:  Alert & Oriented X3, No focal deficit   CHEST/LUNG: Good air entry bilateral with no  rales, rhonchi, wheezing, or rubs  HEART: Regular rate and rhythm; No murmurs, rubs, or gallops  ABDOMEN: Soft, Nontender, Nondistended; Bowel sounds present  EXTREMITIES:  2+ Peripheral Pulses, No clubbing, cyanosis, or edema  SKIN: No rashes or lesions    Care Discussed with Consultants/Other Providers [ x] YES  [ ] NO

## 2018-07-10 NOTE — PROGRESS NOTE ADULT - ASSESSMENT
78Y f, who walks with walker, with PMHx of COPD, Bipolar disorder, Parkinsonism, was brought to ER by EMS for unwitnessed fall in morning (07-).     Problem/Plan - 1:  ·  Problem: Fall, initial encounter.  Plan: Unwitnessed fall Possibly mechanical as was trying to  cell phone.   Pain right knee. CT spine and head and X rays spine, chest and limb negative for fracture.  Ortho helping.   laceration 4cm just above left eye lid.  No  orthostatsis .   replacing Vit D.   PT helping.   TTE  noted and Carotid doppler noted      Problem/Plan - 2:  ·  Problem: UTI (urinary tract infection).  Plan: UA shows moderate leukocytes esterase concentration,26-50 WBC's, trace blood and few bacteria.  Ceftriaxone 1G q 24 hour started.Will change to PO ceftin 250mg bid to complete 5 days abxs.    urine culture noted.    ID consult noted.    Problem/Plan - 3:  ·  Problem: Ambulatory dysfunction.  Plan: Pt walks with walker.      Problem/Plan - 4:  ·  Problem: Parkinson's disease.  Plan: hand tremors noted. no stiffness.  on Trihexyphenidyl.      Problem/Plan - 5:  ·  Problem: HLD (hyperlipidemia).  Plan: pt taking simvastatin.      Problem/Plan - 6:  Problem: Bipolar 1 disorder. Plan: Pt is on lithium and resperidone. Will check Lithium level.      Problem/Plan - 7:  ·  Problem: Chronic obstructive pulmonary disease, unspecified COPD type.  Plan: on ipratropium albuterol inhaler.  on prednisone tablet.     Problem/Plan - 8:  ·  Problem: Rt Knee pain .  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 Moderate medial and lateral compartment arthrosis. Moderate   joint effusion with numerous large ossified loose bodies.    No fracture.    < end of copied text >  Ortho consult noted and no intervention . PT consult  noted .       Problem/Plan - 9:  ·  Problem: CKD stage 3 with TAMARA  .  Plan: Renal consulted. Renal US pending . Will check Lithium level.         Problem/Plan - 10:  ·  Problem: Prophylactic measure.  Plan: IMPROVE VTE; DVt prophylaxis.

## 2018-07-10 NOTE — CONSULT NOTE ADULT - ASSESSMENT
1.	Leukocytosis - possibly from steroid use  2.	?UTI  3.	R/o nosocomial pneumonia   ·	cont rocephin 1gm IV daily D5, needs 2 days more  ·	awaiting repeat CXR

## 2018-07-10 NOTE — PROGRESS NOTE ADULT - ASSESSMENT
78Y f, who walks with walker, with PMHx of COPD, Bipolar disorder, Parkinsonism, was brought to ER by EMS for unwitnessed fall in morning

## 2018-07-10 NOTE — PROGRESS NOTE ADULT - PROBLEM SELECTOR PLAN 8
Lithium and resperidone.  Lithium level ordered Lithium and resperidone.  Lithium level elevated at 1.7  Discontinued Lithium  Repeat lithium level, as indicated to safely restart

## 2018-07-10 NOTE — PROGRESS NOTE ADULT - SUBJECTIVE AND OBJECTIVE BOX
NP Note discussed with  Primary Attending    Patient is a 78y old  Female who presents with a chief complaint of Unwitnessed Fall (09 Jul 2018 14:44)      INTERVAL HPI/OVERNIGHT EVENTS: Pt with AMS.  Lithium level and CT brain ordered.  Dr. Farfan, neuro, called.    MEDICATIONS  (STANDING):  ALBUTerol/ipratropium for Nebulization 3 milliLiter(s) Nebulizer every 6 hours  anastrozole 1 milliGRAM(s) Oral daily  bethanechol 25 milliGRAM(s) Oral two times a day  cefTRIAXone   IVPB 1 Gram(s) IV Intermittent every 24 hours  Cyanocobalamin 100 MICROGram(s) 100 MICROGram(s) Oral daily  ergocalciferol 06896 Unit(s) Oral <User Schedule>  heparin  Injectable 5000 Unit(s) SubCutaneous every 12 hours  lithium SR (LITHOBID) 300 milliGRAM(s) Oral two times a day  loratadine 10 milliGRAM(s) Oral daily  pantoprazole    Tablet 40 milliGRAM(s) Oral before breakfast  predniSONE   Tablet 40 milliGRAM(s) Oral daily  risperiDONE   Tablet 1 milliGRAM(s) Oral daily  simvastatin 40 milliGRAM(s) Oral at bedtime  sodium chloride 0.45%. 1000 milliLiter(s) (60 mL/Hr) IV Continuous <Continuous>  trihexyphenidyl 1 milliGRAM(s) Oral three times a day    MEDICATIONS  (PRN):  acetaminophen   Tablet. 650 milliGRAM(s) Oral every 6 hours PRN Moderate Pain (4 - 6)      __________________________________________________  REVIEW OF SYSTEMS:  Unable to obtain 2/2 confusion      Vital Signs Last 24 Hrs  T(C): 36.3 (10 Jul 2018 14:40), Max: 36.9 (09 Jul 2018 20:28)  T(F): 97.4 (10 Jul 2018 14:40), Max: 98.4 (09 Jul 2018 20:28)  HR: 52 (10 Jul 2018 14:40) (50 - 58)  BP: 134/53 (10 Jul 2018 14:40) (134/53 - 150/62)  BP(mean): --  RR: 17 (10 Jul 2018 14:40) (16 - 18)  SpO2: 98% (10 Jul 2018 14:40) (97% - 98%)    ________________________________________________  PHYSICAL EXAM:  GENERAL: NAD  HEENT: Normocephalic;  conjunctivae and sclerae clear; moist mucous membranes;   NECK : supple  CHEST/LUNG: Posterior scattered rhonchi  HEART: S1 S2  regular; no murmurs, gallops or rubs  ABDOMEN: Soft, Nontender, Nondistended; Bowel sounds present  EXTREMITIES: no cyanosis; no edema; no calf tenderness  SKIN: warm and dry; no rash  NERVOUS SYSTEM:  Alert with confusion    _________________________________________________  LABS:                        10.1   14.4  )-----------( 281      ( 10 Jul 2018 07:19 )             32.2     07-10    145  |  112<H>  |  40<H>  ----------------------------<  106<H>  4.1   |  28  |  1.59<H>    Ca    11.0<H>      10 Jul 2018 07:19      PT/INR - ( 10 Jul 2018 07:19 )   PT: 10.1 sec;   INR: 0.93 ratio             CAPILLARY BLOOD GLUCOSE            RADIOLOGY & ADDITIONAL TESTS:    Imaging Personally Reviewed:  YES    Consultant(s) Notes Reviewed:   YES    Care Discussed with Consultants :     Plan of care was discussed with patient and /or primary care giver; all questions and concerns were addressed and care was aligned with patient's wishes.

## 2018-07-11 DIAGNOSIS — E83.52 HYPERCALCEMIA: ICD-10-CM

## 2018-07-11 DIAGNOSIS — G93.40 ENCEPHALOPATHY, UNSPECIFIED: ICD-10-CM

## 2018-07-11 DIAGNOSIS — E87.0 HYPEROSMOLALITY AND HYPERNATREMIA: ICD-10-CM

## 2018-07-11 LAB
ANION GAP SERPL CALC-SCNC: 4 MMOL/L — LOW (ref 5–17)
ANION GAP SERPL CALC-SCNC: 7 MMOL/L — SIGNIFICANT CHANGE UP (ref 5–17)
BUN SERPL-MCNC: 34 MG/DL — HIGH (ref 7–18)
BUN SERPL-MCNC: 37 MG/DL — HIGH (ref 7–18)
CALCIUM SERPL-MCNC: 11.1 MG/DL — HIGH (ref 8.4–10.5)
CALCIUM SERPL-MCNC: 11.4 MG/DL — HIGH (ref 8.4–10.5)
CHLORIDE SERPL-SCNC: 114 MMOL/L — HIGH (ref 96–108)
CHLORIDE SERPL-SCNC: 117 MMOL/L — HIGH (ref 96–108)
CO2 SERPL-SCNC: 27 MMOL/L — SIGNIFICANT CHANGE UP (ref 22–31)
CO2 SERPL-SCNC: 28 MMOL/L — SIGNIFICANT CHANGE UP (ref 22–31)
CREAT SERPL-MCNC: 1.47 MG/DL — HIGH (ref 0.5–1.3)
CREAT SERPL-MCNC: 1.68 MG/DL — HIGH (ref 0.5–1.3)
GLUCOSE SERPL-MCNC: 117 MG/DL — HIGH (ref 70–99)
GLUCOSE SERPL-MCNC: 181 MG/DL — HIGH (ref 70–99)
HCT VFR BLD CALC: 39.7 % — SIGNIFICANT CHANGE UP (ref 34.5–45)
HGB BLD-MCNC: 12.2 G/DL — SIGNIFICANT CHANGE UP (ref 11.5–15.5)
LITHIUM SERPL-MCNC: 1.3 MMOL/L — HIGH (ref 0.6–1.2)
LITHIUM SERPL-MCNC: 1.4 MMOL/L — HIGH (ref 0.6–1.2)
MAGNESIUM SERPL-MCNC: 2.9 MG/DL — HIGH (ref 1.6–2.6)
MAGNESIUM UR-MCNC: 2.8 MG/DL — SIGNIFICANT CHANGE UP
MCHC RBC-ENTMCNC: 28.9 PG — SIGNIFICANT CHANGE UP (ref 27–34)
MCHC RBC-ENTMCNC: 30.6 GM/DL — LOW (ref 32–36)
MCV RBC AUTO: 94.4 FL — SIGNIFICANT CHANGE UP (ref 80–100)
PHOSPHATE SERPL-MCNC: 3.6 MG/DL — SIGNIFICANT CHANGE UP (ref 2.5–4.5)
PLATELET # BLD AUTO: 277 K/UL — SIGNIFICANT CHANGE UP (ref 150–400)
POTASSIUM SERPL-MCNC: 4.3 MMOL/L — SIGNIFICANT CHANGE UP (ref 3.5–5.3)
POTASSIUM SERPL-MCNC: 4.5 MMOL/L — SIGNIFICANT CHANGE UP (ref 3.5–5.3)
POTASSIUM SERPL-SCNC: 4.3 MMOL/L — SIGNIFICANT CHANGE UP (ref 3.5–5.3)
POTASSIUM SERPL-SCNC: 4.5 MMOL/L — SIGNIFICANT CHANGE UP (ref 3.5–5.3)
RBC # BLD: 4.21 M/UL — SIGNIFICANT CHANGE UP (ref 3.8–5.2)
RBC # FLD: 14.2 % — SIGNIFICANT CHANGE UP (ref 10.3–14.5)
SODIUM SERPL-SCNC: 145 MMOL/L — SIGNIFICANT CHANGE UP (ref 135–145)
SODIUM SERPL-SCNC: 152 MMOL/L — HIGH (ref 135–145)
WBC # BLD: 13.7 K/UL — HIGH (ref 3.8–10.5)
WBC # FLD AUTO: 13.7 K/UL — HIGH (ref 3.8–10.5)

## 2018-07-11 PROCEDURE — 70450 CT HEAD/BRAIN W/O DYE: CPT | Mod: 26

## 2018-07-11 PROCEDURE — 76775 US EXAM ABDO BACK WALL LIM: CPT | Mod: 26

## 2018-07-11 PROCEDURE — 99223 1ST HOSP IP/OBS HIGH 75: CPT

## 2018-07-11 PROCEDURE — 71045 X-RAY EXAM CHEST 1 VIEW: CPT | Mod: 26

## 2018-07-11 RX ORDER — SODIUM CHLORIDE 9 MG/ML
1000 INJECTION, SOLUTION INTRAVENOUS
Qty: 0 | Refills: 0 | Status: DISCONTINUED | OUTPATIENT
Start: 2018-07-11 | End: 2018-07-12

## 2018-07-11 RX ADMIN — Medication 25 MILLIGRAM(S): at 17:25

## 2018-07-11 RX ADMIN — HEPARIN SODIUM 5000 UNIT(S): 5000 INJECTION INTRAVENOUS; SUBCUTANEOUS at 06:04

## 2018-07-11 RX ADMIN — SIMVASTATIN 40 MILLIGRAM(S): 20 TABLET, FILM COATED ORAL at 22:23

## 2018-07-11 RX ADMIN — Medication 1 MILLIGRAM(S): at 06:03

## 2018-07-11 RX ADMIN — LORATADINE 10 MILLIGRAM(S): 10 TABLET ORAL at 11:15

## 2018-07-11 RX ADMIN — Medication 3 MILLILITER(S): at 15:31

## 2018-07-11 RX ADMIN — Medication 3 MILLILITER(S): at 02:22

## 2018-07-11 RX ADMIN — Medication 3 MILLILITER(S): at 20:14

## 2018-07-11 RX ADMIN — HEPARIN SODIUM 5000 UNIT(S): 5000 INJECTION INTRAVENOUS; SUBCUTANEOUS at 17:25

## 2018-07-11 RX ADMIN — Medication 1 MILLIGRAM(S): at 13:09

## 2018-07-11 RX ADMIN — Medication 25 MILLIGRAM(S): at 06:04

## 2018-07-11 RX ADMIN — SODIUM CHLORIDE 75 MILLILITER(S): 9 INJECTION, SOLUTION INTRAVENOUS at 10:46

## 2018-07-11 RX ADMIN — Medication 1 MILLIGRAM(S): at 22:23

## 2018-07-11 RX ADMIN — ANASTROZOLE 1 MILLIGRAM(S): 1 TABLET ORAL at 11:15

## 2018-07-11 RX ADMIN — RISPERIDONE 1 MILLIGRAM(S): 4 TABLET ORAL at 11:15

## 2018-07-11 RX ADMIN — PANTOPRAZOLE SODIUM 40 MILLIGRAM(S): 20 TABLET, DELAYED RELEASE ORAL at 06:04

## 2018-07-11 RX ADMIN — Medication 3 MILLILITER(S): at 09:34

## 2018-07-11 RX ADMIN — Medication 40 MILLIGRAM(S): at 06:04

## 2018-07-11 RX ADMIN — CEFTRIAXONE 100 GRAM(S): 500 INJECTION, POWDER, FOR SOLUTION INTRAMUSCULAR; INTRAVENOUS at 17:25

## 2018-07-11 NOTE — PROGRESS NOTE ADULT - ASSESSMENT
Vital Signs Last 24 Hrs  T(C): 37.2 (11 Jul 2018 14:34), Max: 37.2 (11 Jul 2018 14:34)  T(F): 99 (11 Jul 2018 14:34), Max: 99 (11 Jul 2018 14:34)  HR: 73 (11 Jul 2018 14:34) (73 - 92)  BP: 132/67 (11 Jul 2018 14:34) (90/71 - 146/73)  BP(mean): --  RR: 20 (11 Jul 2018 14:34) (16 - 20)  SpO2: 94% (11 Jul 2018 14:34) (94% - 100%) 1.	Leukocytosis - steroid vs lithium use  2.	?UTI  ·	cont rocephin 1gm IV daily D6, dc this after tomorrow's dose

## 2018-07-11 NOTE — PROGRESS NOTE ADULT - ASSESSMENT
78Y f, who walks with walker, with PMHx of COPD, Bipolar disorder, Parkinsonism, was brought to ER by EMS for unwitnessed fall in morning (07-).     Problem/Plan - 1:  ·  Problem: Fall, initial encounter.  Plan: Unwitnessed fall Possibly mechanical as was trying to  cell phone.   Pain right knee. CT spine and head and X rays spine, chest and limb negative for fracture.  Ortho helping.   laceration 4cm just above left eye lid.  No  orthostatsis .   replacing Vit D.   PT helping.   TTE  noted and Carotid doppler noted      Problem/Plan - 2:  ·  Problem: UTI (urinary tract infection).  Plan: Finishing IV Rocephin tomorrow.    Problem/Plan - 3:  ·  Problem: Metabolic Encephalopathy with Unsteady Gait .  Plan: Secondary to Lithium toxicity . Holding Lithium . Will consult Psychiatry for change in her meds.      Problem/Plan - 4:  ·  Problem: Parkinson's disease.  Plan: hand tremors noted. no stiffness.  on Trihexyphenidyl.      Problem/Plan - 5:  ·  Problem: HLD (hyperlipidemia).  Plan: pt taking simvastatin.      Problem/Plan - 6:  Problem: Bipolar 1 disorder. Plan:Off Lithium so will consult psychiatry in AM.      Problem/Plan - 7:  ·  Problem: Chronic obstructive pulmonary disease, unspecified COPD type.  Plan: on ipratropium albuterol inhaler.  on prednisone tablet.     Problem/Plan - 8:  ·  Problem: Rt Knee pain .  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 Moderate medial and lateral compartment arthrosis. Moderate   joint effusion with numerous large ossified loose bodies.    No fracture.    < end of copied text >  Ortho consult noted and no intervention . PT consult  noted .       Problem/Plan - 9:  ·  Problem: CKD stage 3 with TAMARA  .  Plan: Renal helping.        Problem/Plan - 10:  ·  Problem: Electrolyte imbalance .  Plan: Correcting and Renal also helping.         Problem/Plan - 11:  ·  Problem: Prophylactic measure.  Plan: IMPROVE VTE; DVt prophylaxis.

## 2018-07-11 NOTE — PROGRESS NOTE ADULT - PROBLEM SELECTOR PLAN 3
Stat lithium level still elevated at 1.4 (1.7)  Lithium still held  Repeat level in am to safely restart  CT of the brain showed no acute intracranial findings.    Dr. Farfan, neurologist, attributed the AMS to increased lithium level.  CXR showed no new changes since 7/6.

## 2018-07-11 NOTE — PROGRESS NOTE ADULT - SUBJECTIVE AND OBJECTIVE BOX
INTERVAL HPI/OVERNIGHT EVENTS: still lethargic   Vital Signs Last 24 Hrs  T(C): 36.4 (11 Jul 2018 20:33), Max: 37.2 (11 Jul 2018 14:34)  T(F): 97.5 (11 Jul 2018 20:33), Max: 99 (11 Jul 2018 14:34)  HR: 59 (11 Jul 2018 20:33) (59 - 88)  BP: 132/52 (11 Jul 2018 20:33) (90/71 - 132/67)  BP(mean): --  RR: 18 (11 Jul 2018 20:33) (16 - 20)  SpO2: 100% (11 Jul 2018 20:33) (94% - 100%)  I&O's Summary    MEDICATIONS  (STANDING):  ALBUTerol/ipratropium for Nebulization 3 milliLiter(s) Nebulizer every 6 hours  anastrozole 1 milliGRAM(s) Oral daily  bethanechol 25 milliGRAM(s) Oral two times a day  cefTRIAXone   IVPB 1 Gram(s) IV Intermittent every 24 hours  Cyanocobalamin 100 MICROGram(s) 100 MICROGram(s) Oral daily  dextrose 5%. 1000 milliLiter(s) (75 mL/Hr) IV Continuous <Continuous>  ergocalciferol 91959 Unit(s) Oral <User Schedule>  heparin  Injectable 5000 Unit(s) SubCutaneous every 12 hours  loratadine 10 milliGRAM(s) Oral daily  pantoprazole    Tablet 40 milliGRAM(s) Oral before breakfast  predniSONE   Tablet 40 milliGRAM(s) Oral daily  risperiDONE   Tablet 1 milliGRAM(s) Oral daily  simvastatin 40 milliGRAM(s) Oral at bedtime  trihexyphenidyl 1 milliGRAM(s) Oral three times a day    MEDICATIONS  (PRN):  acetaminophen   Tablet. 650 milliGRAM(s) Oral every 6 hours PRN Moderate Pain (4 - 6)    LABS:                        12.2   13.7  )-----------( 277      ( 11 Jul 2018 06:49 )             39.7     07-11    145  |  114<H>  |  34<H>  ----------------------------<  181<H>  4.5   |  27  |  1.47<H>    Ca    11.1<H>      11 Jul 2018 16:15  Phos  3.6     07-11  Mg     2.9     07-11      PT/INR - ( 10 Jul 2018 07:19 )   PT: 10.1 sec;   INR: 0.93 ratio             CAPILLARY BLOOD GLUCOSE            Consultant(s) Notes Reviewed:  [x ] YES  [ ] NO    PHYSICAL EXAM:  GENERAL: NAD, well-groomed, well-developed, not in any distress ,  HEAD:  Atraumatic, Normocephalic  EYES: EOMI, PERRLA, conjunctiva and sclera clear  ENMT: No tonsillar erythema, exudates, or enlargement; Moist mucous membranes, Good dentition, No lesions  NECK: Supple, No JVD, Normal thyroid  NERVOUS SYSTEM:  Alert & Oriented X2, No focal deficit   CHEST/LUNG: Good air entry bilateral with no  rales, rhonchi, wheezing, or rubs  HEART: Regular rate and rhythm; No murmurs, rubs, or gallops  ABDOMEN: Soft, Nontender, Nondistended; Bowel sounds present  EXTREMITIES:  2+ Peripheral Pulses, No clubbing, cyanosis, or edema  SKIN: No rashes or lesions    Care Discussed with Consultants/Other Providers [ x] YES  [ ] NO

## 2018-07-11 NOTE — CONSULT NOTE ADULT - SUBJECTIVE AND OBJECTIVE BOX
Patient is a 78y old  Female who presents with a chief complaint of Unwitnessed Fall (09 Jul 2018 14:44)      HPI:  78Y f, who walks with walker, with PMHx of COPD, Bipolar disorder, Parkinsonism, was brought to ER by EMS for unwitnessed fall in morning (07-). Pt was trying to  her phone when she tripped and experienced a forward fall in which she hit her L side of face. Pt did not experience any light handedness dizziness, vertigo, head spining nausea, vomiting, cp, palpitation or loss of consciousness.  Pt had laceration 4cm laceration just above left eyelid which was closed by steri tape. She complain of right knee pain which is constant and 10/10 in intensity and aggravates the movement according to her. pt also complains of R sided abdominal pain which is 3/10 in intensity.  She had a fall last week and twice she fell from her bed since last week. She lives alone and does not have any help or assistance at home. (06 Jul 2018 23:29)         Neurological Review of Systems:  No difficulty with language.  No vision loss or double vision.  No dizziness, vertigo or new hearing loss.  No difficulty with speech or swallowing.  No focal weakness.  No focal sensory changes.  No numbness or tingling in the bilateral lower extremities.  No difficulty with balance.  No difficulty with ambulation.        MEDICATIONS  (STANDING):  ALBUTerol/ipratropium for Nebulization 3 milliLiter(s) Nebulizer every 6 hours  anastrozole 1 milliGRAM(s) Oral daily  bethanechol 25 milliGRAM(s) Oral two times a day  cefTRIAXone   IVPB 1 Gram(s) IV Intermittent every 24 hours  Cyanocobalamin 100 MICROGram(s) 100 MICROGram(s) Oral daily  dextrose 5%. 1000 milliLiter(s) (75 mL/Hr) IV Continuous <Continuous>  ergocalciferol 91903 Unit(s) Oral <User Schedule>  heparin  Injectable 5000 Unit(s) SubCutaneous every 12 hours  loratadine 10 milliGRAM(s) Oral daily  pantoprazole    Tablet 40 milliGRAM(s) Oral before breakfast  predniSONE   Tablet 40 milliGRAM(s) Oral daily  risperiDONE   Tablet 1 milliGRAM(s) Oral daily  simvastatin 40 milliGRAM(s) Oral at bedtime  trihexyphenidyl 1 milliGRAM(s) Oral three times a day    MEDICATIONS  (PRN):  acetaminophen   Tablet. 650 milliGRAM(s) Oral every 6 hours PRN Moderate Pain (4 - 6)    Allergies    codeine (Unknown)  Thorazine (Unknown)    Intolerances      PAST MEDICAL & SURGICAL HISTORY:  HLD (hyperlipidemia)  Parkinson's disease  Bipolar 1 disorder  COPD (chronic obstructive pulmonary disease)  No significant past surgical history    FAMILY HISTORY:  No pertinent family history in first degree relatives    SOCIAL HISTORY: non smoker/ former smoker/ active smoker    Review of Systems:  Constitutional: No generalized weakness. No fevers or chills.                    Eyes, Ears, Mouth, Throat: No vision loss   Respiratory: No shortness of breath or cough.                                Cardiovascular: No chest pain or palpitations  Gastrointestinal: No nausea or vomiting.                                         Genitourinary: No urinary incontinence or burning on urination.  Musculoskeletal: No joint pain.                                                           Dermatologic: No rash.  Neurological: as per HPI                                                                      Psychiatric: No behavioral problems.  Endocrine: No known hypoglycemia.               Hematologic/Lymphatic: No easy bleeding.    O:  Vital Signs Last 24 Hrs  T(C): 36.7 (11 Jul 2018 08:19), Max: 36.7 (10 Jul 2018 20:27)  T(F): 98 (11 Jul 2018 08:19), Max: 98 (10 Jul 2018 20:27)  HR: 81 (11 Jul 2018 08:19) (52 - 92)  BP: 127/76 (11 Jul 2018 08:19) (90/71 - 146/73)  BP(mean): --  RR: 18 (11 Jul 2018 08:19) (16 - 18)  SpO2: 100% (11 Jul 2018 08:19) (97% - 100%)    General Exam:   General appearance: No acute distress                 Cardiovascular: Pedal dorsalis pulses intact bilaterally    Mental Status: Orientated to self, date and place.  Attention intact.  No dysarthria, aphasia or neglect.  Knowledge intact.  Registration intact.  Short and long term memory grossly intact.      Cranial Nerves: CN I - not tested.  PERRL, EOMI, VFF, no nystagmus or diplopia.  No APD.  Fundi not visualized.  CN V1-3 intact to light touch and pinprick.  No facial asymmetry.  Hearing intact to finger rub bilaterally.  Tongue, uvula and palate midline.  Sternocleidomastoid and Trapezius intact bilaterally.    Motor:   Tone: normal.                  Strength intact throughout  No pronator drift bilaterally                      No dysmetria on finger-nose-finger or heel-shin-heel  No truncal ataxia.  No resting, postural or action tremor.  No myoclonus.    Sensation: intact to light touch, pinprick, vibration and proprioception    Deep Tendon Reflexes: 1+ bilateral biceps, triceps, brachioradialis, knee and ankle  Toes flexor bilaterally    Gait: normal and stable.  Rhomberg -otf.    Other:     LABS:                        12.2   13.7  )-----------( 277      ( 11 Jul 2018 06:49 )             39.7     07-11    152<H>  |  117<H>  |  37<H>  ----------------------------<  117<H>  4.3   |  28  |  1.68<H>    Ca    11.4<H>      11 Jul 2018 06:49  Phos  3.6     07-11  Mg     2.9     07-11      PT/INR - ( 10 Jul 2018 07:19 )   PT: 10.1 sec;   INR: 0.93 ratio         Lithium Level, Serum (07.11.18 @ 06:49)    Lithium Level, Serum: 1.4 mmol/L    Lithium Level, Serum (07.10.18 @ 15:11)    Lithium Level, Serum: 1.7: TYPE:(C=Critical, N=Notification, A=Abnormal) C  TESTS: _JACQUES  DATE/TIME CALLED:07/10/18 16:37 _  CALLED TO: HOMAR LOU RN  READ BACK (2 Patient Identifiers)(Y/N): Y_  READ BACK VALUES (Y/N): _Y  CALLED BY: RADHA TORRESS mmol/L            RADIOLOGY & ADDITIONAL STUDIES:    EKG: < from: 12 Lead ECG (07.06.18 @ 17:56) >  Diagnosis Line Sinus rhythm with marked sinus arrhythmia with occasional Premature ventricular complexes  Indeterminate axis  Incomplete right bundle branch block  Septal infarct , age undetermined  Abnormal ECG  Confirmed by MARCELLA LAN, JONATHAN ([id number]) on 10-Jul-2018 06:48:01    < end of copied text >    < from: CT Head No Cont (07.06.18 @ 12:19) > (images reviwed)  IMPRESSION:    Involutional and ischemic gliotic changes.  No acute intracranial hemorrhage.    < end of copied text > patient poor historian, Gissell obtained mainly from chart  Patient is a 78y old  Female who presents with a chief complaint of Unwitnessed Fall (09 Jul 2018 14:44)      HPI:  78Y f, who walks with walker, with PMHx of COPD, Bipolar disorder, Parkinsonism, was brought to ER by EMS for unwitnessed fall in morning (07-),  neurology was called for altered mental status. Pt was trying to  her phone when she tripped and experienced a forward fall in which she hit her L side of face. Pt did not experience any light handedness dizziness, vertigo, head spining nausea, vomiting, cp, palpitation or loss of consciousness.  Pt had laceration 4cm laceration just above left eyelid which was closed by steri tape. She complain of right knee pain which is constant and 10/10 in intensity and aggravates the movement according to her. Pt also complains of R sided abdominal pain which is 3/10 in intensity.  She had a fall last week and twice she fell from her bed since last week. She lives alone and does not have any help or assistance at home.     In the hospital, the lithium level was high.  Lithium was held, mental status improved.  Patient endorses taking more lithium than given.    Neurological Review of Systems:  No difficulty with language.  No vision loss or double vision.  No dizziness, vertigo or new hearing loss.  No difficulty with speech or swallowing.  No focal weakness.  No focal sensory changes.  No numbness or tingling in the bilateral lower extremities.  + difficulty with balance and difficulty with ambulation.        MEDICATIONS  (STANDING):  ALBUTerol/ipratropium for Nebulization 3 milliLiter(s) Nebulizer every 6 hours  anastrozole 1 milliGRAM(s) Oral daily  bethanechol 25 milliGRAM(s) Oral two times a day  cefTRIAXone   IVPB 1 Gram(s) IV Intermittent every 24 hours  Cyanocobalamin 100 MICROGram(s) 100 MICROGram(s) Oral daily  dextrose 5%. 1000 milliLiter(s) (75 mL/Hr) IV Continuous <Continuous>  ergocalciferol 91507 Unit(s) Oral <User Schedule>  heparin  Injectable 5000 Unit(s) SubCutaneous every 12 hours  loratadine 10 milliGRAM(s) Oral daily  pantoprazole    Tablet 40 milliGRAM(s) Oral before breakfast  predniSONE   Tablet 40 milliGRAM(s) Oral daily  risperiDONE   Tablet 1 milliGRAM(s) Oral daily  simvastatin 40 milliGRAM(s) Oral at bedtime  trihexyphenidyl 1 milliGRAM(s) Oral three times a day    MEDICATIONS  (PRN):  acetaminophen   Tablet. 650 milliGRAM(s) Oral every 6 hours PRN Moderate Pain (4 - 6)    Allergies    codeine (Unknown)  Thorazine (Unknown)    Intolerances      PAST MEDICAL & SURGICAL HISTORY:  HLD (hyperlipidemia)  Parkinson's disease  Bipolar 1 disorder  COPD (chronic obstructive pulmonary disease)  No significant past surgical history    FAMILY HISTORY:  No pertinent family history in first degree relatives    SOCIAL HISTORY: non smoker    Review of Systems:  Constitutional: No fevers.                    Eyes, Ears, Mouth, Throat: No vision loss   Respiratory: No cough.                                Cardiovascular: No chest pain.  Gastrointestinal: No vomiting.                                         Genitourinary: No burning on urination.  Musculoskeletal: No joint pain.                                                           Dermatologic: No rash.  Neurological: as per HPI                                                                      Psychiatric: No behavioral problems.  Endocrine: No known hypoglycemia.               Hematologic/Lymphatic: No easy bleeding.    O:  Vital Signs Last 24 Hrs  T(C): 36.7 (11 Jul 2018 08:19), Max: 36.7 (10 Jul 2018 20:27)  T(F): 98 (11 Jul 2018 08:19), Max: 98 (10 Jul 2018 20:27)  HR: 81 (11 Jul 2018 08:19) (52 - 92)  BP: 127/76 (11 Jul 2018 08:19) (90/71 - 146/73)  BP(mean): --  RR: 18 (11 Jul 2018 08:19) (16 - 18)  SpO2: 100% (11 Jul 2018 08:19) (97% - 100%)    General Exam:   General appearance: No acute distress                 Cardiovascular: Pedal dorsalis pulses intact bilaterally    Mental Status: Orientated to self, date and place.  Attention intact.  No dysarthria, aphasia or neglect.  Knowledge intact.  Registration intact.  Short and long term memory grossly intact.      Cranial Nerves: CN I - not tested.  PERRL, EOMI, VFF, no nystagmus or diplopia.  No APD.  Fundi not visualized.  CN V1-3 intact to light touch.  No facial asymmetry.  Hearing intact to finger rub bilaterally.  Tongue, uvula and palate midline.  Sternocleidomastoid and Trapezius intact bilaterally.    Motor:   Tone: normal.                  Strength intact throughout  No pronator drift bilaterally                      No dysmetria on finger-nose-finger or heel-shin-heel    Sensation: intact to light touch    Deep Tendon Reflexes: 1+ bilateral biceps, triceps, brachioradialis, knee and ankle  Toes flexor bilaterally    Gait: patient declines, feels unsteady    Other: the patient also has postural tremors    LABS:                        12.2   13.7  )-----------( 277      ( 11 Jul 2018 06:49 )             39.7     07-11    152<H>  |  117<H>  |  37<H>  ----------------------------<  117<H>  4.3   |  28  |  1.68<H>    Ca    11.4<H>      11 Jul 2018 06:49  Phos  3.6     07-11  Mg     2.9     07-11      PT/INR - ( 10 Jul 2018 07:19 )   PT: 10.1 sec;   INR: 0.93 ratio         Lithium Level, Serum (07.11.18 @ 06:49)    Lithium Level, Serum: 1.4 mmol/L    Lithium Level, Serum (07.10.18 @ 15:11)    Lithium Level, Serum: 1.7: TYPE:(C=Critical, N=Notification, A=Abnormal) C  TESTS: _JACQUES  DATE/TIME CALLED:07/10/18 16:37 _  CALLED TO: HOMAR LOU RN  READ BACK (2 Patient Identifiers)(Y/N): Y_  READ BACK VALUES (Y/N): _Y  CALLED BY: _S. BELINDA mmol/L        RADIOLOGY & ADDITIONAL STUDIES:    EKG: < from: 12 Lead ECG (07.06.18 @ 17:56) >  Diagnosis Line Sinus rhythm with marked sinus arrhythmia with occasional Premature ventricular complexes  Indeterminate axis  Incomplete right bundle branch block  Septal infarct , age undetermined  Abnormal ECG  Confirmed by MARCELLA LAN, JONATHAN ([id number]) on 10-Jul-2018 06:48:01    < end of copied text >    < from: CT Head No Cont (07.06.18 @ 12:19) > (images reviwed)  IMPRESSION:    Involutional and ischemic gliotic changes.  No acute intracranial hemorrhage.    < end of copied text >

## 2018-07-11 NOTE — DIETITIAN INITIAL EVALUATION ADULT. - PROBLEM SELECTOR PLAN 1
Unwitnessed fall Possibly mechanical.  Pain right knee. CT spine and head and X rays spine, chest and limb negative for fracture.  will obtain ortho consult.  laceration 4cm just above left eye lid.  ordered orthostatic vitals.  Vit b12 and vit d level ordered.  PT evaluation ordered.  workup for syncope. ordered TTE and Carotid doppler.(as per attending notes)

## 2018-07-11 NOTE — PROGRESS NOTE ADULT - PROBLEM SELECTOR PLAN 7
11.4 secondary to intravAscular depletion and lithium toxicity  IVF/repeat BMP at 1600 hours and will give calcitonin if greater than 12  Dr. Tellez, nephro, consulted 11.4 secondary to intravAscular depletion and lithium toxicity  IVF given  Dr. Tellez, nephro, consulted  Calcium level at 1600 11.1

## 2018-07-11 NOTE — PROGRESS NOTE ADULT - PROBLEM SELECTOR PLAN 5
with CKD stage 3  Dr. Tellez, nephro, consulted and ordered urine lytes which  showed low potassium   S cr 1.68 (1.59)  Renal US showed no hydronephrosis.  Right renal cyst  lithium level elevated at 1.4  Repeat level in am in order to safely resume lithium  D5W at 75 ml/hr with CKD stage 3  Dr. Tellez, nephro, consulted and ordered urine lytes which  showed low potassium   S cr 1.68 (1.59)  Renal US showed no hydronephrosis.  Right renal cyst  lithium level elevated at 1.3  Repeat level in am in order to safely resume lithium  D5W at 75 ml/hr with CKD stage 3  Dr. Tellez, nephro, consulted and ordered urine lytes which  showed low potassium   S cr 1.68  Renal US showed no hydronephrosis.  Right renal cyst  D5W at 75 ml/hr  1.47 cr at 1600 hrs

## 2018-07-11 NOTE — PROGRESS NOTE ADULT - SUBJECTIVE AND OBJECTIVE BOX
NP Note discussed with  Primary Attending    Patient is a 78y old  Female who presents with a chief complaint of Unwitnessed Fall (09 Jul 2018 14:44)      INTERVAL HPI/OVERNIGHT EVENTS: Improvement in mental status    MEDICATIONS  (STANDING):  ALBUTerol/ipratropium for Nebulization 3 milliLiter(s) Nebulizer every 6 hours  anastrozole 1 milliGRAM(s) Oral daily  bethanechol 25 milliGRAM(s) Oral two times a day  cefTRIAXone   IVPB 1 Gram(s) IV Intermittent every 24 hours  Cyanocobalamin 100 MICROGram(s) 100 MICROGram(s) Oral daily  dextrose 5%. 1000 milliLiter(s) (75 mL/Hr) IV Continuous <Continuous>  ergocalciferol 56278 Unit(s) Oral <User Schedule>  heparin  Injectable 5000 Unit(s) SubCutaneous every 12 hours  loratadine 10 milliGRAM(s) Oral daily  pantoprazole    Tablet 40 milliGRAM(s) Oral before breakfast  predniSONE   Tablet 40 milliGRAM(s) Oral daily  risperiDONE   Tablet 1 milliGRAM(s) Oral daily  simvastatin 40 milliGRAM(s) Oral at bedtime  trihexyphenidyl 1 milliGRAM(s) Oral three times a day    MEDICATIONS  (PRN):  acetaminophen   Tablet. 650 milliGRAM(s) Oral every 6 hours PRN Moderate Pain (4 - 6)      __________________________________________________  REVIEW OF SYSTEMS:  Improvement in mental status, but still mildly confused      Vital Signs Last 24 Hrs  T(C): 37.2 (11 Jul 2018 14:34), Max: 37.2 (11 Jul 2018 14:34)  T(F): 99 (11 Jul 2018 14:34), Max: 99 (11 Jul 2018 14:34)  HR: 73 (11 Jul 2018 14:34) (73 - 92)  BP: 132/67 (11 Jul 2018 14:34) (90/71 - 146/73)  BP(mean): --  RR: 20 (11 Jul 2018 14:34) (16 - 20)  SpO2: 94% (11 Jul 2018 14:34) (94% - 100%)    ________________________________________________  PHYSICAL EXAM:  GENERAL: NAD  HEENT: Normocephalic;  conjunctivae and sclerae clear; moist mucous membranes;   NECK : supple  CHEST/LUNG: Posterior scattered rhonchi  HEART: S1 S2  regular; no murmurs, gallops or rubs  ABDOMEN: Soft, Nontender, Nondistended; Bowel sounds present  EXTREMITIES: no cyanosis; no edema; no calf tenderness  SKIN: warm and dry; no rash  NERVOUS SYSTEM:  Alert with improved mental status    _________________________________________________  LABS:                        12.2   13.7  )-----------( 277      ( 11 Jul 2018 06:49 )             39.7     07-11    152<H>  |  117<H>  |  37<H>  ----------------------------<  117<H>  4.3   |  28  |  1.68<H>    Ca    11.4<H>      11 Jul 2018 06:49  Phos  3.6     07-11  Mg     2.9     07-11      PT/INR - ( 10 Jul 2018 07:19 )   PT: 10.1 sec;   INR: 0.93 ratio             CAPILLARY BLOOD GLUCOSE            RADIOLOGY & ADDITIONAL TESTS:    Imaging Personally Reviewed:  YES    Consultant(s) Notes Reviewed:   YES    Care Discussed with Consultants :     Plan of care was discussed with patient and /or primary care giver; all questions and concerns were addressed and care was aligned with patient's wishes.

## 2018-07-11 NOTE — CONSULT NOTE ADULT - ASSESSMENT
altered mental status  increased lithium level Altered mental status, in patient with increased lithium level, cw toxic metabolic encephalopathy    Will recommend to continue to hold Lithium, consider consult by psych  to consider another agent.  Patient's tremor and unsteadiness while walking can be also side effect of the Lithium.    pls call back with questions

## 2018-07-11 NOTE — DIETITIAN INITIAL EVALUATION ADULT. - PROBLEM SELECTOR PLAN 2
UA shows moderate leukocytes esterase concentration,26-50 WBC's, trace blood and few bacteria.  WBC count of 13.1.  Ceftriaxone 1G q 24 hour started.  Followup with urine culture.

## 2018-07-11 NOTE — PROGRESS NOTE ADULT - SUBJECTIVE AND OBJECTIVE BOX
Subjective: pt seen and examined,  no chest pain or sob     acetaminophen   Tablet. 650 milliGRAM(s) Oral every 6 hours PRN  ALBUTerol/ipratropium for Nebulization 3 milliLiter(s) Nebulizer every 6 hours  anastrozole 1 milliGRAM(s) Oral daily  bethanechol 25 milliGRAM(s) Oral two times a day  cefTRIAXone   IVPB 1 Gram(s) IV Intermittent every 24 hours  Cyanocobalamin 100 MICROGram(s) 100 MICROGram(s) Oral daily  ergocalciferol 90384 Unit(s) Oral <User Schedule>  heparin  Injectable 5000 Unit(s) SubCutaneous every 12 hours  loratadine 10 milliGRAM(s) Oral daily  pantoprazole    Tablet 40 milliGRAM(s) Oral before breakfast  predniSONE   Tablet 40 milliGRAM(s) Oral daily  risperiDONE   Tablet 1 milliGRAM(s) Oral daily  simvastatin 40 milliGRAM(s) Oral at bedtime  sodium chloride 0.45%. 1000 milliLiter(s) IV Continuous <Continuous>  trihexyphenidyl 1 milliGRAM(s) Oral three times a day                            10.1   14.4  )-----------( 281      ( 10 Jul 2018 07:19 )             32.2       Hemoglobin: 10.1 g/dL (07-10 @ 07:19)  Hemoglobin: 10.6 g/dL (07-09 @ 16:18)  Hemoglobin: 10.6 g/dL (07-08 @ 05:54)  Hemoglobin: 10.3 g/dL (07-07 @ 08:02)  Hemoglobin: 11.8 g/dL (07-06 @ 12:08)      07-10    145  |  112<H>  |  40<H>  ----------------------------<  106<H>  4.1   |  28  |  1.59<H>    Ca    11.0<H>      10 Jul 2018 07:19      Creatinine Trend: 1.59<--, 1.49<--, 1.46<--, 1.33<--    COAGS:           T(C): 36.4 (07-11-18 @ 05:43), Max: 36.7 (07-10-18 @ 20:27)  HR: 88 (07-11-18 @ 05:43) (51 - 92)  BP: 90/71 (07-11-18 @ 05:43) (90/71 - 150/62)  RR: 16 (07-11-18 @ 05:43) (16 - 18)  SpO2: 100% (07-11-18 @ 05:43) (97% - 100%)  Wt(kg): --    I&O's Summary    09 Jul 2018 07:01  -  10 Jul 2018 07:00  --------------------------------------------------------  IN: 500 mL / OUT: 0 mL / NET: 500 mL        Appearance: Normal	  HEENT:   Normal oral mucosa, PERRL, EOMI	  Lymphatic: No lymphadenopathy , no edema  Cardiovascular: Normal S1 S2, No JVD, No murmurs , Peripheral pulses palpable 2+ bilaterally  Respiratory: Lungs clear to auscultation, normal effort 	  Gastrointestinal:  Soft, Non-tender, + BS	  Skin: No rashes, No ecchymoses, No cyanosis, warm to touch  Musculoskeletal: Normal range of motion, normal strength  Psychiatry:  Mood & affect appropriate    TELEMETRY: 	none       DIAGNOSTIC TESTING:  [ ] Echocardiogram: < from: Transthoracic Echocardiogram (01.17.17 @ 07:05) >  ------------------------------------------------------------------------  CONCLUSIONS:  1. Normal mitral valve. Mild mitral regurgitation.  2. Normal trileaflet aortic valve.  3. Normal aortic root.  4. Normal left atrium.  5. Normal left ventricular internal dimensions and wall  thicknesses.  6. Normal Left Ventricular Systolic Function,  (EF = 55 to  60%)  7. Grade II diastolic dysfunction  8. Normal right atrium.  9. Poorly imaged right ventricle.  10. RA Pressure is 10 mm Hg.  11. RVS Pressure is 29 mm Hg.  12. Not well visualized, probably normal.  13. Normal pericardium with no pericardial effusion.    < end of copied text >      ECHO: < from: Transthoracic Echocardiogram (07.08.18 @ 07:55) >  CONCLUSIONS:  1. Normal mitral valve. Mild mitral regurgitation.  2. Calcified trileaflet aortic valve with normal opening.  3. Aortic Root: 2.6 cm.  4. Normal left atrium.  5. Normal left ventricular internal dimensions and wall  thicknesses.  6. Normal left ventricular function.  7. Grade II diastolic dysfunction.  8. Normal right atrium.  9. Normal right ventricular size and function.  10. RA Pressure is 10 mm Hg.  11. RV systolic pressure is 26 mm Hg.  12. There is mild tricuspid regurgitation.  13.Normal pericardium with no pericardial effusion.    < end of copied text >    [ ]  Catheterization:  [ ] Stress Test:    OTHER: 	  US carotid : < from: US Duplex Carotid Arteries Complete, Bilateral (07.07.18 @ 10:28) >    IMPRESSION:   No hemodynamically significant carotid artery stenoses.     < end of copied text >        ASSESSMENT/PLAN: 	78y Female PMH COPD and Parkinsons admitted with recurrent falls. no injuries    Echo's noted ,    GI / DVT prophylaxis,  keep K>4, mag >2.0   ABX per medicine  cont all home meds for OA, steroids   no s/s of chf on exam  no further CV inpt work up needed . plan for outpt event monitor   D/W Dr Huffman

## 2018-07-11 NOTE — PROGRESS NOTE ADULT - SUBJECTIVE AND OBJECTIVE BOX
more awake. SOB better. hypercalcemia and hypernatremia noted in setting of elevated Lithium levels  codeine (Unknown)  Thorazine (Unknown)    Hospital Medications:   MEDICATIONS  (STANDING):  ALBUTerol/ipratropium for Nebulization 3 milliLiter(s) Nebulizer every 6 hours  anastrozole 1 milliGRAM(s) Oral daily  bethanechol 25 milliGRAM(s) Oral two times a day  cefTRIAXone   IVPB 1 Gram(s) IV Intermittent every 24 hours  Cyanocobalamin 100 MICROGram(s) 100 MICROGram(s) Oral daily  dextrose 5%. 1000 milliLiter(s) (75 mL/Hr) IV Continuous <Continuous>  ergocalciferol 23421 Unit(s) Oral <User Schedule>  heparin  Injectable 5000 Unit(s) SubCutaneous every 12 hours  loratadine 10 milliGRAM(s) Oral daily  pantoprazole    Tablet 40 milliGRAM(s) Oral before breakfast  predniSONE   Tablet 40 milliGRAM(s) Oral daily  risperiDONE   Tablet 1 milliGRAM(s) Oral daily  simvastatin 40 milliGRAM(s) Oral at bedtime  trihexyphenidyl 1 milliGRAM(s) Oral three times a day        VITALS:  T(F): 98 (18 @ 08:19), Max: 98 (07-10-18 @ 20:27)  HR: 81 (18 @ 08:19)  BP: 127/76 (18 @ 08:19)  RR: 18 (18 @ 08:19)  SpO2: 100% (18 @ 08:19)  Wt(kg): --      PHYSICAL EXAM:  Constitutional: NAD  HEENT: anicteric sclera, oropharynx clear,.  Neck: No JVD  Respiratory: bilat rales+ or rhonchi  Cardiovascular: S1, S2, RRR  Gastrointestinal: BS+, soft, NT/ND  Extremities: No cyanosis or clubbing. No peripheral edema  Neurological: A/O x 3, no focal deficits  Psychiatric: Normal mood, normal affect  : No CVA tenderness. No crisostomo.   Skin: No rashes      LABS:      152<H>  |  117<H>  |  37<H>  ----------------------------<  117<H>  4.3   |  28  |  1.68<H>    Ca    11.4<H>      2018 06:49  Phos  3.6     07-11  Mg     2.9     07-11      Creatinine Trend: 1.68 <--, 1.59 <--, 1.49 <--, 1.46 <--, 1.33 <--                        12.2   13.7  )-----------( 277      ( 2018 06:49 )             39.7     Urine Studies:  Urinalysis Basic - ( 2018 12:19 )    Color: Yellow / Appearance: Hazy / S.005 / pH:   Gluc:  / Ketone: Negative  / Bili: Negative / Urobili: Negative   Blood:  / Protein: 15 / Nitrite: Negative   Leuk Esterase: Moderate / RBC: 2-5 /HPF / WBC 26-50 /HPF   Sq Epi:  / Non Sq Epi: Occasional /HPF / Bacteria: Few /HPF      Osmolality, Random Urine: 232 mos/kg (07-10 @ 11:50)  Sodium, Random Urine: 72 mmol/L (07-10 @ 11:49)  Potassium, Random Urine: 8 mmol/L (07-10 @ 11:49)  Creatinine, Random Urine: <13 mg/dL (07-10 @ 11:49)  Chloride, Random Urine: 72 mmol/L (07-10 @ 11:49)    RADIOLOGY & ADDITIONAL STUDIES:

## 2018-07-11 NOTE — PROGRESS NOTE ADULT - ASSESSMENT
78 yr old female with bipolar disorder, parkinsons disease, and CKD  baseline scr 1.1-1.3. Sent to ED after unwitnessed fall. Sustained laceration to the face. work-up showed UTI ( with leucouria and positive leucocyte esterase). SCR noted to be elevated from baseline to 1.59 prompting a renal consult. She has urinary incontinence.  Pt is a poor historian.    REC'S  hypercalcemia sec to intravAscular depletion and lithium toxicity  need to r/o primary hyperparathyroidism   hypernatremia from poor free water intake  SCR elevated from above as well    Check IPTH, PTH-RP, 25-OH VITAMIN D, 1,25OH VITAMIN D  D5W @75CC/HR  RPT BMP AT 4 PM  IF CALCIUM RISING ( CORRECTED CALCIUM >12), THEN WILL NEED CALCITONIN

## 2018-07-11 NOTE — PROGRESS NOTE ADULT - SUBJECTIVE AND OBJECTIVE BOX
79 y/o female is under our care for ?UTI, leukocytosis and possible pneumonia. Patient was noted to have increased AMS this am and was sent for CT head, results were negative for acute findings. Patient is more awake today, though not able to answer questions properly.  Still have audible congestion and rhoncherous sounds, went for CXR but it was clear.  Renal function has improved and renal sono was negative for hydronephrosis.     REVIEW OF SYSTEMS:  [ X ] Not able to illicit    ALLERGIES: codeine (Unknown)  Thorazine (Unknown)    MEDS:  cefTRIAXone   IVPB 1 Gram(s) IV Intermittent every 24 hours  predniSONE   Tablet 40 milliGRAM(s) Oral daily    VITALS:  Vital Signs Last 24 Hrs  T(C): 37.2 (11 Jul 2018 14:34), Max: 37.2 (11 Jul 2018 14:34)  T(F): 99 (11 Jul 2018 14:34), Max: 99 (11 Jul 2018 14:34)  HR: 73 (11 Jul 2018 14:34) (73 - 92)  BP: 132/67 (11 Jul 2018 14:34) (90/71 - 146/73)  BP(mean): --  RR: 20 (11 Jul 2018 14:34) (16 - 20)  SpO2: 94% (11 Jul 2018 14:34) (94% - 100%)      PHYSICAL EXAM:  HEENT: n/a  Neck: supple no LN's no JVD  Respiratory: bilateral coarse rhoncherous sounds  no rales  Cardiovascular: S1 S2 reg no murmurs  Gastrointestinal: +BS with soft, nondistended abdomen; nontender  +crisostomo   Extremities: mild edema of both lower legs no cyanosis  Skin: no rashes  Ortho: n/a  Neuro: awake, but confused at times      LABS/DIAGNOSTIC TESTS:                        12.2   13.7  )-----------( 277      ( 11 Jul 2018 06:49 )             39.7   WBC Count: 13.7 K/uL (07-11 @ 06:49)  WBC Count: 14.4 K/uL (07-10 @ 07:19)  WBC Count: 14.6 K/uL (07-09 @ 16:18)  WBC Count: 12.5 K/uL (07-08 @ 05:54)  WBC Count: 11.5 K/uL (07-07 @ 08:02)    07-11    145  |  114<H>  |  34<H>  ----------------------------<  181<H>  4.5   |  27  |  1.47<H>    Ca    11.1<H>      11 Jul 2018 16:15  Phos  3.6     07-11  Mg     2.9     07-11        CULTURES:   .Urine Clean Catch (Midstream)  07-08 @ 12:53   <10,000 CFU/ml  Normal Urogenital jamee present  --  --      .Blood Blood  07-06 @ 23:40   No growth to date.  --  --      .Blood Blood  07-06 @ 23:37   No growth to date.  --  --        RADIOLOGY:  EXAM:  XR CHEST PORTABLE ROUTINE 1V                        PROCEDURE DATE:  07/11/2018    INTERPRETATION:  AP semisupine chest on July 11, 2018 at 1:27 PM. Patient   has leukocytosis.    The heart is magnified by technique. The aorta is uncoiled.    There is no sense of active lung or pleural finding.    The chest is similar to July 6.    IMPRESSION: Unchanged chest as above.        EXAM:  CT BRAIN                        PROCEDURE DATE:  07/11/2018    INTERPRETATION:  Exam Date: 7/11/2018 10:10 AM    CT head without IV contrast    CLINICAL INFORMATION:  AMS ADMDIAG1: W19.XXXA UNSPECIFIED FALL, INITIAL   ENCOUNTER/    TECHNIQUE: Contiguous axial sections were obtained through the head.     This scan was performed using automatic exposure control (radiation dose   reduction software) to obtain a diagnostic image quality scan with   patient dose as low as reasonablyachievable.      COMPARISON: CT head July 6, 2018     FINDINGS:       There is no evidence of intraparenchymal or extraaxial hemorrhage.     There is no CT evidence of large vessel acute infarct. No mass effect is   found in the brain.  No evidence of midline shift or herniation pattern.    The ventricles, sulci and basal cisterns appear unremarkable.         Visualized paranasal sinuses are clear.      IMPRESSION:       No acute intracranial findings.        EXAM:  US KIDNEY(S)                        PROCEDURE DATE:  07/11/2018    INTERPRETATION:  Renal ultrasound    Indication: Acute kidney injury.    Renal ultrasound is performed and compared to a previous examination   dated 1/17/2017. Both kidneys are atrophic measuring 7.9 cm in length on   the right and 8.2 cm in length on the left. No evidence for   hydronephrosis, calculus or solid mass in either kidney. Again noted is a   cyst in the upper pole of the right kidney measuring up to2.0 cm.    The IVC, aorta and urinary bladder appear grossly unremarkable.    Impression: No hydronephrosis.    Right renal cyst.

## 2018-07-11 NOTE — PROGRESS NOTE ADULT - PROBLEM SELECTOR PLAN 6
hypernatremia from poor free water intake  D5W at 75 ml/hr  BMP at 1600 hours  Dr. Tellez, nephro, consulted hypernatremia (152) from poor free water intake  D5W at 75 ml/hr  BMP at 1600 hours showed Na of 145   Dr. Tellez, nephro, consulted

## 2018-07-11 NOTE — PROGRESS NOTE ADULT - PROBLEM SELECTOR PLAN 8
Lithium and resperidone.  Lithium level elevated at 1.4 (1.7)  Lithium held  Repeat level in am  Repeat lithium level, as indicated to safely restart Lithium and resperidone.  Lithium level elevated at 1.3 (1.7)  Lithium held  Repeat level in am  Repeat lithium level, as indicated to safely restart

## 2018-07-12 DIAGNOSIS — R25.1 TREMOR, UNSPECIFIED: ICD-10-CM

## 2018-07-12 DIAGNOSIS — R41.0 DISORIENTATION, UNSPECIFIED: ICD-10-CM

## 2018-07-12 DIAGNOSIS — N39.0 URINARY TRACT INFECTION, SITE NOT SPECIFIED: ICD-10-CM

## 2018-07-12 DIAGNOSIS — R40.0 SOMNOLENCE: ICD-10-CM

## 2018-07-12 LAB
24R-OH-CALCIDIOL SERPL-MCNC: 28 NG/ML — LOW (ref 30–80)
ANION GAP SERPL CALC-SCNC: 8 MMOL/L — SIGNIFICANT CHANGE UP (ref 5–17)
BUN SERPL-MCNC: 37 MG/DL — HIGH (ref 7–18)
CALCIUM SERPL-MCNC: 10.8 MG/DL — HIGH (ref 8.4–10.5)
CALCIUM SERPL-MCNC: 11.1 MG/DL — HIGH (ref 8.4–10.5)
CHLORIDE SERPL-SCNC: 108 MMOL/L — SIGNIFICANT CHANGE UP (ref 96–108)
CO2 SERPL-SCNC: 28 MMOL/L — SIGNIFICANT CHANGE UP (ref 22–31)
CREAT ?TM UR-MCNC: 35 MG/DL — SIGNIFICANT CHANGE UP
CREAT SERPL-MCNC: 1.64 MG/DL — HIGH (ref 0.5–1.3)
CULTURE RESULTS: SIGNIFICANT CHANGE UP
CULTURE RESULTS: SIGNIFICANT CHANGE UP
GLUCOSE SERPL-MCNC: 131 MG/DL — HIGH (ref 70–99)
HCT VFR BLD CALC: 36.6 % — SIGNIFICANT CHANGE UP (ref 34.5–45)
HGB BLD-MCNC: 11.3 G/DL — LOW (ref 11.5–15.5)
LITHIUM SERPL-MCNC: 1.1 MMOL/L — SIGNIFICANT CHANGE UP (ref 0.6–1.2)
MCHC RBC-ENTMCNC: 29 PG — SIGNIFICANT CHANGE UP (ref 27–34)
MCHC RBC-ENTMCNC: 30.9 GM/DL — LOW (ref 32–36)
MCV RBC AUTO: 94 FL — SIGNIFICANT CHANGE UP (ref 80–100)
PLATELET # BLD AUTO: 320 K/UL — SIGNIFICANT CHANGE UP (ref 150–400)
POTASSIUM SERPL-MCNC: 4.2 MMOL/L — SIGNIFICANT CHANGE UP (ref 3.5–5.3)
POTASSIUM SERPL-SCNC: 4.2 MMOL/L — SIGNIFICANT CHANGE UP (ref 3.5–5.3)
PTH-INTACT FLD-MCNC: 164 PG/ML — HIGH (ref 15–65)
PTH-INTACT FLD-MCNC: 173 PG/ML — HIGH (ref 15–65)
RBC # BLD: 3.9 M/UL — SIGNIFICANT CHANGE UP (ref 3.8–5.2)
RBC # FLD: 14.3 % — SIGNIFICANT CHANGE UP (ref 10.3–14.5)
SODIUM SERPL-SCNC: 144 MMOL/L — SIGNIFICANT CHANGE UP (ref 135–145)
SPECIMEN SOURCE: SIGNIFICANT CHANGE UP
SPECIMEN SOURCE: SIGNIFICANT CHANGE UP
VIT D25+D1,25 OH+D1,25 PNL SERPL-MCNC: 44.2 PG/ML — SIGNIFICANT CHANGE UP (ref 19.9–79.3)
WBC # BLD: 14.8 K/UL — HIGH (ref 3.8–10.5)
WBC # FLD AUTO: 14.8 K/UL — HIGH (ref 3.8–10.5)

## 2018-07-12 PROCEDURE — 99232 SBSQ HOSP IP/OBS MODERATE 35: CPT

## 2018-07-12 PROCEDURE — 76536 US EXAM OF HEAD AND NECK: CPT | Mod: 26

## 2018-07-12 PROCEDURE — 99223 1ST HOSP IP/OBS HIGH 75: CPT

## 2018-07-12 RX ORDER — CINACALCET 30 MG/1
30 TABLET, FILM COATED ORAL
Qty: 0 | Refills: 0 | Status: DISCONTINUED | OUTPATIENT
Start: 2018-07-12 | End: 2018-07-17

## 2018-07-12 RX ORDER — SODIUM CHLORIDE 9 MG/ML
1000 INJECTION, SOLUTION INTRAVENOUS
Qty: 0 | Refills: 0 | Status: COMPLETED | OUTPATIENT
Start: 2018-07-12 | End: 2018-07-13

## 2018-07-12 RX ADMIN — LORATADINE 10 MILLIGRAM(S): 10 TABLET ORAL at 11:25

## 2018-07-12 RX ADMIN — Medication 1 MILLIGRAM(S): at 06:01

## 2018-07-12 RX ADMIN — Medication 1 MILLIGRAM(S): at 13:23

## 2018-07-12 RX ADMIN — Medication 25 MILLIGRAM(S): at 17:17

## 2018-07-12 RX ADMIN — PANTOPRAZOLE SODIUM 40 MILLIGRAM(S): 20 TABLET, DELAYED RELEASE ORAL at 06:01

## 2018-07-12 RX ADMIN — Medication 3 MILLILITER(S): at 02:15

## 2018-07-12 RX ADMIN — Medication 3 MILLILITER(S): at 15:33

## 2018-07-12 RX ADMIN — CEFTRIAXONE 100 GRAM(S): 500 INJECTION, POWDER, FOR SOLUTION INTRAMUSCULAR; INTRAVENOUS at 17:17

## 2018-07-12 RX ADMIN — HEPARIN SODIUM 5000 UNIT(S): 5000 INJECTION INTRAVENOUS; SUBCUTANEOUS at 06:02

## 2018-07-12 RX ADMIN — Medication 1 MILLIGRAM(S): at 21:41

## 2018-07-12 RX ADMIN — Medication 25 MILLIGRAM(S): at 06:01

## 2018-07-12 RX ADMIN — RISPERIDONE 1 MILLIGRAM(S): 4 TABLET ORAL at 11:25

## 2018-07-12 RX ADMIN — Medication 40 MILLIGRAM(S): at 06:00

## 2018-07-12 RX ADMIN — Medication 3 MILLILITER(S): at 20:16

## 2018-07-12 RX ADMIN — SODIUM CHLORIDE 75 MILLILITER(S): 9 INJECTION, SOLUTION INTRAVENOUS at 13:24

## 2018-07-12 RX ADMIN — CINACALCET 30 MILLIGRAM(S): 30 TABLET, FILM COATED ORAL at 17:17

## 2018-07-12 RX ADMIN — HEPARIN SODIUM 5000 UNIT(S): 5000 INJECTION INTRAVENOUS; SUBCUTANEOUS at 17:17

## 2018-07-12 RX ADMIN — SIMVASTATIN 40 MILLIGRAM(S): 20 TABLET, FILM COATED ORAL at 21:41

## 2018-07-12 RX ADMIN — ANASTROZOLE 1 MILLIGRAM(S): 1 TABLET ORAL at 11:24

## 2018-07-12 NOTE — PROGRESS NOTE ADULT - ASSESSMENT
78Y f, who walks with walker, with PMHx of COPD, Bipolar disorder, Parkinsonism, was brought to ER by EMS for unwitnessed fall in morning (07-).     Problem/Plan - 1:  ·  Problem: Fall, initial encounter.  Plan: Unwitnessed fall Possibly mechanical as was trying to  cell phone.   Pain right knee. CT spine and head and X rays spine, chest and limb negative for fracture.  Ortho helping.   laceration 4cm just above left eye lid.  No  orthostatsis .   replacing Vit D.   PT helping.   TTE  noted and Carotid doppler noted      Problem/Plan - 2:  ·  Problem: UTI (urinary tract infection).  Plan: Finishing IV Rocephin today.    Problem/Plan - 3:  ·  Problem: Metabolic Encephalopathy with Unsteady Gait .  Plan: Resolved.  Secondary to Lithium toxicity . Holding Lithium . Will consult Psychiatry for change in her meds.      Problem/Plan - 4:  ·  Problem: Parkinson's disease.  Plan: Tremors upper extremity worse . Neurology helping.   on Trihexyphenidyl.      Problem/Plan - 5:  ·  Problem: HLD (hyperlipidemia).  Plan: pt taking simvastatin.      Problem/Plan - 6:  Problem: Bipolar 1 disorder. Plan:Off Lithium and level normal now .Will  consult psychiatry in AM.      Problem/Plan - 7:  ·  Problem: Chronic obstructive pulmonary disease, unspecified COPD type.  Plan: on ipratropium albuterol inhaler.  on prednisone tablet.     Problem/Plan - 8:  ·  Problem: Rt Knee pain .  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 Moderate medial and lateral compartment arthrosis. Moderate   joint effusion with numerous large ossified loose bodies.    No fracture.    < end of copied text >  Ortho consult noted and no intervention . PT consult  noted .       Problem/Plan - 9:  ·  Problem: CKD stage 3 with TAMARA  .  Plan: Renal helping.        Problem/Plan - 10:  ·  Problem: Electrolyte imbalance .  Plan: Correcting and Renal also helping. PTH and vit D level pending.         Problem/Plan - 11:  ·  Problem: Prophylactic measure.  Plan: IMPROVE VTE; DVt prophylaxis.    Attending Attestation:   I was physically present for the key portions of the evaluation and management (E/M) service provided.  I agree with the above history, physical, and plan which I have reviewed and edited where appropriate.     Plan discussed with NP in AM. 78Y f, who walks with walker, with PMHx of COPD, Bipolar disorder, Parkinsonism, was brought to ER by EMS for unwitnessed fall in morning (07-).     Problem/Plan - 1:  ·  Problem: Fall, initial encounter.  Plan: Unwitnessed fall Possibly mechanical as was trying to  cell phone.   Pain right knee. CT spine and head and X rays spine, chest and limb negative for fracture.  Ortho helping.   laceration 4cm just above left eye lid.  No  orthostatsis .   replacing Vit D.   PT helping.   TTE  noted and Carotid doppler noted      Problem/Plan - 2:  ·  Problem: UTI (urinary tract infection).  Plan: Finishing IV Rocephin today.    Problem/Plan - 3:  ·  Problem: Metabolic Encephalopathy with Unsteady Gait .  Plan: Resolved.  Secondary to Lithium toxicity . Holding Lithium . Will consult Psychiatry for change in her meds.      Problem/Plan - 4:  ·  Problem: Parkinson's disease.  Plan: Tremors upper extremity worse . Neurology helping.   on Trihexyphenidyl.      Problem/Plan - 5:  ·  Problem: HLD (hyperlipidemia).  Plan: pt taking simvastatin.      Problem/Plan - 6:  Problem: Bipolar 1 disorder. Plan:Off Lithium and level normal now .Will  consult psychiatry in AM.      Problem/Plan - 7:  ·  Problem: Chronic obstructive pulmonary disease, unspecified COPD type.  Plan: on ipratropium albuterol inhaler.  on prednisone tablet.     Problem/Plan - 8:  ·  Problem: Rt Knee pain .  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 Moderate medial and lateral compartment arthrosis. Moderate   joint effusion with numerous large ossified loose bodies.    No fracture.    < end of copied text >  Ortho consult noted and no intervention . PT consult  noted .       Problem/Plan - 9:  ·  Problem: CKD stage 3 with TAMARA  .  Plan: Renal helping.        Problem/Plan - 10:  ·  Problem: Electrolyte imbalance .  Plan: Correcting and Renal also helping. PTH and vit D level pending.         Problem/Plan - 11:  ·  Problem: Prophylactic measure.  Plan: IMPROVE VTE; DVt prophylaxis.

## 2018-07-12 NOTE — PROGRESS NOTE ADULT - PROBLEM SELECTOR PLAN 3
CT head negative for acute findings, likely 2/2 UTI, elevated lithium levels, today 1.1  [place] still held, psychiatry consult requested dr. Ramos to assist in mgt of bipolar disorder management  - neurology appreciated dr. Farfan likely ams 2/2 lithium

## 2018-07-12 NOTE — BEHAVIORAL HEALTH ASSESSMENT NOTE - NSBHCONSULTMEDS_PSY_A_CORE FT
would NOT restart lithium given impaired kidney function would NOT restart lithium given impaired kidney function  would HOLD Risperdal for now given AMS - mood is not primary concern right now

## 2018-07-12 NOTE — PROGRESS NOTE ADULT - SUBJECTIVE AND OBJECTIVE BOX
Neurology Follow up note    Name  NINA CHRISTINE    Subjective:  patient continues to have tremors when doing tasks  patient has no c/p    Review of Systems:  Constitutional:      no fever  Respiratory:                     no cough         MEDICATIONS  (STANDING):  ALBUTerol/ipratropium for Nebulization 3 milliLiter(s) Nebulizer every 6 hours  anastrozole 1 milliGRAM(s) Oral daily  bethanechol 25 milliGRAM(s) Oral two times a day  cefTRIAXone   IVPB 1 Gram(s) IV Intermittent every 24 hours  Cyanocobalamin 100 MICROGram(s) 100 MICROGram(s) Oral daily  dextrose 5%. 1000 milliLiter(s) (75 mL/Hr) IV Continuous <Continuous>  ergocalciferol 56854 Unit(s) Oral <User Schedule>  heparin  Injectable 5000 Unit(s) SubCutaneous every 12 hours  loratadine 10 milliGRAM(s) Oral daily  pantoprazole    Tablet 40 milliGRAM(s) Oral before breakfast  predniSONE   Tablet 40 milliGRAM(s) Oral daily  risperiDONE   Tablet 1 milliGRAM(s) Oral daily  simvastatin 40 milliGRAM(s) Oral at bedtime  trihexyphenidyl 1 milliGRAM(s) Oral three times a day    MEDICATIONS  (PRN):  acetaminophen   Tablet. 650 milliGRAM(s) Oral every 6 hours PRN Moderate Pain (4 - 6)      Allergies    codeine (Unknown)  Thorazine (Unknown)    Intolerances        Objective:   Vital Signs Last 24 Hrs  T(C): 37 (12 Jul 2018 13:16), Max: 37.2 (11 Jul 2018 14:34)  T(F): 98.6 (12 Jul 2018 13:16), Max: 99 (11 Jul 2018 14:34)  HR: 99 (12 Jul 2018 13:16) (51 - 99)  BP: 126/97 (12 Jul 2018 13:16) (122/104 - 145/94)  BP(mean): --  RR: 18 (12 Jul 2018 13:16) (18 - 20)  SpO2: 100% (12 Jul 2018 13:16) (94% - 100%)    General Exam:   General appearance: No acute distress                 Cardiovascular: Pedal dorsalis pulses intact bilaterally    Neurological Exam:  Mental Status: Orientated to self, date and place.  Attention intact.  No dysarthria, aphasia or neglect.     Cranial Nerves: No facial asymmetry.     Motor:   Tone: normal.                  Strength: moves bl arms and legs antigravity, does not cooperate with formal testing  Tremor: No resting.  Has postural tremor.  No myoclonus.    Other:    07-12    144  |  108  |  37<H>  ----------------------------<  131<H>  4.2   |  28  |  1.64<H>    Ca    11.1<H>      12 Jul 2018 06:35  Phos  3.6     07-11  Mg     2.9     07-11 07-12    144  |  108  |  37<H>  ----------------------------<  131<H>  4.2   |  28  |  1.64<H>    Ca    11.1<H>      12 Jul 2018 06:35  Phos  3.6     07-11  Mg     2.9     07-11          Radiology    EKG: < from: 12 Lead ECG (07.06.18 @ 17:56) >  Diagnosis Line Sinus rhythm with marked sinus arrhythmia with occasional Premature ventricular complexes  Indeterminate axis  Incomplete right bundle branch block  Septal infarct , age undetermined  Abnormal ECG  Confirmed by MARCELLA LAN, JONATHAN ([id number]) on 10-Jul-2018 06:48:01    < end of copied text >    < from: CT Head No Cont (07.11.18 @ 10:10) > (images reviewed)  EXAM:  CT BRAIN                            PROCEDURE DATE:  07/11/2018          INTERPRETATION:  Exam Date: 7/11/2018 10:10 AM    CT head without IV contrast    CLINICAL INFORMATION:  AMS ADMDIAG1: W19.XXXA UNSPECIFIED FALL, INITIAL   ENCOUNTER/    TECHNIQUE: Contiguous axial sections were obtained through the head.     This scan was performed using automatic exposure control (radiation dose   reduction software) to obtain a diagnostic image quality scan with   patient dose as low as reasonablyachievable.      COMPARISON: CT head July 6, 2018     FINDINGS:       There is no evidence of intraparenchymal or extraaxial hemorrhage.     There is no CT evidence of large vessel acute infarct. No mass effect is   found in the brain.  No evidence of midline shift or herniation pattern.    The ventricles, sulci and basal cisterns appear unremarkable.         Visualized paranasal sinuses are clear.      IMPRESSION:       No acute intracranial findings.    < end of copied text >

## 2018-07-12 NOTE — PROGRESS NOTE ADULT - ASSESSMENT
78 yr old female with CKD with bilateral atrophic kidneys  on RX for  UTI    HYPERPERCALCEMIA SEC TO HYPERPARATHYROIDISM. LIKELY  LITHIUM RELATED OR UNMASKING OF UNDERLYING PRIMARY HYPERPARATHYROIDISM BY LITHIUM WITH INCREASED THRESHOLD FOR PTH SUPPRESION BY [facility]

## 2018-07-12 NOTE — PROGRESS NOTE ADULT - ASSESSMENT
1.	Leukocytosis - steroid vs lithium use  2.	?UTI  ·	dc rocephin after today's dose  ·	reconsult prn

## 2018-07-12 NOTE — BEHAVIORAL HEALTH ASSESSMENT NOTE - NSBHCONSULTFOLLOWDETAILS_PSY_A_CORE FT
NOT CLEARED for discharge was unable to conduct full assessment NOT CLEARED for discharge was unable to conduct full assessment  please CALL before d/c

## 2018-07-12 NOTE — BEHAVIORAL HEALTH ASSESSMENT NOTE - SUMMARY
78Y f from nursing home, PPH of bipolar last hospitalization 20 years, who walks with walker, with PMHx of COPD, Parkinsonism, was brought to ER by EMS for unwitnessed fall on 7/6/18. Pt was found to have lithium level of 1.7 s/p ?accidental OD (pt is dispensed her meds). High lithium may have been caused by the fact that she has impaired kidney function. would NOT restart for same reason.   Interview was limited by patient's inability to participate and limited information was able to be obtained from HCP. 78Y f from nursing home, PPH of bipolar last hospitalization 20 years, who walks with walker, with PMHx of COPD, Parkinsonism, was brought to ER by EMS for unwitnessed fall on 7/6/18. Pt was found to have lithium level of 1.7 s/p ?accidental OD (pt is dispensed her meds). Pt noted to be confused possibly delirious unable to participate in interview. limited information was able to be obtained from HCP. High lithium may have been caused by the fact that she has impaired kidney function. would NOT restart for same reason. Pt not cleared given full assessment was not possible.

## 2018-07-12 NOTE — PROGRESS NOTE ADULT - PROBLEM SELECTOR PLAN 8
- in behavioral control  - lithium levels normalized, still being held  - c/w Risperdal  - dr. Ramos consulted to see patient if can restart

## 2018-07-12 NOTE — PROGRESS NOTE ADULT - ASSESSMENT
Essenstial tremor, may be due to lithium as well as side effect from another mood stabilizers such as Risperidone.  The tremor may be also exacerbated by infection and metabolic dysfunction the patient has become more uremic and leukocytosis.      Will recommend that the patient has outpatient MRI brain and TFT, and follows up with neurology and psychiatry as outpatient.  Psychiatry should consider changing psych meds.    neuro signs off

## 2018-07-12 NOTE — PROGRESS NOTE ADULT - SUBJECTIVE AND OBJECTIVE BOX
79 y/o female is under our care for ?UTI and leukocytosis. Patient is doing better today and is more awake.  Has no complaints at this time. Completes course of antibiotics after today's dose.     REVIEW OF SYSTEMS:  [  ] Not able to illicit  General: no fevers no malaise  Chest: no cough no sob  GI: no nvd     ALLERGIES: codeine (Unknown)  Thorazine (Unknown)    MEDS:  cefTRIAXone   IVPB 1 Gram(s) IV Intermittent every 24 hours  predniSONE   Tablet 40 milliGRAM(s) Oral daily    VITALS:  Vital Signs Last 24 Hrs  T(C): 37 (12 Jul 2018 13:16), Max: 37 (12 Jul 2018 13:16)  T(F): 98.6 (12 Jul 2018 13:16), Max: 98.6 (12 Jul 2018 13:16)  HR: 99 (12 Jul 2018 13:16) (51 - 99)  BP: 126/97 (12 Jul 2018 13:16) (122/104 - 145/94)  BP(mean): --  RR: 18 (12 Jul 2018 13:16) (18 - 20)  SpO2: 100% (12 Jul 2018 13:16) (96% - 100%)    PHYSICAL EXAM:  HEENT: n/a  Neck: supple no LN's   Respiratory: bilateral rhonchi no rales  Cardiovascular: S1 S2 reg no murmurs  Gastrointestinal: +BS with soft, nondistended abdomen; nontender  +crisostomo   Extremities: no edema   Skin: no rashes  Ortho: n/a  Neuro: awake and more alert    LABS/DIAGNOSTIC TESTS:                        11.3   14.8  )-----------( 320      ( 12 Jul 2018 06:35 )             36.6   WBC Count: 14.8 K/uL (07-12 @ 06:35)  WBC Count: 13.7 K/uL (07-11 @ 06:49)  WBC Count: 14.4 K/uL (07-10 @ 07:19)  WBC Count: 14.6 K/uL (07-09 @ 16:18)  WBC Count: 12.5 K/uL (07-08 @ 05:54)    07-12    144  |  108  |  37<H>  ----------------------------<  131<H>  4.2   |  28  |  1.64<H> 1.47 < 1.68    Ca    11.1<H>      12 Jul 2018 06:35  Phos  3.6     07-11  Mg     2.9     07-11      CULTURES:   .Urine Clean Catch (Midstream)  07-08 @ 12:53   <10,000 CFU/ml  Normal Urogenital jamee present  --  --      .Blood Blood  07-06 @ 23:40   No growth to date.  --  --      .Blood Blood  07-06 @ 23:37   No growth to date.  --  --      RADIOLOGY: No new studies

## 2018-07-12 NOTE — BEHAVIORAL HEALTH ASSESSMENT NOTE - RISK ASSESSMENT
chronically elevated given psychiatric history, however no evidence of suicidality/homicidality  Full risk assessment could not be carried out

## 2018-07-12 NOTE — PROGRESS NOTE ADULT - PROBLEM SELECTOR PLAN 5
acute on chronic kidney disease  stage 3  - renal sono noted  - likely 2/2 poor oral intake and lithium toxicity  - IVF  - renal dr. Tellez following, appreciated  - f/u bmp daily

## 2018-07-12 NOTE — PROGRESS NOTE ADULT - PROBLEM SELECTOR PLAN 7
likely secondary to intravAscular depletion and lithium toxicity  - r/o primary hyperparathyroidism, f/u IPTH  - Ca levels currently unchanged, c/w D%W at 75ml/hr  Dr. Tellez, nephro, consulted and following

## 2018-07-12 NOTE — PROGRESS NOTE ADULT - SUBJECTIVE AND OBJECTIVE BOX
INTERVAL HPI/OVERNIGHT EVENTS: I have tremor which is getting bad.   Vital Signs Last 24 Hrs  T(C): 36.6 (12 Jul 2018 07:23), Max: 37.2 (11 Jul 2018 14:34)  T(F): 97.8 (12 Jul 2018 07:23), Max: 99 (11 Jul 2018 14:34)  HR: 60 (12 Jul 2018 07:23) (51 - 78)  BP: 145/94 (12 Jul 2018 07:25) (118/68 - 145/94)  BP(mean): --  RR: 20 (12 Jul 2018 07:23) (18 - 20)  SpO2: 96% (12 Jul 2018 07:23) (94% - 100%)  I&O's Summary    MEDICATIONS  (STANDING):  ALBUTerol/ipratropium for Nebulization 3 milliLiter(s) Nebulizer every 6 hours  anastrozole 1 milliGRAM(s) Oral daily  bethanechol 25 milliGRAM(s) Oral two times a day  cefTRIAXone   IVPB 1 Gram(s) IV Intermittent every 24 hours  Cyanocobalamin 100 MICROGram(s) 100 MICROGram(s) Oral daily  dextrose 5%. 1000 milliLiter(s) (75 mL/Hr) IV Continuous <Continuous>  ergocalciferol 34379 Unit(s) Oral <User Schedule>  heparin  Injectable 5000 Unit(s) SubCutaneous every 12 hours  loratadine 10 milliGRAM(s) Oral daily  pantoprazole    Tablet 40 milliGRAM(s) Oral before breakfast  predniSONE   Tablet 40 milliGRAM(s) Oral daily  risperiDONE   Tablet 1 milliGRAM(s) Oral daily  simvastatin 40 milliGRAM(s) Oral at bedtime  trihexyphenidyl 1 milliGRAM(s) Oral three times a day    MEDICATIONS  (PRN):  acetaminophen   Tablet. 650 milliGRAM(s) Oral every 6 hours PRN Moderate Pain (4 - 6)    LABS:                        11.3   14.8  )-----------( 320      ( 12 Jul 2018 06:35 )             36.6     07-12    144  |  108  |  37<H>  ----------------------------<  131<H>  4.2   |  28  |  1.64<H>    Ca    11.1<H>      12 Jul 2018 06:35  Phos  3.6     07-11  Mg     2.9     07-11          CAPILLARY BLOOD GLUCOSE              REVIEW OF SYSTEMS:  CONSTITUTIONAL: No fever, weight loss, or fatigue  EYES: No eye pain, visual disturbances, or discharge  ENMT:  No difficulty hearing, tinnitus, vertigo; No sinus or throat pain  NECK: No pain or stiffness  BREASTS: No pain, masses, or nipple discharge  RESPIRATORY: No cough, wheezing, chills or hemoptysis; No shortness of breath  CARDIOVASCULAR: No chest pain, palpitations, dizziness, or leg swelling  GASTROINTESTINAL: No abdominal or epigastric pain. No nausea, vomiting, or hematemesis; No diarrhea or constipation. No melena or hematochezia.  GENITOURINARY: No dysuria, frequency, hematuria, or incontinence  NEUROLOGICAL: No headaches, memory loss, loss of strength, numbness, but  tremors ++_  SKIN: No itching, burning, rashes, or lesions   LYMPH NODES: No enlarged glands  ENDOCRINE: No heat or cold intolerance; No hair loss  MUSCULOSKELETAL: No joint pain or swelling; No muscle, back, or extremity pain  PSYCHIATRIC: No depression, anxiety, mood swings, or difficulty sleeping  HEME/LYMPH: No easy bruising, or bleeding gums  ALLERY AND IMMUNOLOGIC: No hives or eczema    RADIOLOGY & ADDITIONAL TESTS:    Consultant(s) Notes Reviewed:  [x ] YES  [ ] NO    PHYSICAL EXAM:  GENERAL: NAD, well-groomed, well-developed,not in any distress ,  HEAD:  Atraumatic, Normocephalic  EYES: EOMI, PERRLA, conjunctiva and sclera clear, laceration above left eyelid   ENMT: No tonsillar erythema, exudates, or enlargement; Moist mucous membranes, Good dentition, No lesions  NECK: Supple, No JVD, Normal thyroid  NERVOUS SYSTEM:  Alert & Oriented X3, No focal deficit but tremor ++   CHEST/LUNG: Good air entry bilateral with no  rales, rhonchi, wheezing, or rubs  HEART: Regular rate and rhythm; No murmurs, rubs, or gallops  ABDOMEN: Soft, Nontender, Nondistended; Bowel sounds present  EXTREMITIES:  2+ Peripheral Pulses, No clubbing, cyanosis, or edema  SKIN: No rashes or lesions    Care Discussed with Consultants/Other Providers [ x] YES  [ ] NO

## 2018-07-12 NOTE — PROGRESS NOTE ADULT - SUBJECTIVE AND OBJECTIVE BOX
pt awake. No events overnight.    codeine (Unknown)  Thorazine (Unknown)    Hospital Medications:   MEDICATIONS  (STANDING):  ALBUTerol/ipratropium for Nebulization 3 milliLiter(s) Nebulizer every 6 hours  anastrozole 1 milliGRAM(s) Oral daily  bethanechol 25 milliGRAM(s) Oral two times a day  cefTRIAXone   IVPB 1 Gram(s) IV Intermittent every 24 hours  Cyanocobalamin 100 MICROGram(s) 100 MICROGram(s) Oral daily  dextrose 5%. 1000 milliLiter(s) (75 mL/Hr) IV Continuous <Continuous>  ergocalciferol 22246 Unit(s) Oral <User Schedule>  heparin  Injectable 5000 Unit(s) SubCutaneous every 12 hours  loratadine 10 milliGRAM(s) Oral daily  pantoprazole    Tablet 40 milliGRAM(s) Oral before breakfast  predniSONE   Tablet 40 milliGRAM(s) Oral daily  risperiDONE   Tablet 1 milliGRAM(s) Oral daily  simvastatin 40 milliGRAM(s) Oral at bedtime  trihexyphenidyl 1 milliGRAM(s) Oral three times a day      VITALS:  T(F): 98.6 (18 @ 13:16), Max: 98.6 (18 @ 13:16)  HR: 99 (18 @ 13:16)  BP: 126/97 (18 @ 13:16)  RR: 18 (18 @ 13:16)  SpO2: 100% (18 @ 13:16)  Wt(kg): --      PHYSICAL EXAM:  Constitutional: NAD  HEENT: anicteric sclera, oropharynx clear, MMM  Neck: No JVD  Respiratory: Occasional bilat rales  Cardiovascular: S1, S2, RRR  Gastrointestinal: BS+, soft, NT/ND  Extremities: No cyanosis or clubbing.  peripheral edema  Neurological:  no focal deficits        LABS:      144  |  108  |  37<H>  ----------------------------<  131<H>  4.2   |  28  |  1.64<H>    Ca    11.1<H>      2018 06:35  Phos  3.6       Mg     2.9           Creatinine Trend: 1.64 <--, 1.47 <--, 1.68 <--, 1.59 <--, 1.49 <--, 1.46 <--, 1.33 <--                        11.3   14.8  )-----------( 320      ( 2018 06:35 )             36.6     Urine Studies:  Urinalysis Basic - ( 2018 12:19 )    Color: Yellow / Appearance: Hazy / S.005 / pH:   Gluc:  / Ketone: Negative  / Bili: Negative / Urobili: Negative   Blood:  / Protein: 15 / Nitrite: Negative   Leuk Esterase: Moderate / RBC: 2-5 /HPF / WBC 26-50 /HPF   Sq Epi:  / Non Sq Epi: Occasional /HPF / Bacteria: Few /HPF      Osmolality, Random Urine: 232 mos/kg (07-10 @ 11:50)  Sodium, Random Urine: 72 mmol/L (07-10 @ 11:49)  Potassium, Random Urine: 8 mmol/L (07-10 @ 11:49)  Creatinine, Random Urine: <13 mg/dL (07-10 @ 11:49)  Chloride, Random Urine: 72 mmol/L (07-10 @ 11:49)    RADIOLOGY & ADDITIONAL STUDIES:

## 2018-07-12 NOTE — PROGRESS NOTE ADULT - SUBJECTIVE AND OBJECTIVE BOX
NP Note    77Y/o  female, who walks with walker from assisted living with PMHx of COPD, Bipolar disorder, Parkinsonism, was brought to ER by EMS for unwitnessed fall. Admitted with s/p fall, encephalopathy 2/2 to accidental lithium intake, and UTI with elecrolyte imabalance. Currently patient mental status improving, however c/w worsening UE tremors.      MEDICATIONS  (STANDING):  ALBUTerol/ipratropium for Nebulization 3 milliLiter(s) Nebulizer every 6 hours  anastrozole 1 milliGRAM(s) Oral daily  bethanechol 25 milliGRAM(s) Oral two times a day  cefTRIAXone   IVPB 1 Gram(s) IV Intermittent every 24 hours  Cyanocobalamin 100 MICROGram(s) 100 MICROGram(s) Oral daily  dextrose 5%. 1000 milliLiter(s) (75 mL/Hr) IV Continuous <Continuous>  ergocalciferol 90176 Unit(s) Oral <User Schedule>  heparin  Injectable 5000 Unit(s) SubCutaneous every 12 hours  loratadine 10 milliGRAM(s) Oral daily  pantoprazole    Tablet 40 milliGRAM(s) Oral before breakfast  predniSONE   Tablet 40 milliGRAM(s) Oral daily  risperiDONE   Tablet 1 milliGRAM(s) Oral daily  simvastatin 40 milliGRAM(s) Oral at bedtime  trihexyphenidyl 1 milliGRAM(s) Oral three times a day    MEDICATIONS  (PRN):  acetaminophen   Tablet. 650 milliGRAM(s) Oral every 6 hours PRN Moderate Pain (4 - 6)      __________________________________________________  REVIEW OF SYSTEMS:    CONSTITUTIONAL: No fever,   EYES: no acute visual disturbances  NECK: No pain or stiffness  RESPIRATORY: No cough; No shortness of breath  CARDIOVASCULAR: No chest pain, no palpitations  GASTROINTESTINAL: No pain. No nausea or vomiting; No diarrhea   NEUROLOGICAL: No headache or numbness, no tremors  MUSCULOSKELETAL: No joint pain, no muscle pain  GENITOURINARY: no dysuria, no frequency, no hesitancy  PSYCHIATRY: no depression , no anxiety  ALL OTHER  ROS negative        Vital Signs Last 24 Hrs  T(C): 36.6 (12 Jul 2018 07:23), Max: 37.2 (11 Jul 2018 14:34)  T(F): 97.8 (12 Jul 2018 07:23), Max: 99 (11 Jul 2018 14:34)  HR: 60 (12 Jul 2018 07:23) (51 - 73)  BP: 145/94 (12 Jul 2018 07:25) (122/104 - 145/94)  BP(mean): --  RR: 20 (12 Jul 2018 07:23) (18 - 20)  SpO2: 96% (12 Jul 2018 07:23) (94% - 100%)    ________________________________________________  PHYSICAL EXAM:  GENERAL: NAD  HEENT: Normocephalic;  conjunctivae and sclerae clear; moist mucous membranes;   NECK : supple  CHEST/LUNG: Clear to auscultation bilaterally with good air entry   HEART: S1 S2  regular; no murmurs, gallops or rubs  ABDOMEN: Soft, Nontender, Nondistended; Bowel sounds present  EXTREMITIES: no cyanosis; no edema; no calf tenderness  SKIN: warm and dry; no rash  NERVOUS SYSTEM:  Awake and alert; Oriented  to place, person ; no new deficits, tremors UE    _________________________________________________  LABS:                        11.3   14.8  )-----------( 320      ( 12 Jul 2018 06:35 )             36.6     07-12    144  |  108  |  37<H>  ----------------------------<  131<H>  4.2   |  28  |  1.64<H>    Ca    11.1<H>      12 Jul 2018 06:35  Phos  3.6     07-11  Mg     2.9     07-11          CAPILLARY BLOOD GLUCOSE      < from: US Renal (07.11.18 @ 13:58) >  Impression: No hydronephrosis.    Right renal cyst.    < end of copied text >        RADIOLOGY & ADDITIONAL TESTS:    Imaging  Reviewed:  YES    Consultant(s) Notes Reviewed:   YES      Plan of care was discussed with patient; all questions and concerns were addressed

## 2018-07-12 NOTE — BEHAVIORAL HEALTH ASSESSMENT NOTE - HPI (INCLUDE ILLNESS QUALITY, SEVERITY, DURATION, TIMING, CONTEXT, MODIFYING FACTORS, ASSOCIATED SIGNS AND SYMPTOMS)
78Y f, who walks with walker, with PMHx of COPD, Bipolar disorder, Parkinsonism, was brought to ER by EMS for unwitnessed fall on 7/6/18. Pt was found to have lithium level of 1.7 s/p accidental OD. 78Y f from nursing home, PPH of bipolar last hospitalization 20 years, who walks with walker, with PMHx of COPD, Parkinsonism, was brought to ER by EMS for unwitnessed fall on 7/6/18. Pt was found to have lithium level of 1.7 s/p accidental OD.    Patient was somnolent, arousable to verbal stimuli, looked comfortable but noted to be confused and unable to participate in interview or answer questions, kept mumbling to herself "my sister wouldn't know" unable to tell where she is/why    As per nurse she was noted to be confused before.    Sister was called who was noted very irritable from the very beginning for unclear reasons, immediately accusing writer of giving patient melatonin that she had a strange reaction to, it was impossible to redirect her, explanation was attempted to be given about writer's role in the treatment team, writer attempted to ask questions re: pt psychiatric history, however, she would keep going back to accusing treatment team, when high lithium level was discussed, sister stated too much lithium was given by this treatment team raising her voice at writer. Given that the discussion could not be productive despite multiple attempts, no useful information was gained, collateral interview was terminated.

## 2018-07-13 DIAGNOSIS — Z02.9 ENCOUNTER FOR ADMINISTRATIVE EXAMINATIONS, UNSPECIFIED: ICD-10-CM

## 2018-07-13 LAB
ANION GAP SERPL CALC-SCNC: 9 MMOL/L — SIGNIFICANT CHANGE UP (ref 5–17)
BUN SERPL-MCNC: 33 MG/DL — HIGH (ref 7–18)
CALCIUM SERPL-MCNC: 10.1 MG/DL — SIGNIFICANT CHANGE UP (ref 8.4–10.5)
CALCIUM SERPL-MCNC: 10.5 MG/DL — SIGNIFICANT CHANGE UP (ref 8.4–10.5)
CALCIUM UR-MCNC: <1 MG/DL — SIGNIFICANT CHANGE UP
CHLORIDE SERPL-SCNC: 108 MMOL/L — SIGNIFICANT CHANGE UP (ref 96–108)
CO2 SERPL-SCNC: 26 MMOL/L — SIGNIFICANT CHANGE UP (ref 22–31)
CREAT SERPL-MCNC: 1.49 MG/DL — HIGH (ref 0.5–1.3)
EOSINOPHIL NFR BLD AUTO: 2 % — SIGNIFICANT CHANGE UP (ref 0–6)
GLUCOSE SERPL-MCNC: 137 MG/DL — HIGH (ref 70–99)
HCT VFR BLD CALC: 36 % — SIGNIFICANT CHANGE UP (ref 34.5–45)
HGB BLD-MCNC: 11.1 G/DL — LOW (ref 11.5–15.5)
LYMPHOCYTES # BLD AUTO: 16 % — SIGNIFICANT CHANGE UP (ref 13–44)
MCHC RBC-ENTMCNC: 29.1 PG — SIGNIFICANT CHANGE UP (ref 27–34)
MCHC RBC-ENTMCNC: 30.9 GM/DL — LOW (ref 32–36)
MCV RBC AUTO: 94.2 FL — SIGNIFICANT CHANGE UP (ref 80–100)
MONOCYTES NFR BLD AUTO: 1 % — LOW (ref 2–14)
NEUTROPHILS NFR BLD AUTO: 79 % — HIGH (ref 43–77)
PHOSPHATE 24H UR-MCNC: 35.4 MG/DL — SIGNIFICANT CHANGE UP
PHOSPHATE SERPL-MCNC: 3.6 MG/DL — SIGNIFICANT CHANGE UP (ref 2.5–4.5)
PLATELET # BLD AUTO: 320 K/UL — SIGNIFICANT CHANGE UP (ref 150–400)
POTASSIUM SERPL-MCNC: 4.3 MMOL/L — SIGNIFICANT CHANGE UP (ref 3.5–5.3)
POTASSIUM SERPL-SCNC: 4.3 MMOL/L — SIGNIFICANT CHANGE UP (ref 3.5–5.3)
RBC # BLD: 3.82 M/UL — SIGNIFICANT CHANGE UP (ref 3.8–5.2)
RBC # FLD: 14.6 % — HIGH (ref 10.3–14.5)
SODIUM SERPL-SCNC: 143 MMOL/L — SIGNIFICANT CHANGE UP (ref 135–145)
WBC # BLD: 19.1 K/UL — HIGH (ref 3.8–10.5)
WBC # FLD AUTO: 19.1 K/UL — HIGH (ref 3.8–10.5)

## 2018-07-13 RX ORDER — SODIUM CHLORIDE 9 MG/ML
1000 INJECTION, SOLUTION INTRAVENOUS
Qty: 0 | Refills: 0 | Status: DISCONTINUED | OUTPATIENT
Start: 2018-07-13 | End: 2018-07-14

## 2018-07-13 RX ADMIN — Medication 3 MILLILITER(S): at 15:16

## 2018-07-13 RX ADMIN — HEPARIN SODIUM 5000 UNIT(S): 5000 INJECTION INTRAVENOUS; SUBCUTANEOUS at 17:52

## 2018-07-13 RX ADMIN — PANTOPRAZOLE SODIUM 40 MILLIGRAM(S): 20 TABLET, DELAYED RELEASE ORAL at 05:26

## 2018-07-13 RX ADMIN — Medication 25 MILLIGRAM(S): at 05:26

## 2018-07-13 RX ADMIN — Medication 3 MILLILITER(S): at 02:25

## 2018-07-13 RX ADMIN — ANASTROZOLE 1 MILLIGRAM(S): 1 TABLET ORAL at 12:11

## 2018-07-13 RX ADMIN — Medication 40 MILLIGRAM(S): at 05:26

## 2018-07-13 RX ADMIN — HEPARIN SODIUM 5000 UNIT(S): 5000 INJECTION INTRAVENOUS; SUBCUTANEOUS at 05:26

## 2018-07-13 RX ADMIN — Medication 1 MILLIGRAM(S): at 14:08

## 2018-07-13 RX ADMIN — SODIUM CHLORIDE 75 MILLILITER(S): 9 INJECTION, SOLUTION INTRAVENOUS at 12:49

## 2018-07-13 RX ADMIN — Medication 1 MILLIGRAM(S): at 05:26

## 2018-07-13 RX ADMIN — SIMVASTATIN 40 MILLIGRAM(S): 20 TABLET, FILM COATED ORAL at 21:49

## 2018-07-13 RX ADMIN — CINACALCET 30 MILLIGRAM(S): 30 TABLET, FILM COATED ORAL at 17:52

## 2018-07-13 RX ADMIN — Medication 25 MILLIGRAM(S): at 17:51

## 2018-07-13 RX ADMIN — CINACALCET 30 MILLIGRAM(S): 30 TABLET, FILM COATED ORAL at 05:26

## 2018-07-13 RX ADMIN — Medication 1 MILLIGRAM(S): at 21:49

## 2018-07-13 RX ADMIN — LORATADINE 10 MILLIGRAM(S): 10 TABLET ORAL at 12:10

## 2018-07-13 NOTE — PROGRESS NOTE ADULT - SUBJECTIVE AND OBJECTIVE BOX
NP Note discussed with  Primary Attending    Patient is a 78y old  Female who presents with a chief complaint of Unwitnessed Fall (09 Jul 2018 14:44)      INTERVAL HPI/OVERNIGHT EVENTS: no new complaints    MEDICATIONS  (STANDING):  ALBUTerol/ipratropium for Nebulization 3 milliLiter(s) Nebulizer every 6 hours  anastrozole 1 milliGRAM(s) Oral daily  bethanechol 25 milliGRAM(s) Oral two times a day  cinacalcet 30 milliGRAM(s) Oral two times a day  Cyanocobalamin 100 MICROGram(s) 100 MICROGram(s) Oral daily  ergocalciferol 98341 Unit(s) Oral <User Schedule>  heparin  Injectable 5000 Unit(s) SubCutaneous every 12 hours  loratadine 10 milliGRAM(s) Oral daily  pantoprazole    Tablet 40 milliGRAM(s) Oral before breakfast  predniSONE   Tablet 20 milliGRAM(s) Oral daily  simvastatin 40 milliGRAM(s) Oral at bedtime  trihexyphenidyl 1 milliGRAM(s) Oral three times a day    MEDICATIONS  (PRN):  acetaminophen   Tablet. 650 milliGRAM(s) Oral every 6 hours PRN Moderate Pain (4 - 6)      __________________________________________________  REVIEW OF SYSTEMS:  Unable to obtain 2/2 patient's cognitive/mental status      Vital Signs Last 24 Hrs  T(C): 36.6 (13 Jul 2018 14:24), Max: 37.4 (12 Jul 2018 20:21)  T(F): 97.8 (13 Jul 2018 14:24), Max: 99.3 (12 Jul 2018 20:21)  HR: 88 (13 Jul 2018 14:24) (66 - 96)  BP: 112/85 (13 Jul 2018 14:24) (112/85 - 137/73)  BP(mean): --  RR: 20 (13 Jul 2018 14:24) (18 - 20)  SpO2: 98% (13 Jul 2018 14:24) (95% - 99%)    ________________________________________________  PHYSICAL EXAM:  GENERAL: NAD  HEENT: Normocephalic;  conjunctivae and sclerae clear; moist mucous membranes;   NECK : supple  CHEST/LUNG: Clear to auscultation bilaterally with good air entry   HEART: S1 S2  regular; no murmurs, gallops or rubs  ABDOMEN: Soft, Nontender, Nondistended; Bowel sounds present  EXTREMITIES: no cyanosis; no edema; no calf tenderness  SKIN: warm and dry; no rash  NERVOUS SYSTEM:  Alert/improvement mental status    _________________________________________________  LABS:                        11.1   19.1  )-----------( 320      ( 13 Jul 2018 06:43 )             36.0     07-13    143  |  108  |  33<H>  ----------------------------<  137<H>  4.3   |  26  |  1.49<H>    Ca    10.1      13 Jul 2018 06:43  Phos  3.6     07-13          CAPILLARY BLOOD GLUCOSE            RADIOLOGY & ADDITIONAL TESTS:    Imaging Personally Reviewed:  YES    Consultant(s) Notes Reviewed:   YES    Care Discussed with Consultants :     Plan of care was discussed with patient and /or primary care giver; all questions and concerns were addressed and care was aligned with patient's wishes.

## 2018-07-13 NOTE — PROGRESS NOTE ADULT - PROBLEM SELECTOR PLAN 5
acute on chronic kidney disease  stage 3  - renal sono noted  - likely 2/2 poor oral intake and lithium toxicity  IVF  renal dr. Tellez following, appreciated  f/u bmp daily

## 2018-07-13 NOTE — PROGRESS NOTE ADULT - SUBJECTIVE AND OBJECTIVE BOX
INTERVAL HPI/OVERNIGHT EVENTS: I feel better with less tremor.   Vital Signs Last 24 Hrs  T(C): 36.5 (13 Jul 2018 05:44), Max: 37.4 (12 Jul 2018 20:21)  T(F): 97.7 (13 Jul 2018 05:44), Max: 99.3 (12 Jul 2018 20:21)  HR: 66 (13 Jul 2018 05:44) (66 - 99)  BP: 127/64 (13 Jul 2018 05:44) (126/97 - 137/73)  BP(mean): --  RR: 18 (13 Jul 2018 05:44) (18 - 18)  SpO2: 99% (13 Jul 2018 05:44) (95% - 100%)  I&O's Summary    MEDICATIONS  (STANDING):  ALBUTerol/ipratropium for Nebulization 3 milliLiter(s) Nebulizer every 6 hours  anastrozole 1 milliGRAM(s) Oral daily  bethanechol 25 milliGRAM(s) Oral two times a day  cinacalcet 30 milliGRAM(s) Oral two times a day  Cyanocobalamin 100 MICROGram(s) 100 MICROGram(s) Oral daily  dextrose 5%. 1000 milliLiter(s) (75 mL/Hr) IV Continuous <Continuous>  ergocalciferol 04337 Unit(s) Oral <User Schedule>  heparin  Injectable 5000 Unit(s) SubCutaneous every 12 hours  loratadine 10 milliGRAM(s) Oral daily  pantoprazole    Tablet 40 milliGRAM(s) Oral before breakfast  predniSONE   Tablet 40 milliGRAM(s) Oral daily  simvastatin 40 milliGRAM(s) Oral at bedtime  trihexyphenidyl 1 milliGRAM(s) Oral three times a day    MEDICATIONS  (PRN):  acetaminophen   Tablet. 650 milliGRAM(s) Oral every 6 hours PRN Moderate Pain (4 - 6)    LABS:                        11.1   19.1  )-----------( 320      ( 13 Jul 2018 06:43 )             36.0     07-13    143  |  108  |  33<H>  ----------------------------<  137<H>  4.3   |  26  |  1.49<H>    Ca    10.1      13 Jul 2018 06:43  Phos  3.6     07-13          CAPILLARY BLOOD GLUCOSE              REVIEW OF SYSTEMS:  CONSTITUTIONAL: No fever, weight loss, or fatigue  EYES: No eye pain, visual disturbances, or discharge  ENMT:  No difficulty hearing, tinnitus, vertigo; No sinus or throat pain  NECK: No pain or stiffness  BREASTS: No pain, masses, or nipple discharge  RESPIRATORY: No cough, wheezing, chills or hemoptysis; No shortness of breath  CARDIOVASCULAR: No chest pain, palpitations, dizziness, or leg swelling  GASTROINTESTINAL: No abdominal or epigastric pain. No nausea, vomiting, or hematemesis; No diarrhea or constipation. No melena or hematochezia.  GENITOURINARY: No dysuria, frequency, hematuria, or incontinence  NEUROLOGICAL: No headaches, memory loss, loss of strength, numbness, but  tremors  SKIN: No itching, burning, rashes, or lesions   LYMPH NODES: No enlarged glands  ENDOCRINE: No heat or cold intolerance; No hair loss  MUSCULOSKELETAL: No joint pain or swelling; No muscle, back, or extremity pain    Consultant(s) Notes Reviewed:  [x ] YES  [ ] NO    PHYSICAL EXAM:  GENERAL: NAD,Obese ,not in any distress ,  HEAD:  Atraumatic, Normocephalic  EYES: EOMI, PERRLA, conjunctiva and sclera clear, left above eyelid laceration wound  with no discharge etc   ENMT: No tonsillar erythema, exudates, or enlargement; Moist mucous membranes, Good dentition, No lesions  NECK: Supple, No JVD, Normal thyroid  NERVOUS SYSTEM:  Alert & Oriented X3, No focal deficit but tremor+  CHEST/LUNG: Good air entry bilateral with no  rales, rhonchi, wheezing, or rubs  HEART: Regular rate and rhythm; No murmurs, rubs, or gallops  ABDOMEN: Soft, Nontender, Nondistended; Bowel sounds present  EXTREMITIES:  2+ Peripheral Pulses, No clubbing, cyanosis, or edema    Care Discussed with Consultants/Other Providers [ x] YES  [ ] NO

## 2018-07-13 NOTE — PROGRESS NOTE ADULT - PROBLEM SELECTOR PLAN 7
2/2 to PTH  Improved level today at 10.1  D%W at 75ml/hr  US of the neck performed to r/o thyroid CA showed nodules and thickened thyroid.  Thyroid and parathyroid scintigraphy recommended.   Possible US need Biopsy to  follow.    ryley Vazquez, called and will consult  marvel Adame, consulted and following

## 2018-07-13 NOTE — PROGRESS NOTE ADULT - PROBLEM SELECTOR PLAN 3
CT head negative for acute findings, likely 2/2 UTI, elevated lithium levels, today 1.1  Lithium/risperdal held while in hospital, per Dr. Ramos, psych.  She stated  that patient is cleared for discharge and to followup with private psychiatrist in outpatient setting.    Dr. Farfan likely ams 2/2 lithium.  She recommended changing psych meds (no  lithium/risperdal).  She further recommended outpatient MRI for essential tremors.

## 2018-07-13 NOTE — PROGRESS NOTE ADULT - ASSESSMENT
A/P    PT  WITH  CKD   CR   FLUCTUATING,  PROBABLY  AROUND  HER  BASELINE   IS B/W  1.4-1.6-  STABLE     K OK     URINE  SP  GRAVITY  LOW ,  1005,  LITHIUM'S  EFFECT ON  THE  TUBULES  LITHIUM LEVELS  OK  AT  1.1    HIGH  CA,  STARTED  ON  SENSIPAR, IS  BETTER  TODAY  AT 10.1  PTH   IS  HIGH  173,  OUT OF  PROPORTION  TO  THE  DEGREE OF  RENAL  INSUFF, PROBABLY  HAS  PRIMARY  HYPERPARATHYROIDISM  WILL   FOLLOW

## 2018-07-13 NOTE — CONSULT NOTE ADULT - ASSESSMENT
1.hypercalcemia    calcium 10.8.pth 173  elevated pth /  hyperparathyroidism primary/secondary  normal /near normal vit d levels  ckd  creat 1.4-1.6  h/o lithium use   which can cause hypercalcemia  dehydration  check previous calcium levels  ?dexa  bone density  sestamibi parathyroid scan    24 hr urine calcium  hydration  mobilise pt  2.thyroid nodules/rt lobe    left lobe not seen ?surg  euthyroid  recommend fna thyroid nodules to r/o cancer  will follow

## 2018-07-13 NOTE — PROGRESS NOTE ADULT - PROBLEM SELECTOR PLAN 8
Improved behavior today  Risperdal/lithium being held while in hospital, per Dr. Rmaos, psych  Lithium levels monitored  - dr. Ramos consulted to see patient if can restart

## 2018-07-13 NOTE — CONSULT NOTE ADULT - SUBJECTIVE AND OBJECTIVE BOX
HPI:  78Y f, who walks with walker, with PMHx of COPD, Bipolar disorder, Parkinsonism, was brought to ER by EMS for unwitnessed fall in morning (07-). Pt was trying to  her phone when she tripped and experienced a forward fall in which she hit her L side of face. Pt did not experience any light handedness dizziness, vertigo, head spining nausea, vomiting, cp, palpitation or loss of consciousness.  Pt had laceration 4cm laceration just above left eyelid which was closed by steri tape. She complain of right knee pain which is constant and 10/10 in intensity and aggravates the movement according to her. pt also complains of R sided abdominal pain which is 3/10 in intensity.  She had a fall last week and twice she fell from her bed since last week. She lives alone and does not have any help or assistance at home. (06 Jul 2018 23:29)  h/o bipolar disorder/pt on lithium    stopped presently  found to be hypercalcemic  thyroid sono rt thy nodules      PAST MEDICAL & SURGICAL HISTORY:  HLD (hyperlipidemia)  Parkinson's disease  Bipolar 1 disorder  COPD (chronic obstructive pulmonary disease)  No significant past surgical history      codeine (Unknown)  melatonin (Drowsiness)  Thorazine (Unknown)      acetaminophen   Tablet. 650 milliGRAM(s) Oral every 6 hours PRN  ALBUTerol/ipratropium for Nebulization 3 milliLiter(s) Nebulizer every 6 hours  anastrozole 1 milliGRAM(s) Oral daily  bethanechol 25 milliGRAM(s) Oral two times a day  cinacalcet 30 milliGRAM(s) Oral two times a day  Cyanocobalamin 100 MICROGram(s) 100 MICROGram(s) Oral daily  dextrose 5%. 1000 milliLiter(s) IV Continuous <Continuous>  ergocalciferol 41464 Unit(s) Oral <User Schedule>  heparin  Injectable 5000 Unit(s) SubCutaneous every 12 hours  loratadine 10 milliGRAM(s) Oral daily  pantoprazole    Tablet 40 milliGRAM(s) Oral before breakfast  predniSONE   Tablet 20 milliGRAM(s) Oral daily  simvastatin 40 milliGRAM(s) Oral at bedtime  trihexyphenidyl 1 milliGRAM(s) Oral three times a day      Social Hx:    FAMILY HISTORY:  No pertinent family history in first degree relatives      REVIEW OF SYSTEMS: s/p fall  ?oral intake  ?wt changes  ?h/o fractures    CONSTITUTIONAL: No weakness, fevers or chills  EYES/ENT: No visual changes;  No vertigo or throat pain   NECK: No pain or stiffness  RESPIRATORY: No cough, wheezing, hemoptysis; No shortness of breath  CARDIOVASCULAR: No chest pain or palpitations  GASTROINTESTINAL: No abdominal or epigastric pain. No nausea, vomiting, or hematemesis; No diarrhea or constipation. No melena or hematochezia.  GENITOURINARY: No dysuria, frequency or hematuria  NEUROLOGICAL: No numbness or weakness  SKIN: No itching, burning, rashes, or lesions   All other review of systems is negative unless indicated above.  PHYSICAL EXAM:    Vital Signs Last 24 Hrs  T(C): 36.6 (13 Jul 2018 14:24), Max: 37.4 (12 Jul 2018 20:21)  T(F): 97.8 (13 Jul 2018 14:24), Max: 99.3 (12 Jul 2018 20:21)  HR: 88 (13 Jul 2018 14:24) (66 - 96)  BP: 112/85 (13 Jul 2018 14:24) (112/85 - 137/73)  BP(mean): --  RR: 20 (13 Jul 2018 14:24) (18 - 20)  SpO2: 98% (13 Jul 2018 14:24) (95% - 99%)    Constitutional:    HEENT:awake  neck short/thy low lying/?scar  lungs harsh ae  cardia reg  abd soft  neuro deferred    Neck:  [  ] Supple  [  ]Lymphadenopathy  [  ] JVD   [  ]No JVD  [  ] Masses   [  ] WNL    CHEST/Respiratory:  [  ] Rales      [  ] Rhonchi      [  ] Wheezing       [  ] Chest Tenderness  [  ]Clear to auscultation    Cardiovascular:  [  ]S1 S2  [  ] Reg  [  ] Irreg   [  ] Murmurs  [  ]Systolic [  ]Diastolic  [  ]No Murmur    Abdomen:  [  ]  Bowel Sounds   [  ] Soft           [  ] ABD Distention  [  ] Tenderness  [  ] Organomegaly                        [  ] Guarding Rigidity  [  ] No Guarding Rigidity  [  ] Rebound Tenderness [  ] No Rebound Tenderness    Extremities: [  ] Edema  [  ] No edema  [  ] Clubbing   [  ] Cyanosis  [  ] Palpable peripheral pulses                        [  ] No Tender Calf muscles  [  ] Tender Calf muscles    Neurological:  [  ] Alert  [  ] Awake  [  ] Oriented  x                              [  ] Confused    Skin:  [  ] Thrombophlebitis  [  ] Rashes  [  ] Dry  [  ] Ulcers    Ortho:  [  ] Joint Swelling  [  ] Joint erythema [  ] DJD [  ] Increased temp. to touch      LABS/DIAGNOSTIC TESTS                          11.1   19.1  )-----------( 320      ( 13 Jul 2018 06:43 )             36.0     WBC Count: 19.1 K/uL (07-13 @ 06:43)  WBC Count: 14.8 K/uL (07-12 @ 06:35)  WBC Count: 13.7 K/uL (07-11 @ 06:49)      07-13    143  |  108  |  33<H>  ----------------------------<  137<H>  4.3   |  26  |  1.49<H>    Ca    10.1      13 Jul 2018 06:43  Phos  3.6     07-13                    LACTATE:      CULTURES:   acetaminophen   Tablet. 650 milliGRAM(s) Oral every 6 hours PRN  ALBUTerol/ipratropium for Nebulization 3 milliLiter(s) Nebulizer every 6 hours  anastrozole 1 milliGRAM(s) Oral daily  bethanechol 25 milliGRAM(s) Oral two times a day  cinacalcet 30 milliGRAM(s) Oral two times a day  Cyanocobalamin 100 MICROGram(s) 100 MICROGram(s) Oral daily  dextrose 5%. 1000 milliLiter(s) IV Continuous <Continuous>  ergocalciferol 83965 Unit(s) Oral <User Schedule>  heparin  Injectable 5000 Unit(s) SubCutaneous every 12 hours  loratadine 10 milliGRAM(s) Oral daily  pantoprazole    Tablet 40 milliGRAM(s) Oral before breakfast  predniSONE   Tablet 20 milliGRAM(s) Oral daily  simvastatin 40 milliGRAM(s) Oral at bedtime  trihexyphenidyl 1 milliGRAM(s) Oral three times a day      RADIOLOGY    CXR:

## 2018-07-13 NOTE — CONSULT NOTE ADULT - CONSULT REQUESTED DATE/TIME
07-Jul-2018 11:41
07-Jul-2018 23:54
10-Jul-2018
13-Jul-2018 18:18
10-Jul-2018 11:50
11-Jul-2018 15:10

## 2018-07-13 NOTE — PROGRESS NOTE ADULT - ASSESSMENT
78Y f, who walks with walker, with PMHx of COPD, Bipolar disorder, Parkinsonism, was brought to ER by EMS for unwitnessed fall in morning (07-).     Problem/Plan - 1:  ·  Problem: Fall, initial encounter.  Plan: Unwitnessed fall. Likely  mechanical as was trying to  cell phone.   Pain right knee. CT spine and head and X rays spine, chest and limb negative for fracture.  Ortho helping.   laceration 4cm just above left eye lid.  No  orthostatsis .   replacing Vit D.   PT helping.   TTE  noted and Carotid doppler noted      Problem/Plan - 2:  ·  Problem: UTI (urinary tract infection).  Plan: Finishing IV Rocephin . Still has Leucocytosis . Pt on steroid and no fever.    Problem/Plan - 3:  ·  Problem: Metabolic Encephalopathy with Unsteady Gait .  Plan: Resolved.  Secondary to Lithium toxicity . Holding Lithium .  Psychiatry to help with  change in her meds.      Problem/Plan - 4:  ·  Problem: Parkinson's disease.  Plan: Tremors upper extremity worse . Neurology helping. Changing Psychiatry meds.   on Trihexyphenidyl.      Problem/Plan - 5:  ·  Problem: HLD (hyperlipidemia).  Plan: pt taking simvastatin.      Problem/Plan - 6:  Problem: Bipolar 1 disorder. Plan: Off Lithium and level normal now . Psychiatry consulted.      Problem/Plan - 7:  ·  Problem: Chronic obstructive pulmonary disease, unspecified COPD type.  Plan: Stable .on ipratropium albuterol inhaler.  Tapering steroid  30mg daily x 3 days 20mg daily x3 days ,10mg daily x 3 days and then 5mg . F/U with PCP in few days after dc.     Problem/Plan - 8:  ·  Problem: Rt Knee pain .  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 Moderate medial and lateral compartment arthrosis. Moderate   joint effusion with numerous large ossified loose bodies.    No fracture.    < end of copied text >  Ortho consult noted and no intervention . PT consult  noted .       Problem/Plan - 9:  ·  Problem: CKD stage 3 with TAMARA  .  Plan: Renal helping. Improving.         Problem/Plan - 10:  ·  Problem: Electrolyte imbalance .  Plan: Correcting and Renal also helping. PTH and vit D level pending.         Problem/Plan - 11:  ·  Problem: Thyroid Nodules with high PTH .  Plan: Endo consulted.     Problem/Plan - 12:  ·  Problem: Prophylactic measure.  Plan: IMPROVE VTE; DVt prophylaxis.  Disposition ; Dc planning.

## 2018-07-14 LAB
ANION GAP SERPL CALC-SCNC: 8 MMOL/L — SIGNIFICANT CHANGE UP (ref 5–17)
BUN SERPL-MCNC: 42 MG/DL — HIGH (ref 7–18)
CALCIUM SERPL-MCNC: 9.2 MG/DL — SIGNIFICANT CHANGE UP (ref 8.4–10.5)
CHLORIDE SERPL-SCNC: 109 MMOL/L — HIGH (ref 96–108)
CO2 SERPL-SCNC: 24 MMOL/L — SIGNIFICANT CHANGE UP (ref 22–31)
CREAT SERPL-MCNC: 1.65 MG/DL — HIGH (ref 0.5–1.3)
GLUCOSE SERPL-MCNC: 121 MG/DL — HIGH (ref 70–99)
HCT VFR BLD CALC: 34.4 % — LOW (ref 34.5–45)
HGB BLD-MCNC: 11 G/DL — LOW (ref 11.5–15.5)
LITHIUM SERPL-MCNC: 0.6 MMOL/L — SIGNIFICANT CHANGE UP (ref 0.6–1.2)
MCHC RBC-ENTMCNC: 29.7 PG — SIGNIFICANT CHANGE UP (ref 27–34)
MCHC RBC-ENTMCNC: 31.8 GM/DL — LOW (ref 32–36)
MCV RBC AUTO: 93.2 FL — SIGNIFICANT CHANGE UP (ref 80–100)
PLATELET # BLD AUTO: 338 K/UL — SIGNIFICANT CHANGE UP (ref 150–400)
POTASSIUM SERPL-MCNC: 4.4 MMOL/L — SIGNIFICANT CHANGE UP (ref 3.5–5.3)
POTASSIUM SERPL-SCNC: 4.4 MMOL/L — SIGNIFICANT CHANGE UP (ref 3.5–5.3)
RBC # BLD: 3.69 M/UL — LOW (ref 3.8–5.2)
RBC # FLD: 14.6 % — HIGH (ref 10.3–14.5)
SODIUM SERPL-SCNC: 141 MMOL/L — SIGNIFICANT CHANGE UP (ref 135–145)
WBC # BLD: 22.9 K/UL — HIGH (ref 3.8–10.5)
WBC # FLD AUTO: 22.9 K/UL — HIGH (ref 3.8–10.5)

## 2018-07-14 RX ORDER — LITHIUM CARBONATE 300 MG/1
150 TABLET, EXTENDED RELEASE ORAL
Qty: 0 | Refills: 0 | Status: DISCONTINUED | OUTPATIENT
Start: 2018-07-14 | End: 2018-07-17

## 2018-07-14 RX ORDER — LITHIUM CARBONATE 300 MG/1
150 TABLET, EXTENDED RELEASE ORAL
Qty: 0 | Refills: 0 | Status: DISCONTINUED | OUTPATIENT
Start: 2018-07-14 | End: 2018-07-14

## 2018-07-14 RX ADMIN — Medication 3 MILLILITER(S): at 15:00

## 2018-07-14 RX ADMIN — Medication 20 MILLIGRAM(S): at 05:36

## 2018-07-14 RX ADMIN — HEPARIN SODIUM 5000 UNIT(S): 5000 INJECTION INTRAVENOUS; SUBCUTANEOUS at 05:37

## 2018-07-14 RX ADMIN — CINACALCET 30 MILLIGRAM(S): 30 TABLET, FILM COATED ORAL at 17:25

## 2018-07-14 RX ADMIN — Medication 3 MILLILITER(S): at 08:47

## 2018-07-14 RX ADMIN — Medication 1 MILLIGRAM(S): at 13:34

## 2018-07-14 RX ADMIN — Medication 25 MILLIGRAM(S): at 05:36

## 2018-07-14 RX ADMIN — PANTOPRAZOLE SODIUM 40 MILLIGRAM(S): 20 TABLET, DELAYED RELEASE ORAL at 05:36

## 2018-07-14 RX ADMIN — Medication 1 MILLIGRAM(S): at 21:29

## 2018-07-14 RX ADMIN — LITHIUM CARBONATE 150 MILLIGRAM(S): 300 TABLET, EXTENDED RELEASE ORAL at 17:25

## 2018-07-14 RX ADMIN — ANASTROZOLE 1 MILLIGRAM(S): 1 TABLET ORAL at 11:39

## 2018-07-14 RX ADMIN — Medication 25 MILLIGRAM(S): at 17:26

## 2018-07-14 RX ADMIN — HEPARIN SODIUM 5000 UNIT(S): 5000 INJECTION INTRAVENOUS; SUBCUTANEOUS at 17:26

## 2018-07-14 RX ADMIN — CINACALCET 30 MILLIGRAM(S): 30 TABLET, FILM COATED ORAL at 05:36

## 2018-07-14 RX ADMIN — LORATADINE 10 MILLIGRAM(S): 10 TABLET ORAL at 11:39

## 2018-07-14 RX ADMIN — SIMVASTATIN 40 MILLIGRAM(S): 20 TABLET, FILM COATED ORAL at 21:29

## 2018-07-14 RX ADMIN — Medication 1 MILLIGRAM(S): at 05:36

## 2018-07-14 RX ADMIN — Medication 3 MILLILITER(S): at 20:34

## 2018-07-14 NOTE — PROGRESS NOTE ADULT - PROBLEM SELECTOR PLAN 8
Improved behavior today  Risperdal/lithium being held while in hospital, per Dr. Ramos, psych  Lithium levels monitored  - dr. Ramos consulted to see patient if can restart

## 2018-07-14 NOTE — PROGRESS NOTE ADULT - SUBJECTIVE AND OBJECTIVE BOX
CHIEF COMPLAINT:Patient is a 78y old  Female who presents with a chief complaint of Unwitnessed Fall (09 Jul 2018 14:44)    	  REVIEW OF SYSTEMS:  CONSTITUTIONAL: No fever, weight loss, or fatigue  EYES: No eye pain, visual disturbances, or discharge  ENMT:  No difficulty hearing, tinnitus, vertigo; No sinus or throat pain  NECK: No pain or stiffness  RESPIRATORY: No cough, wheezing, chills or hemoptysis; No Shortness of Breath  CARDIOVASCULAR: No chest pain, palpitations, passing out, dizziness, or leg swelling  GASTROINTESTINAL: No abdominal or epigastric pain. No nausea, vomiting, or hematemesis; No diarrhea or constipation. No melena or hematochezia.  GENITOURINARY: No dysuria, frequency, hematuria, or incontinence  NEUROLOGICAL: No headaches, memory loss, loss of strength, numbness, or tremors  SKIN: No itching, burning, rashes, or lesions   LYMPH Nodes: No enlarged glands  ENDOCRINE: No heat or cold intolerance; No hair loss  MUSCULOSKELETAL: No joint pain or swelling; No muscle, back, or extremity pain  PSYCHIATRIC: No depression, anxiety, mood swings, or difficulty sleeping  HEME/LYMPH: No easy bruising, or bleeding gums  ALLERY AND IMMUNOLOGIC: No hives or eczema	    [ ] All others negative	  [ ] Unable to obtain    PHYSICAL EXAM:  T(C): 36.6 (07-14-18 @ 20:30), Max: 37.3 (07-14-18 @ 13:46)  HR: 90 (07-14-18 @ 20:30) (80 - 90)  BP: 137/76 (07-14-18 @ 20:30) (128/87 - 146/81)  RR: 18 (07-14-18 @ 20:30) (18 - 18)  SpO2: 99% (07-14-18 @ 20:30) (96% - 99%)  Wt(kg): --  I&O's Summary    13 Jul 2018 07:01  -  14 Jul 2018 07:00  --------------------------------------------------------  IN: 600 mL / OUT: 0 mL / NET: 600 mL    14 Jul 2018 07:01  -  14 Jul 2018 23:28  --------------------------------------------------------  IN: 890 mL / OUT: 0 mL / NET: 890 mL        Appearance: Normal	  HEENT:   Normal oral mucosa, PERRL, EOMI	  Lymphatic: No lymphadenopathy  Cardiovascular: Normal S1 S2, No JVD, No murmurs, No edema  Respiratory: Lungs clear to auscultation	  Psychiatry: A & O x 3, Mood & affect appropriate  Gastrointestinal:  Soft, Non-tender, + BS	  Skin: No rashes, No ecchymoses, No cyanosis	  Neurologic: Non-focal  Extremities: Normal range of motion, No clubbing, cyanosis or edema  Vascular: Peripheral pulses palpable 2+ bilaterally    MEDICATIONS  (STANDING):  ALBUTerol/ipratropium for Nebulization 3 milliLiter(s) Nebulizer every 6 hours  anastrozole 1 milliGRAM(s) Oral daily  bethanechol 25 milliGRAM(s) Oral two times a day  cinacalcet 30 milliGRAM(s) Oral two times a day  Cyanocobalamin 100 MICROGram(s) 100 MICROGram(s) Oral daily  ergocalciferol 38263 Unit(s) Oral <User Schedule>  heparin  Injectable 5000 Unit(s) SubCutaneous every 12 hours  lithium 150 milliGRAM(s) Oral two times a day  loratadine 10 milliGRAM(s) Oral daily  pantoprazole    Tablet 40 milliGRAM(s) Oral before breakfast  predniSONE   Tablet 20 milliGRAM(s) Oral daily  simvastatin 40 milliGRAM(s) Oral at bedtime  trihexyphenidyl 1 milliGRAM(s) Oral three times a day      TELEMETRY: 	    ECG:  	  RADIOLOGY:  OTHER: 	  	  CBC Full  -  ( 14 Jul 2018 07:49 )  WBC Count : 22.9 K/uL  Hemoglobin : 11.0 g/dL  Hematocrit : 34.4 %  Platelet Count - Automated : 338 K/uL  Mean Cell Volume : 93.2 fl  Mean Cell Hemoglobin : 29.7 pg  Mean Cell Hemoglobin Concentration : 31.8 gm/dL  Auto Neutrophil # : x  Auto Lymphocyte # : x  Auto Monocyte # : x  Auto Eosinophil # : x  Auto Basophil # : x  Auto Neutrophil % : x  Auto Lymphocyte % : x  Auto Monocyte % : x  Auto Eosinophil % : x  Auto Basophil % : x        CARDIAC MARKERS:                              11.0   22.9  )-----------( 338      ( 14 Jul 2018 07:49 )             34.4       07-14    141  |  109<H>  |  42<H>  ----------------------------<  121<H>  4.4   |  24  |  1.65<H>    Ca    9.2      14 Jul 2018 07:49  Phos  3.6     07-13              proBNP:   Lipid Profile:   HgA1c: Hemoglobin A1C, Whole Blood: 5.1 % (07-07 @ 09:35)    TSH: Thyroid Stimulating Hormone, Serum: 0.74 uU/mL (07-07 @ 08:02)

## 2018-07-14 NOTE — CHART NOTE - NSCHARTNOTEFT_GEN_A_CORE
As per discussion with Dr Flannery , Dr Fairbanks and Dr Botello   Lithium was resumed at half dose (150 mg) and the elevated WBCs might be 2/2 to Lithium and Steroids

## 2018-07-14 NOTE — PROGRESS NOTE ADULT - ASSESSMENT
A/P   PT  WITH  CKD  3    CR   FLUCTUATES  AROUND HER   BASELINE ,  IS AT  1.6  TODAY    VOL  STATUS IS  SATISFACTORY    BP  IS  WELL  [id number]  CA  BETTER , IS ON   SENSIPAR,    HAS HIGH  PTH ,   OUT OF PROPORTION  TO  THE  DEGREE OF   HER  RENAL INSUFF  FOLLOWED BY    ENDO  ENCOURAGE   INCREASE  [PO  FLUID INATKE  WILL  FOLLOW

## 2018-07-14 NOTE — PROGRESS NOTE ADULT - SUBJECTIVE AND OBJECTIVE BOX
Patient is a 78y Female with  CKD   SEC TO LITHIUM    HYPERCALCEMIA   OFFERS NO  COMPLAINTS AT  THE TIME  OF  EXAM    codeine (Unknown)  melatonin (Drowsiness)  Thorazine (Unknown)    Hospital Medications:   MEDICATIONS  (STANDING):  ALBUTerol/ipratropium for Nebulization 3 milliLiter(s) Nebulizer every 6 hours  anastrozole 1 milliGRAM(s) Oral daily  bethanechol 25 milliGRAM(s) Oral two times a day  cinacalcet 30 milliGRAM(s) Oral two times a day  Cyanocobalamin 100 MICROGram(s) 100 MICROGram(s) Oral daily  dextrose 5%. 1000 milliLiter(s) (75 mL/Hr) IV Continuous <Continuous>  ergocalciferol 13094 Unit(s) Oral <User Schedule>  heparin  Injectable 5000 Unit(s) SubCutaneous every 12 hours  lithium SR (LITHOBID) 150 milliGRAM(s) Oral two times a day  loratadine 10 milliGRAM(s) Oral daily  pantoprazole    Tablet 40 milliGRAM(s) Oral before breakfast  predniSONE   Tablet 20 milliGRAM(s) Oral daily  simvastatin 40 milliGRAM(s) Oral at bedtime  trihexyphenidyl 1 milliGRAM(s) Oral three times a day    REVIEW OF SYSTEMS:  APPEARS  COMFORTABLE    HAS NO   FEVER/CHILLS  OR  SOB    APPETITE IS OK    NO  N/V  VITALS:  T(F): 99.1 (07-14-18 @ 13:46), Max: 99.1 (07-14-18 @ 13:46)  HR: 85 (07-14-18 @ 13:46)  BP: 128/87 (07-14-18 @ 13:46)  RR: 18 (07-14-18 @ 13:46)  SpO2: 96% (07-14-18 @ 13:46)  Wt(kg): --    07-13 @ 07:01  -  07-14 @ 07:00  --------------------------------------------------------  IN: 600 mL / OUT: 0 mL / NET: 600 mL        PHYSICAL EXAM:  Constitutional: NAD  HEENT: CONJ  PINK  Neck: No JVD  Respiratory: CTAB, no wheezes, rales or rhonchi  Cardiovascular: S1, S2, RRR  Gastrointestinal: BS+, soft, NT/ND  Extremities: No peripheral edema  Neurological: A/O x 2  Psychiatric: FLAT AFFECT  :  No crisostomo.       LABS:  07-14    141  |  109<H>  |  42<H>  ----------------------------<  121<H>  4.4   |  24  |  1.65<H>    Ca    9.2      14 Jul 2018 07:49  Phos  3.6     07-13      Creatinine Trend: 1.65 <--, 1.49 <--, 1.64 <--, 1.47 <--, 1.68 <--, 1.59 <--, 1.49 <--, 1.46 <--                        11.0   22.9  )-----------( 338      ( 14 Jul 2018 07:49 )             34.4     Urine Studies:    Calcium, Random Urine: <1 mg/dL (07-12 @ 23:30)  Creatinine, Random Urine: 35 mg/dL (07-12 @ 18:30)  Osmolality, Random Urine: 232 mos/kg (07-10 @ 11:50)  Sodium, Random Urine: 72 mmol/L (07-10 @ 11:49)  Potassium, Random Urine: 8 mmol/L (07-10 @ 11:49)  Creatinine, Random Urine: <13 mg/dL (07-10 @ 11:49)  Chloride, Random Urine: 72 mmol/L (07-10 @ 11:49)    RADIOLOGY & ADDITIONAL STUDIES:

## 2018-07-15 LAB
ANION GAP SERPL CALC-SCNC: 9 MMOL/L — SIGNIFICANT CHANGE UP (ref 5–17)
BUN SERPL-MCNC: 40 MG/DL — HIGH (ref 7–18)
CALCIUM SERPL-MCNC: 9.1 MG/DL — SIGNIFICANT CHANGE UP (ref 8.4–10.5)
CHLORIDE SERPL-SCNC: 111 MMOL/L — HIGH (ref 96–108)
CO2 SERPL-SCNC: 24 MMOL/L — SIGNIFICANT CHANGE UP (ref 22–31)
CREAT SERPL-MCNC: 1.6 MG/DL — HIGH (ref 0.5–1.3)
GLUCOSE SERPL-MCNC: 112 MG/DL — HIGH (ref 70–99)
HCT VFR BLD CALC: 33.9 % — LOW (ref 34.5–45)
HGB BLD-MCNC: 10.7 G/DL — LOW (ref 11.5–15.5)
LITHIUM SERPL-MCNC: 0.9 MMOL/L — SIGNIFICANT CHANGE UP (ref 0.6–1.2)
MCHC RBC-ENTMCNC: 29.2 PG — SIGNIFICANT CHANGE UP (ref 27–34)
MCHC RBC-ENTMCNC: 31.5 GM/DL — LOW (ref 32–36)
MCV RBC AUTO: 92.6 FL — SIGNIFICANT CHANGE UP (ref 80–100)
PLATELET # BLD AUTO: 305 K/UL — SIGNIFICANT CHANGE UP (ref 150–400)
POTASSIUM SERPL-MCNC: 4.1 MMOL/L — SIGNIFICANT CHANGE UP (ref 3.5–5.3)
POTASSIUM SERPL-SCNC: 4.1 MMOL/L — SIGNIFICANT CHANGE UP (ref 3.5–5.3)
RBC # BLD: 3.66 M/UL — LOW (ref 3.8–5.2)
RBC # FLD: 14.6 % — HIGH (ref 10.3–14.5)
SODIUM SERPL-SCNC: 144 MMOL/L — SIGNIFICANT CHANGE UP (ref 135–145)
WBC # BLD: 19.5 K/UL — HIGH (ref 3.8–10.5)
WBC # FLD AUTO: 19.5 K/UL — HIGH (ref 3.8–10.5)

## 2018-07-15 RX ADMIN — CINACALCET 30 MILLIGRAM(S): 30 TABLET, FILM COATED ORAL at 17:43

## 2018-07-15 RX ADMIN — Medication 1 MILLIGRAM(S): at 22:38

## 2018-07-15 RX ADMIN — LORATADINE 10 MILLIGRAM(S): 10 TABLET ORAL at 12:03

## 2018-07-15 RX ADMIN — SIMVASTATIN 40 MILLIGRAM(S): 20 TABLET, FILM COATED ORAL at 22:38

## 2018-07-15 RX ADMIN — CINACALCET 30 MILLIGRAM(S): 30 TABLET, FILM COATED ORAL at 05:46

## 2018-07-15 RX ADMIN — ANASTROZOLE 1 MILLIGRAM(S): 1 TABLET ORAL at 12:03

## 2018-07-15 RX ADMIN — ERGOCALCIFEROL 50000 UNIT(S): 1.25 CAPSULE ORAL at 12:02

## 2018-07-15 RX ADMIN — Medication 25 MILLIGRAM(S): at 05:46

## 2018-07-15 RX ADMIN — LITHIUM CARBONATE 150 MILLIGRAM(S): 300 TABLET, EXTENDED RELEASE ORAL at 05:47

## 2018-07-15 RX ADMIN — Medication 20 MILLIGRAM(S): at 05:46

## 2018-07-15 RX ADMIN — Medication 1 MILLIGRAM(S): at 12:03

## 2018-07-15 RX ADMIN — Medication 3 MILLILITER(S): at 03:25

## 2018-07-15 RX ADMIN — Medication 25 MILLIGRAM(S): at 17:44

## 2018-07-15 RX ADMIN — Medication 3 MILLILITER(S): at 08:37

## 2018-07-15 RX ADMIN — HEPARIN SODIUM 5000 UNIT(S): 5000 INJECTION INTRAVENOUS; SUBCUTANEOUS at 05:47

## 2018-07-15 RX ADMIN — HEPARIN SODIUM 5000 UNIT(S): 5000 INJECTION INTRAVENOUS; SUBCUTANEOUS at 17:44

## 2018-07-15 RX ADMIN — Medication 3 MILLILITER(S): at 14:40

## 2018-07-15 RX ADMIN — Medication 1 MILLIGRAM(S): at 05:46

## 2018-07-15 RX ADMIN — PANTOPRAZOLE SODIUM 40 MILLIGRAM(S): 20 TABLET, DELAYED RELEASE ORAL at 05:47

## 2018-07-15 RX ADMIN — LITHIUM CARBONATE 150 MILLIGRAM(S): 300 TABLET, EXTENDED RELEASE ORAL at 17:44

## 2018-07-15 NOTE — PROGRESS NOTE ADULT - SUBJECTIVE AND OBJECTIVE BOX
CHIEF COMPLAINT:Patient is a 78y old  Female who presents with a chief complaint of Unwitnessed Fall (09 Jul 2018 14:44). Covering for Dr. Flannery.    	  REVIEW OF SYSTEMS:  CONSTITUTIONAL: No fever, weight loss, or fatigue  EYES: No eye pain, visual disturbances, or discharge  ENMT:  No difficulty hearing, tinnitus, vertigo; No sinus or throat pain  NECK: No pain or stiffness  RESPIRATORY: No cough, wheezing, chills or hemoptysis; No Shortness of Breath  CARDIOVASCULAR: No chest pain, palpitations, passing out, dizziness, or leg swelling  GASTROINTESTINAL: No abdominal or epigastric pain. No nausea, vomiting, or hematemesis; No diarrhea or constipation. No melena or hematochezia.  GENITOURINARY: No dysuria, frequency, hematuria, or incontinence  NEUROLOGICAL: No headaches, memory loss, loss of strength, numbness, or tremors  SKIN: No itching, burning, rashes, or lesions   LYMPH Nodes: No enlarged glands  ENDOCRINE: No heat or cold intolerance; No hair loss  MUSCULOSKELETAL: No joint pain or swelling; No muscle, back, or extremity pain  PSYCHIATRIC: No depression, anxiety, mood swings, or difficulty sleeping  HEME/LYMPH: No easy bruising, or bleeding gums  ALLERY AND IMMUNOLOGIC: No hives or eczema	    [ ] All others negative	  [ ] Unable to obtain    PHYSICAL EXAM:  T(C): 36.6 (07-15-18 @ 13:54), Max: 36.7 (07-15-18 @ 05:26)  HR: 108 (07-15-18 @ 13:54) (62 - 108)  BP: 121/72 (07-15-18 @ 13:54) (121/72 - 137/76)  RR: 18 (07-15-18 @ 13:54) (18 - 18)  SpO2: 98% (07-15-18 @ 13:54) (96% - 99%)  Wt(kg): --  I&O's Summary    14 Jul 2018 07:01  -  15 Jul 2018 07:00  --------------------------------------------------------  IN: 890 mL / OUT: 0 mL / NET: 890 mL        Appearance: Normal	  HEENT:   Normal oral mucosa, PERRL, EOMI	  Lymphatic: No lymphadenopathy  Cardiovascular: Normal S1 S2, No JVD, No murmurs, No edema  Respiratory: Lungs clear to auscultation	  Psychiatry: A & O x 3, Mood & affect appropriate  Gastrointestinal:  Soft, Non-tender, + BS	  Skin: No rashes, No ecchymoses, No cyanosis	  Neurologic: Non-focal  Extremities: Normal range of motion, No clubbing, cyanosis or edema  Vascular: Peripheral pulses palpable 2+ bilaterally    MEDICATIONS  (STANDING):  ALBUTerol/ipratropium for Nebulization 3 milliLiter(s) Nebulizer every 6 hours  anastrozole 1 milliGRAM(s) Oral daily  bethanechol 25 milliGRAM(s) Oral two times a day  cinacalcet 30 milliGRAM(s) Oral two times a day  Cyanocobalamin 100 MICROGram(s) 100 MICROGram(s) Oral daily  ergocalciferol 25025 Unit(s) Oral <User Schedule>  heparin  Injectable 5000 Unit(s) SubCutaneous every 12 hours  lithium 150 milliGRAM(s) Oral two times a day  loratadine 10 milliGRAM(s) Oral daily  pantoprazole    Tablet 40 milliGRAM(s) Oral before breakfast  predniSONE   Tablet 20 milliGRAM(s) Oral daily  simvastatin 40 milliGRAM(s) Oral at bedtime  trihexyphenidyl 1 milliGRAM(s) Oral three times a day      TELEMETRY: 	    ECG:  	  RADIOLOGY:  OTHER: 	  	  CBC Full  -  ( 15 Jul 2018 07:28 )  WBC Count : 19.5 K/uL  Hemoglobin : 10.7 g/dL  Hematocrit : 33.9 %  Platelet Count - Automated : 305 K/uL  Mean Cell Volume : 92.6 fl  Mean Cell Hemoglobin : 29.2 pg  Mean Cell Hemoglobin Concentration : 31.5 gm/dL  Auto Neutrophil # : x  Auto Lymphocyte # : x  Auto Monocyte # : x  Auto Eosinophil # : x  Auto Basophil # : x  Auto Neutrophil % : x  Auto Lymphocyte % : x  Auto Monocyte % : x  Auto Eosinophil % : x  Auto Basophil % : x        CARDIAC MARKERS:                              10.7   19.5  )-----------( 305      ( 15 Jul 2018 07:28 )             33.9       07-15    144  |  111<H>  |  40<H>  ----------------------------<  112<H>  4.1   |  24  |  1.60<H>    Ca    9.1      15 Jul 2018 07:28              proBNP:   Lipid Profile:   HgA1c: Hemoglobin A1C, Whole Blood: 5.1 % (07-07 @ 09:35)    TSH: Thyroid Stimulating Hormone, Serum: 0.74 uU/mL (07-07 @ 08:02)

## 2018-07-16 LAB
ANION GAP SERPL CALC-SCNC: 8 MMOL/L — SIGNIFICANT CHANGE UP (ref 5–17)
BUN SERPL-MCNC: 40 MG/DL — HIGH (ref 7–18)
CALCIUM SERPL-MCNC: 9.6 MG/DL — SIGNIFICANT CHANGE UP (ref 8.4–10.5)
CHLORIDE SERPL-SCNC: 111 MMOL/L — HIGH (ref 96–108)
CO2 SERPL-SCNC: 28 MMOL/L — SIGNIFICANT CHANGE UP (ref 22–31)
CREAT SERPL-MCNC: 1.54 MG/DL — HIGH (ref 0.5–1.3)
GLUCOSE SERPL-MCNC: 104 MG/DL — HIGH (ref 70–99)
HCT VFR BLD CALC: 34.7 % — SIGNIFICANT CHANGE UP (ref 34.5–45)
HGB BLD-MCNC: 10.9 G/DL — LOW (ref 11.5–15.5)
LITHIUM SERPL-MCNC: 0.8 MMOL/L — SIGNIFICANT CHANGE UP (ref 0.6–1.2)
MCHC RBC-ENTMCNC: 29.8 PG — SIGNIFICANT CHANGE UP (ref 27–34)
MCHC RBC-ENTMCNC: 31.4 GM/DL — LOW (ref 32–36)
MCV RBC AUTO: 94.9 FL — SIGNIFICANT CHANGE UP (ref 80–100)
PLATELET # BLD AUTO: 300 K/UL — SIGNIFICANT CHANGE UP (ref 150–400)
POTASSIUM SERPL-MCNC: 4.2 MMOL/L — SIGNIFICANT CHANGE UP (ref 3.5–5.3)
POTASSIUM SERPL-SCNC: 4.2 MMOL/L — SIGNIFICANT CHANGE UP (ref 3.5–5.3)
RBC # BLD: 3.66 M/UL — LOW (ref 3.8–5.2)
RBC # FLD: 15.1 % — HIGH (ref 10.3–14.5)
SODIUM SERPL-SCNC: 147 MMOL/L — HIGH (ref 135–145)
WBC # BLD: 20.9 K/UL — HIGH (ref 3.8–10.5)
WBC # FLD AUTO: 20.9 K/UL — HIGH (ref 3.8–10.5)

## 2018-07-16 RX ORDER — CINACALCET 30 MG/1
1 TABLET, FILM COATED ORAL
Qty: 0 | Refills: 0 | COMMUNITY
Start: 2018-07-16

## 2018-07-16 RX ORDER — SODIUM CHLORIDE 9 MG/ML
1000 INJECTION, SOLUTION INTRAVENOUS
Qty: 0 | Refills: 0 | Status: DISCONTINUED | OUTPATIENT
Start: 2018-07-16 | End: 2018-07-17

## 2018-07-16 RX ORDER — LITHIUM CARBONATE 300 MG/1
1 TABLET, EXTENDED RELEASE ORAL
Qty: 0 | Refills: 0 | COMMUNITY
Start: 2018-07-16

## 2018-07-16 RX ORDER — LITHIUM CARBONATE 300 MG/1
1 TABLET, EXTENDED RELEASE ORAL
Qty: 0 | Refills: 0 | COMMUNITY

## 2018-07-16 RX ORDER — RISPERIDONE 4 MG/1
1 TABLET ORAL
Qty: 0 | Refills: 0 | COMMUNITY

## 2018-07-16 RX ADMIN — PANTOPRAZOLE SODIUM 40 MILLIGRAM(S): 20 TABLET, DELAYED RELEASE ORAL at 06:49

## 2018-07-16 RX ADMIN — LITHIUM CARBONATE 150 MILLIGRAM(S): 300 TABLET, EXTENDED RELEASE ORAL at 18:18

## 2018-07-16 RX ADMIN — Medication 25 MILLIGRAM(S): at 18:17

## 2018-07-16 RX ADMIN — HEPARIN SODIUM 5000 UNIT(S): 5000 INJECTION INTRAVENOUS; SUBCUTANEOUS at 18:17

## 2018-07-16 RX ADMIN — LORATADINE 10 MILLIGRAM(S): 10 TABLET ORAL at 11:51

## 2018-07-16 RX ADMIN — Medication 3 MILLILITER(S): at 08:29

## 2018-07-16 RX ADMIN — Medication 1 MILLIGRAM(S): at 21:21

## 2018-07-16 RX ADMIN — CINACALCET 30 MILLIGRAM(S): 30 TABLET, FILM COATED ORAL at 06:49

## 2018-07-16 RX ADMIN — SODIUM CHLORIDE 75 MILLILITER(S): 9 INJECTION, SOLUTION INTRAVENOUS at 11:52

## 2018-07-16 RX ADMIN — SIMVASTATIN 40 MILLIGRAM(S): 20 TABLET, FILM COATED ORAL at 21:21

## 2018-07-16 RX ADMIN — Medication 20 MILLIGRAM(S): at 06:49

## 2018-07-16 RX ADMIN — Medication 1 MILLIGRAM(S): at 11:51

## 2018-07-16 RX ADMIN — ANASTROZOLE 1 MILLIGRAM(S): 1 TABLET ORAL at 11:51

## 2018-07-16 RX ADMIN — Medication 25 MILLIGRAM(S): at 06:49

## 2018-07-16 RX ADMIN — HEPARIN SODIUM 5000 UNIT(S): 5000 INJECTION INTRAVENOUS; SUBCUTANEOUS at 06:48

## 2018-07-16 RX ADMIN — Medication 1 MILLIGRAM(S): at 06:49

## 2018-07-16 RX ADMIN — CINACALCET 30 MILLIGRAM(S): 30 TABLET, FILM COATED ORAL at 18:17

## 2018-07-16 RX ADMIN — LITHIUM CARBONATE 150 MILLIGRAM(S): 300 TABLET, EXTENDED RELEASE ORAL at 06:48

## 2018-07-16 NOTE — PROGRESS NOTE ADULT - PROBLEM SELECTOR PROBLEM 4
Leukocytosis
Urinary tract infection without hematuria, site unspecified
Leukocytosis

## 2018-07-16 NOTE — PROGRESS NOTE ADULT - PROBLEM SELECTOR PLAN 10
ipratropium albuterol inhaler.
Sister, Brissa Lozano is emergency contact and daughter intially were screaming, wanting patient discharged back to [facility], with psych meds and without endo consult.  They stated patient was seen by an endocrinologist in the past and was worked up.  Daughter came up to see mother from [place], and she concurred.  However, they later agreed to endo consult (noting no weekend admissions to [facility]).  They stated pt's private psychiatrist will contact resident tomorrow regarding medication.  Possible discharge on Monday, unless US needle biopsy needed/pending Dr. Barros.
- not in exacerbation  - c/w gamaliel
Sister, Brissa Lozano is emergency contact and daughter intially were screaming, wanting patient discharged back to [facility], with psych meds and without endo consult.  They stated patient was seen by an endocrinologist in the past and was worked up.  Daughter came up to see mother from [place], and she concurred.  However, they later agreed to endo consult (noting no weekend admissions to [facility]).  They stated pt's private psychiatrist will contact resident tomorrow regarding medication.  Possible discharge on Monday, unless US needle biopsy needed/pending Dr. Barros.
Sister, Brissa Lozano is emergency contact and daughter intially were screaming, wanting patient discharged back to [facility], with psych meds and without endo consult.  They stated patient was seen by an endocrinologist in the past and was worked up.  Daughter came up to see mother from [place], and she concurred.  However, they later agreed to endo consult (noting no weekend admissions to [facility]).  They stated pt's private psychiatrist will contact resident tomorrow regarding medication.  Possible discharge on Monday, unless US needle biopsy needed/pending Dr. Barros.
Sister, Brissa Lozano is emergency contact and daughter intially were screaming, wanting patient discharged back to [facility], with psych meds and without endo consult.  They stated patient was seen by an endocrinologist in the past and was worked up.  Daughter came up to see mother from [place], and she concurred.  However, they later agreed to endo consult (noting no weekend admissions to [facility]).  They stated pt's private psychiatrist will contact resident tomorrow regarding medication.  Possible discharge on Monday, unless US needle biopsy needed/pending Dr. Barros.
ipratropium albuterol inhaler.

## 2018-07-16 NOTE — PROGRESS NOTE ADULT - PROBLEM SELECTOR PLAN 1
All studies negative for fracture  Rt knee pain and CT showed severe patellofemoral arthrosis with   chronic lateral subluxation of the patella.  Moderate medial and lateral  compartment arthrosis.  Moderate joint effusion with numerous large  ossified loose bodies.  No fracture  Ortho consulted and no intervention advised  Echo normal left ventricular function, grade 2 dd  carotid doppler showed no significant carotid stenosis  PT recommended home PT at [facility]
All studies negative for fracture  Rt knee pain and CT showed severe patellofemoral arthrosis with   chronic lateral subluxation of the patella.  Moderate medial and lateral  compartment arthrosis.  Moderate joint effusion with numerous large  ossified loose bodies.  No fracture  Ortho consulted and no intervention  Echo normal left ventricular function, grade 2 dd  carotid doppler showed no significant carotid stenosis  PT recommended home PT at [facility] facility patient came from
All studies negative for fracture  Rt knee pain and CT showed severe patellofemoral arthrosis with   chronic lateral subluxation of the patella.  Moderate medial and lateral  compartment arthrosis.  Moderate joint effusion with numerous large  ossified loose bodies.  No fracture  Ortho consulted and no intervention  Echo normal left ventricular function, grade 2 dd  carotid doppler showed no significant carotid stenosis  PT recommended home PT at [facility] facility patient came from
[facility] for pre-renal state. Has underlying CKD with bilateral atrophi kidneys
All studies negative for fracture  Rt knee pain and CT showed severe patellofemoral arthrosis with   chronic lateral subluxation of the patella.  Moderate medial and lateral  compartment arthrosis.  Moderate joint effusion with numerous large  ossified loose bodies.  No fracture  Ortho consulted and no intervention advised  Echo normal left ventricular function, grade 2 dd  carotid doppler showed no significant carotid stenosis  PT recommended home PT at [facility]

## 2018-07-16 NOTE — PROGRESS NOTE ADULT - PROBLEM SELECTOR PROBLEM 10
Discharge planning issues
Chronic obstructive pulmonary disease, unspecified COPD type
Discharge planning issues
Discharge planning issues
Chronic obstructive pulmonary disease, unspecified COPD type
Discharge planning issues
Chronic obstructive pulmonary disease, unspecified COPD type

## 2018-07-16 NOTE — PROGRESS NOTE ADULT - PROBLEM SELECTOR PROBLEM 3
Altered mental status
Hypernatremia
Altered mental status

## 2018-07-16 NOTE — PROGRESS NOTE ADULT - PROBLEM SELECTOR PLAN 4
WBC has been around 13215 to 43041  Pt afebrile/BC X 2 negative to date  Completed Rocephin  monitor
cont iv abx
- still with elevated   Pt afebrile/BC X 2 negative to date  Completed Rocephin  monitor
- still with elevated   Pt afebrile/BC X 2 negative to date  Completed Rocephin  monitor
- still with elevated wbc  Pt afebrile/BC X 2 negative to date  last day of Rocephin  monitor
- still with elevated   Pt afebrile/BC X 2 negative to date  Completed Rocephin  monitor
Elevated WBC of 14.4 (14.6)  Pt afebrile/BC X 2 negative to date  Dr. Fairbanks, ID, consulted and continued Ceftriaxone for possible nosocomial PNA/pending CXR  Pt refused CXR ([facility])
Elevated WBC of 13.7 (14.4)  Pt afebrile/BC X 2 negative to date  REYNA Álvarez, consulted and continued Ceftriaxone for possible nosocomial PNA  Repeat CXR showed no new changes since prior xray on 7/6.  REYNA Zavala, made  aware.  Continue Ceftriaxone until she consults w/Dr. Fairbanks ID

## 2018-07-16 NOTE — PROGRESS NOTE ADULT - ASSESSMENT
1.	Leukocytosis - stable (likely from steroids or lithium)  ·	remain off all antibiotics  ·	reconsult prn

## 2018-07-16 NOTE — PROGRESS NOTE ADULT - PROBLEM SELECTOR PROBLEM 2
UTI (urinary tract infection)
Hypercalcemia
UTI (urinary tract infection)

## 2018-07-16 NOTE — PROGRESS NOTE ADULT - PROBLEM SELECTOR PROBLEM 5
Acute kidney injury

## 2018-07-16 NOTE — PROGRESS NOTE ADULT - SUBJECTIVE AND OBJECTIVE BOX
CHIEF COMPLAINT:Patient is a 78y old  Female who presents with a chief complaint of Unwitnessed Fall (09 Jul 2018 14:44)    	  REVIEW OF SYSTEMS:  CONSTITUTIONAL: No fever, weight loss, or fatigue  EYES: No eye pain, visual disturbances, or discharge  ENMT:  No difficulty hearing, tinnitus, vertigo; No sinus or throat pain  NECK: No pain or stiffness  RESPIRATORY: No cough, wheezing, chills or hemoptysis; No Shortness of Breath  CARDIOVASCULAR: No chest pain, palpitations, passing out, dizziness, or leg swelling  GASTROINTESTINAL: No abdominal or epigastric pain. No nausea, vomiting, or hematemesis; No diarrhea or constipation. No melena or hematochezia.  GENITOURINARY: No dysuria, frequency, hematuria, or incontinence  NEUROLOGICAL: No headaches, memory loss, loss of strength, numbness, or tremors  SKIN: No itching, burning, rashes, or lesions   LYMPH Nodes: No enlarged glands  ENDOCRINE: No heat or cold intolerance; No hair loss  MUSCULOSKELETAL: No joint pain or swelling; No muscle, back, or extremity pain  PSYCHIATRIC: No depression, anxiety, mood swings, or difficulty sleeping  HEME/LYMPH: No easy bruising, or bleeding gums  ALLERY AND IMMUNOLOGIC: No hives or eczema	    [ ] All others negative	  [ ] Unable to obtain    PHYSICAL EXAM:  T(C): 36.1 (07-16-18 @ 21:00), Max: 36.4 (07-16-18 @ 06:25)  HR: 85 (07-16-18 @ 21:00) (85 - 105)  BP: 143/83 (07-16-18 @ 21:00) (124/52 - 143/83)  RR: 16 (07-16-18 @ 21:00) (16 - 20)  SpO2: 98% (07-16-18 @ 21:00) (98% - 99%)  Wt(kg): --  I&O's Summary      Appearance: Normal	  HEENT:   Normal oral mucosa, PERRL, EOMI	  Lymphatic: No lymphadenopathy  Cardiovascular: Normal S1 S2, No JVD, No murmurs, No edema  Respiratory: Lungs clear to auscultation	  Psychiatry: A & O x 3, Mood & affect appropriate  Gastrointestinal:  Soft, Non-tender, + BS	  Skin: No rashes, No ecchymoses, No cyanosis	  Neurologic: Non-focal  Extremities: Normal range of motion, No clubbing, cyanosis or edema  Vascular: Peripheral pulses palpable 2+ bilaterally    MEDICATIONS  (STANDING):  ALBUTerol/ipratropium for Nebulization 3 milliLiter(s) Nebulizer every 6 hours  anastrozole 1 milliGRAM(s) Oral daily  bethanechol 25 milliGRAM(s) Oral two times a day  cinacalcet 30 milliGRAM(s) Oral two times a day  Cyanocobalamin 100 MICROGram(s) 100 MICROGram(s) Oral daily  dextrose 5%. 1000 milliLiter(s) (75 mL/Hr) IV Continuous <Continuous>  ergocalciferol 40749 Unit(s) Oral <User Schedule>  heparin  Injectable 5000 Unit(s) SubCutaneous every 12 hours  lithium 150 milliGRAM(s) Oral two times a day  loratadine 10 milliGRAM(s) Oral daily  pantoprazole    Tablet 40 milliGRAM(s) Oral before breakfast  simvastatin 40 milliGRAM(s) Oral at bedtime  trihexyphenidyl 1 milliGRAM(s) Oral three times a day      TELEMETRY: 	    ECG:  	  RADIOLOGY:  OTHER: 	  	  CBC Full  -  ( 16 Jul 2018 07:10 )  WBC Count : 20.9 K/uL  Hemoglobin : 10.9 g/dL  Hematocrit : 34.7 %  Platelet Count - Automated : 300 K/uL  Mean Cell Volume : 94.9 fl  Mean Cell Hemoglobin : 29.8 pg  Mean Cell Hemoglobin Concentration : 31.4 gm/dL  Auto Neutrophil # : x  Auto Lymphocyte # : x  Auto Monocyte # : x  Auto Eosinophil # : x  Auto Basophil # : x  Auto Neutrophil % : x  Auto Lymphocyte % : x  Auto Monocyte % : x  Auto Eosinophil % : x  Auto Basophil % : x        CARDIAC MARKERS:                              10.9   20.9  )-----------( 300      ( 16 Jul 2018 07:10 )             34.7       07-16    147<H>  |  111<H>  |  40<H>  ----------------------------<  104<H>  4.2   |  28  |  1.54<H>    Ca    9.6      16 Jul 2018 07:10              proBNP:   Lipid Profile:   HgA1c: Hemoglobin A1C, Whole Blood: 5.1 % (07-07 @ 09:35)    TSH: Thyroid Stimulating Hormone, Serum: 0.74 uU/mL (07-07 @ 08:02)

## 2018-07-16 NOTE — PROGRESS NOTE ADULT - SUBJECTIVE AND OBJECTIVE BOX
78y Female is under our care for leukocytosis. Patient is stable with no overnight events. Patient is awake and somewhat alert, but no answering questions properly. No fevers, but wbc count fluctuates; likely from steroids or lithium. Renal function has been improving. Remains off antibiotics, no further ID followup needed.     REVIEW OF SYSTEMS:  [ X ] Not able to illicit    MEDS: no antibiotics     ALLERGIES: codeine (Unknown)  melatonin (Drowsiness)  Thorazine (Unknown)    VITALS:  Vital Signs Last 24 Hrs  T(C): 36.4 (16 Jul 2018 06:25), Max: 36.6 (15 Jul 2018 13:54)  T(F): 97.6 (16 Jul 2018 06:25), Max: 97.9 (15 Jul 2018 13:54)  HR: 105 (16 Jul 2018 06:25) (103 - 108)  BP: 125/86 (16 Jul 2018 06:25) (121/72 - 135/69)  BP(mean): --  RR: 18 (16 Jul 2018 06:25) (18 - 18)  SpO2: 98% (16 Jul 2018 06:25) (98% - 98%)      PHYSICAL EXAM:  HEENT: n/a  Neck: supple no LN's   Respiratory: bilateral rhoncherous sounds  Cardiovascular: S1 S2 reg no murmurs  Gastrointestinal: +BS with soft, mild distended abdomen; nontender  Extremities: no edema  Skin: no rashes  Ortho: n/a  Neuro: AAO x 1-2    LABS/DIAGNOSTIC TESTS:                        10.9   20.9  )-----------( 300      ( 16 Jul 2018 07:10 )             34.7     WBC Count: 20.9 K/uL (07-16 @ 07:10)  WBC Count: 19.5 K/uL (07-15 @ 07:28)  WBC Count: 22.9 K/uL (07-14 @ 07:49)  WBC Count: 19.1 K/uL (07-13 @ 06:43)  WBC Count: 14.8 K/uL (07-12 @ 06:35)    07-16    147<H>  |  111<H>  |  40<H>  ----------------------------<  104<H>  4.2   |  28  |  1.54<H> 1.60 < 1.65    Ca    9.6      16 Jul 2018 07:10        CULTURES:   .Urine Clean Catch (Midstream)  07-08 @ 12:53   <10,000 CFU/ml  Normal Urogenital jamee present  --  --      .Blood Blood  07-06 @ 23:40   No growth at 5 days.  --  --      .Blood Blood  07-06 @ 23:37   No growth at 5 days.  --  --        RADIOLOGY:  no new studies

## 2018-07-16 NOTE — PROGRESS NOTE ADULT - PROBLEM SELECTOR PROBLEM 7
Hypercalcemia
HLD (hyperlipidemia)
Hypercalcemia
Hypercalcemia

## 2018-07-16 NOTE — PROGRESS NOTE ADULT - PROBLEM SELECTOR PLAN 2
HYPERPERCALCEMIA SEC TO HYPERPARATHYROIDISM. LIKELY  LITHIUM RELATED OR UNMASKING OF UNDERLYING PRIMARY HYPERPARATHYROIDISM BY LITHIUM WITH INCREASED THRESHOLD FOR PTH SUPPRESION BY CASR.  SUGGEST NOT TO RESTART LITHIUM  START SENSIPAR 30MG BID  CHECK FRACTIONAL EXCRETION OF CALCIUM AND PHOS. PT INCONTINENCE AND THUS 24 HR CALCIUM NOT FEASIBLE  NECK US TO R/O PARATHYROID ADENOMA  OR CA.
UA positive  Ceftriaxone completed  Id dr. Fairbanks note is greatly appreciated. Spoke with Dr. Fairbanks, The leukocytosis is  likely due to steroids and lithium. Will however follow up clinically. Patient looks good, and just finished eating, and has a tonya smile.
UA positive  Ceftriaxone IV day 7/7 dc after today's dose  Id dr. Fairbanks appreciated
UA positive  Ceftriaxone completed  Id dr. Fairbanks appreciated
UA positive  Ceftriaxone completed  Id dr. Fairbanks appreciated
UA positive  Ceftriaxone completed  Id dr. Fairbanks note is greatly appreciated. Spoke with Dr. Fairbanks, The leukocytosis is  likely due to steroids and lithium. Will however follow up clinically. Patient looks good, and just finished eating, and has a tonya smile.
UA positive  Ceftriaxone IV day 4  UC negative/continued in light of leukocytosis
UA positive  Ceftriaxone IV day 4  UC negative/continued in light of leukocytosis

## 2018-07-16 NOTE — PROGRESS NOTE ADULT - ATTENDING COMMENTS
Agree with above assessment and plan as outlined above.    - No need for further inpatient cardiac work up.  - I will sign off please call back if needed    Kyler Huffman MD, FACC
CARDIOLOGY ATTENDING    Agree with above.     -given normal EKG her falls are unlikely from a malignant bradyarrhythmia  -given normal echo in Jan 2017 her falls are unlikely from a malignant ventricular arrhythmia  -can repeat echo, but would recommend outpatient event monitoring if repeat echo remains normal
Patient seen and examined, agree with above assessment and plan as transcribed above.    - outpatient event monitor  - ID and renal f/u    Kyler Huffman MD, FACC
CARDIOLOGY ATTENDING ADDENDUM    - Echo wnl  - plan for outpatient event monitor     Kyler Huffman MD, FACC
I agree with above
I agree with above
Sister adamant in getting her discharged today.
I agree with above
Patient is seen and examined. Case reviewed with the medical team. Above note is appreciated. Will follow up clinically. Continue DVT prophylaxis. Covering for Dr. Flannery. Patient is doing well. Case fully discussed with ID Dr. White. Patient will mild increase with lerkocytosis likely due to steroids . Will follow up clinically.
Patient is seen and examined. Case reviewed with the medical team. Above note is appreciated. Will follow up clinically. Continue DVT prophylaxis. Covering for Dr. Flannery. Patient is doing well. Case fully discussed with ID Dr. White. Patient will mild increase with lerkocytosis likely due to steroid. Patient is optimized for discharge. Patient needs to follow up for thyroid fine needle biopsy. Endocrine note is appreciated.
Patient is seen and examined. Case reviewed with the medical team. Above note is appreciated. Will follow up clinically. Continue DVT prophylaxis. Covering for Dr. Flannery. Patient is doing well. Case fully discussed with ID Dr. White. Patient will mild increase with lerkocytosis likely due to steroids . Will follow up clinically.

## 2018-07-16 NOTE — PROGRESS NOTE ADULT - PROBLEM SELECTOR PLAN 9
worsening tremors today  c/w Trihexyphenidyl tid  neurology dr. Farfan reconsulted to assist in management, and  hold lithium and risperdal while in hospital with followup MRI
worsening tremors today  c/w Trihexyphenidyl tid  neurology dr. Farfan reconsulted to assist in management, follow up recs
Trihexyphenidyl continued
worsening tremors today  c/w Trihexyphenidyl tid  neurology dr. Farfan reconsulted to assist in management, and  hold lithium and risperdal while in hospital with followup MRI
Trihexyphenidyl continued

## 2018-07-16 NOTE — PROGRESS NOTE ADULT - PROBLEM SELECTOR PROBLEM 6
Hypernatremia
Ambulatory dysfunction
Hypernatremia
Hypernatremia

## 2018-07-16 NOTE — PROGRESS NOTE ADULT - PROBLEM SELECTOR PLAN 5
acute on chronic kidney disease  stage 3  - renal sono noted  - likely 2/2 poor oral intake and lithium toxicity    renal dr. Tellez following, appreciated  f/u bmp daily

## 2018-07-16 NOTE — PROGRESS NOTE ADULT - PROBLEM SELECTOR PROBLEM 1
Fall, initial encounter
Tremor
Fall, initial encounter
Fall, initial encounter
Acute kidney injury
Fall, initial encounter

## 2018-07-17 VITALS
RESPIRATION RATE: 16 BRPM | TEMPERATURE: 98 F | DIASTOLIC BLOOD PRESSURE: 62 MMHG | SYSTOLIC BLOOD PRESSURE: 138 MMHG | HEART RATE: 85 BPM | OXYGEN SATURATION: 98 %

## 2018-07-17 LAB
ANION GAP SERPL CALC-SCNC: 7 MMOL/L — SIGNIFICANT CHANGE UP (ref 5–17)
BUN SERPL-MCNC: 37 MG/DL — HIGH (ref 7–18)
CALCIUM SERPL-MCNC: 8.6 MG/DL — SIGNIFICANT CHANGE UP (ref 8.4–10.5)
CHLORIDE SERPL-SCNC: 104 MMOL/L — SIGNIFICANT CHANGE UP (ref 96–108)
CO2 SERPL-SCNC: 27 MMOL/L — SIGNIFICANT CHANGE UP (ref 22–31)
CREAT SERPL-MCNC: 1.42 MG/DL — HIGH (ref 0.5–1.3)
GLUCOSE SERPL-MCNC: 115 MG/DL — HIGH (ref 70–99)
HCT VFR BLD CALC: 31.5 % — LOW (ref 34.5–45)
HGB BLD-MCNC: 10.1 G/DL — LOW (ref 11.5–15.5)
LITHIUM SERPL-MCNC: 0.8 MMOL/L — SIGNIFICANT CHANGE UP (ref 0.6–1.2)
MCHC RBC-ENTMCNC: 29.9 PG — SIGNIFICANT CHANGE UP (ref 27–34)
MCHC RBC-ENTMCNC: 32.2 GM/DL — SIGNIFICANT CHANGE UP (ref 32–36)
MCV RBC AUTO: 92.9 FL — SIGNIFICANT CHANGE UP (ref 80–100)
PLATELET # BLD AUTO: 251 K/UL — SIGNIFICANT CHANGE UP (ref 150–400)
POTASSIUM SERPL-MCNC: 4.1 MMOL/L — SIGNIFICANT CHANGE UP (ref 3.5–5.3)
POTASSIUM SERPL-SCNC: 4.1 MMOL/L — SIGNIFICANT CHANGE UP (ref 3.5–5.3)
RBC # BLD: 3.39 M/UL — LOW (ref 3.8–5.2)
RBC # FLD: 14.5 % — SIGNIFICANT CHANGE UP (ref 10.3–14.5)
SODIUM SERPL-SCNC: 138 MMOL/L — SIGNIFICANT CHANGE UP (ref 135–145)
WBC # BLD: 21.6 K/UL — HIGH (ref 3.8–10.5)
WBC # FLD AUTO: 21.6 K/UL — HIGH (ref 3.8–10.5)

## 2018-07-17 PROCEDURE — 82340 ASSAY OF CALCIUM IN URINE: CPT

## 2018-07-17 PROCEDURE — 96374 THER/PROPH/DIAG INJ IV PUSH: CPT

## 2018-07-17 PROCEDURE — 94640 AIRWAY INHALATION TREATMENT: CPT

## 2018-07-17 PROCEDURE — 82607 VITAMIN B-12: CPT

## 2018-07-17 PROCEDURE — 84105 ASSAY OF URINE PHOSPHORUS: CPT

## 2018-07-17 PROCEDURE — 84145 PROCALCITONIN (PCT): CPT

## 2018-07-17 PROCEDURE — 84484 ASSAY OF TROPONIN QUANT: CPT

## 2018-07-17 PROCEDURE — 82803 BLOOD GASES ANY COMBINATION: CPT

## 2018-07-17 PROCEDURE — 83935 ASSAY OF URINE OSMOLALITY: CPT

## 2018-07-17 PROCEDURE — 82652 VIT D 1 25-DIHYDROXY: CPT

## 2018-07-17 PROCEDURE — 93880 EXTRACRANIAL BILAT STUDY: CPT

## 2018-07-17 PROCEDURE — 71045 X-RAY EXAM CHEST 1 VIEW: CPT

## 2018-07-17 PROCEDURE — 93306 TTE W/DOPPLER COMPLETE: CPT

## 2018-07-17 PROCEDURE — 87040 BLOOD CULTURE FOR BACTERIA: CPT

## 2018-07-17 PROCEDURE — 90715 TDAP VACCINE 7 YRS/> IM: CPT

## 2018-07-17 PROCEDURE — 83970 ASSAY OF PARATHORMONE: CPT

## 2018-07-17 PROCEDURE — 81001 URINALYSIS AUTO W/SCOPE: CPT

## 2018-07-17 PROCEDURE — 73700 CT LOWER EXTREMITY W/O DYE: CPT

## 2018-07-17 PROCEDURE — 93005 ELECTROCARDIOGRAM TRACING: CPT

## 2018-07-17 PROCEDURE — 85610 PROTHROMBIN TIME: CPT

## 2018-07-17 PROCEDURE — 80053 COMPREHEN METABOLIC PANEL: CPT

## 2018-07-17 PROCEDURE — 82550 ASSAY OF CK (CPK): CPT

## 2018-07-17 PROCEDURE — 97162 PT EVAL MOD COMPLEX 30 MIN: CPT

## 2018-07-17 PROCEDURE — 90471 IMMUNIZATION ADMIN: CPT

## 2018-07-17 PROCEDURE — 82570 ASSAY OF URINE CREATININE: CPT

## 2018-07-17 PROCEDURE — 80048 BASIC METABOLIC PNL TOTAL CA: CPT

## 2018-07-17 PROCEDURE — 73552 X-RAY EXAM OF FEMUR 2/>: CPT

## 2018-07-17 PROCEDURE — 84100 ASSAY OF PHOSPHORUS: CPT

## 2018-07-17 PROCEDURE — 76536 US EXAM OF HEAD AND NECK: CPT

## 2018-07-17 PROCEDURE — 73562 X-RAY EXAM OF KNEE 3: CPT

## 2018-07-17 PROCEDURE — 80178 ASSAY OF LITHIUM: CPT

## 2018-07-17 PROCEDURE — 87086 URINE CULTURE/COLONY COUNT: CPT

## 2018-07-17 PROCEDURE — 83735 ASSAY OF MAGNESIUM: CPT

## 2018-07-17 PROCEDURE — 82306 VITAMIN D 25 HYDROXY: CPT

## 2018-07-17 PROCEDURE — 73502 X-RAY EXAM HIP UNI 2-3 VIEWS: CPT

## 2018-07-17 PROCEDURE — 84443 ASSAY THYROID STIM HORMONE: CPT

## 2018-07-17 PROCEDURE — 82436 ASSAY OF URINE CHLORIDE: CPT

## 2018-07-17 PROCEDURE — 76775 US EXAM ABDO BACK WALL LIM: CPT

## 2018-07-17 PROCEDURE — 85730 THROMBOPLASTIN TIME PARTIAL: CPT

## 2018-07-17 PROCEDURE — 82310 ASSAY OF CALCIUM: CPT

## 2018-07-17 PROCEDURE — 85027 COMPLETE CBC AUTOMATED: CPT

## 2018-07-17 PROCEDURE — 99285 EMERGENCY DEPT VISIT HI MDM: CPT | Mod: 25

## 2018-07-17 PROCEDURE — 70450 CT HEAD/BRAIN W/O DYE: CPT

## 2018-07-17 PROCEDURE — 83519 RIA NONANTIBODY: CPT

## 2018-07-17 PROCEDURE — 84133 ASSAY OF URINE POTASSIUM: CPT

## 2018-07-17 PROCEDURE — 83036 HEMOGLOBIN GLYCOSYLATED A1C: CPT

## 2018-07-17 PROCEDURE — 72125 CT NECK SPINE W/O DYE: CPT

## 2018-07-17 PROCEDURE — 84300 ASSAY OF URINE SODIUM: CPT

## 2018-07-17 RX ORDER — OMEPRAZOLE 10 MG/1
1 CAPSULE, DELAYED RELEASE ORAL
Qty: 30 | Refills: 0 | OUTPATIENT
Start: 2018-07-17 | End: 2018-08-15

## 2018-07-17 RX ORDER — CINACALCET 30 MG/1
1 TABLET, FILM COATED ORAL
Qty: 30 | Refills: 0 | OUTPATIENT
Start: 2018-07-17 | End: 2018-08-15

## 2018-07-17 RX ORDER — IPRATROPIUM/ALBUTEROL SULFATE 18-103MCG
3 AEROSOL WITH ADAPTER (GRAM) INHALATION
Qty: 1 | Refills: 0 | OUTPATIENT
Start: 2018-07-17 | End: 2018-08-15

## 2018-07-17 RX ORDER — OMEPRAZOLE 10 MG/1
1 CAPSULE, DELAYED RELEASE ORAL
Qty: 0 | Refills: 0 | COMMUNITY

## 2018-07-17 RX ORDER — LITHIUM CARBONATE 300 MG/1
1 TABLET, EXTENDED RELEASE ORAL
Qty: 60 | Refills: 0 | OUTPATIENT
Start: 2018-07-17 | End: 2018-08-15

## 2018-07-17 RX ORDER — TRIHEXYPHENIDYL HCL 2 MG
0.5 TABLET ORAL
Qty: 45 | Refills: 0 | OUTPATIENT
Start: 2018-07-17 | End: 2018-08-15

## 2018-07-17 RX ORDER — TRIHEXYPHENIDYL HCL 2 MG
0.5 TABLET ORAL
Qty: 0 | Refills: 0 | COMMUNITY

## 2018-07-17 RX ORDER — BETHANECHOL CHLORIDE 25 MG
1 TABLET ORAL
Qty: 0 | Refills: 0 | COMMUNITY

## 2018-07-17 RX ORDER — IPRATROPIUM/ALBUTEROL SULFATE 18-103MCG
3 AEROSOL WITH ADAPTER (GRAM) INHALATION
Qty: 0 | Refills: 0 | COMMUNITY

## 2018-07-17 RX ORDER — BETHANECHOL CHLORIDE 25 MG
1 TABLET ORAL
Qty: 60 | Refills: 0 | OUTPATIENT
Start: 2018-07-17 | End: 2018-08-15

## 2018-07-17 RX ADMIN — HEPARIN SODIUM 5000 UNIT(S): 5000 INJECTION INTRAVENOUS; SUBCUTANEOUS at 05:33

## 2018-07-17 RX ADMIN — Medication 25 MILLIGRAM(S): at 05:33

## 2018-07-17 RX ADMIN — ANASTROZOLE 1 MILLIGRAM(S): 1 TABLET ORAL at 13:26

## 2018-07-17 RX ADMIN — Medication 25 MILLIGRAM(S): at 18:50

## 2018-07-17 RX ADMIN — CINACALCET 30 MILLIGRAM(S): 30 TABLET, FILM COATED ORAL at 05:33

## 2018-07-17 RX ADMIN — LITHIUM CARBONATE 150 MILLIGRAM(S): 300 TABLET, EXTENDED RELEASE ORAL at 18:50

## 2018-07-17 RX ADMIN — Medication 1 MILLIGRAM(S): at 13:26

## 2018-07-17 RX ADMIN — CINACALCET 30 MILLIGRAM(S): 30 TABLET, FILM COATED ORAL at 18:50

## 2018-07-17 RX ADMIN — Medication 1 MILLIGRAM(S): at 05:33

## 2018-07-17 RX ADMIN — LITHIUM CARBONATE 150 MILLIGRAM(S): 300 TABLET, EXTENDED RELEASE ORAL at 05:33

## 2018-07-17 RX ADMIN — LORATADINE 10 MILLIGRAM(S): 10 TABLET ORAL at 13:26

## 2018-07-17 RX ADMIN — PANTOPRAZOLE SODIUM 40 MILLIGRAM(S): 20 TABLET, DELAYED RELEASE ORAL at 05:33

## 2018-07-17 NOTE — PROGRESS NOTE ADULT - SUBJECTIVE AND OBJECTIVE BOX
no events overnight    codeine (Unknown)  melatonin (Drowsiness)  Thorazine (Unknown)    Hospital Medications:   MEDICATIONS  (STANDING):  ALBUTerol/ipratropium for Nebulization 3 milliLiter(s) Nebulizer every 6 hours  anastrozole 1 milliGRAM(s) Oral daily  bethanechol 25 milliGRAM(s) Oral two times a day  cinacalcet 30 milliGRAM(s) Oral two times a day  Cyanocobalamin 100 MICROGram(s) 100 MICROGram(s) Oral daily  dextrose 5%. 1000 milliLiter(s) (75 mL/Hr) IV Continuous <Continuous>  ergocalciferol 36778 Unit(s) Oral <User Schedule>  heparin  Injectable 5000 Unit(s) SubCutaneous every 12 hours  lithium 150 milliGRAM(s) Oral two times a day  loratadine 10 milliGRAM(s) Oral daily  pantoprazole    Tablet 40 milliGRAM(s) Oral before breakfast  simvastatin 40 milliGRAM(s) Oral at bedtime  trihexyphenidyl 1 milliGRAM(s) Oral three times a day        VITALS:  T(F): 97.8 (07-17-18 @ 14:47), Max: 97.8 (07-17-18 @ 14:47)  HR: 85 (07-17-18 @ 14:47)  BP: 138/62 (07-17-18 @ 14:47)  RR: 16 (07-17-18 @ 14:47)  SpO2: 98% (07-17-18 @ 14:47)  Wt(kg): --      PHYSICAL EXAM:  Constitutional: NAD  HEENT: anicteric sclera, oropharynx clear.  Neck: No JVD  Respiratory: CTAB, no wheezes, rales or rhonchi  Cardiovascular: S1, S2, RRR  Gastrointestinal: BS+, soft, NT/ND  Extremities: No cyanosis or clubbing. No peripheral edema  : No CVA tenderness. No crisostomo.       LABS:  07-17    138  |  104  |  37<H>  ----------------------------<  115<H>  4.1   |  27  |  1.42<H>    Ca    8.6      17 Jul 2018 07:37      Creatinine Trend: 1.42 <--, 1.54 <--, 1.60 <--, 1.65 <--, 1.49 <--, 1.64 <--, 1.47 <--, 1.68 <--                        10.1   21.6  )-----------( 251      ( 17 Jul 2018 07:37 )             31.5     Urine Studies:    Calcium, Random Urine: <1 mg/dL (07-12 @ 23:30)  Creatinine, Random Urine: 35 mg/dL (07-12 @ 18:30)    RADIOLOGY & ADDITIONAL STUDIES:

## 2018-07-17 NOTE — PROGRESS NOTE ADULT - PROVIDER SPECIALTY LIST ADULT
Cardiology
Infectious Disease
Internal Medicine
Nephrology
Orthopedics
Cardiology
Internal Medicine
Internal Medicine
Nephrology
Neurology
Nephrology
Internal Medicine

## 2018-07-17 NOTE — PROGRESS NOTE ADULT - ASSESSMENT
78 yr old female with CKD.  Elevated PTH out of proportion to to renal failure.  on lithium  hypercalcemia improved with sensipar  suggest decr sensipar to 30mg daily upon discharge

## 2018-07-23 LAB
PTH RELATED PROT SERPL-MCNC: <2 PMOL/L — SIGNIFICANT CHANGE UP
PTH RELATED PROT SERPL-MCNC: <2 PMOL/L — SIGNIFICANT CHANGE UP

## 2018-07-27 ENCOUNTER — APPOINTMENT (OUTPATIENT)
Dept: NEUROLOGY | Facility: CLINIC | Age: 78
End: 2018-07-27
Payer: MEDICARE

## 2018-07-27 VITALS
HEART RATE: 92 BPM | HEIGHT: 59 IN | TEMPERATURE: 97.6 F | SYSTOLIC BLOOD PRESSURE: 112 MMHG | OXYGEN SATURATION: 97 % | DIASTOLIC BLOOD PRESSURE: 72 MMHG | WEIGHT: 128 LBS | BODY MASS INDEX: 25.8 KG/M2

## 2018-07-27 DIAGNOSIS — R25.1 TREMOR, UNSPECIFIED: ICD-10-CM

## 2018-07-27 PROCEDURE — 99214 OFFICE O/P EST MOD 30 MIN: CPT

## 2018-08-07 ENCOUNTER — APPOINTMENT (OUTPATIENT)
Dept: MRI IMAGING | Facility: HOSPITAL | Age: 78
End: 2018-08-07

## 2018-11-20 NOTE — PHYSICAL THERAPY INITIAL EVALUATION ADULT - SOCIAL CONCERNS
Reason For Visit  CARLYLE DURAND is here today for a nurse visit for immunizations Flu vaccine.      Screening  Vaccine Screening (Peds):   1. Is the child sick today? No.   2. Does the child have allergies to medications, food, a vaccine component, or latex? No.   3. Has the child had a serious reaction to a vaccine in the past? No.   4. Has the child had a health problem with lung, heart, kidney or metabolic disease (e.g. diabetes), asthma, or a blood disorder? Is he/she on a long-term aspirin therapy? No.   5. Do you have cancer, leukemia, HIV/AIDS, or any other immune system problem? No.   6. If your daniel is a baby, have you ever been told he or she has had intussusception? No.   7. Has the child, a sibling, or a parent had a seizure; has the child had brain or other nervous system problems? No.   8. Does the child have cancer, leukemia, HIV/AIDS, or any other immune system problem? No.   9. In the past 3 months, has the child taken medications that affect the immune system such as prednisone, other steroids, or anticancer drugs; drugs for the treatment of rheumatoid arthritis, Crohn's disease, or psoriasis; or had radiation treatments? No.   10. In the past year, has the child received a transfusion of blood or blood products, or been given immune (gamma) globulin or an antiviral drug? No.   11. Is the child/teen pregnant or is there a chance she could become pregnant during the next month? No.   12. Has the child received vaccinations in the past 4 weeks? No.     Reviewed Potential Contraindications     See scanned screening form in the patients chart.   Screening questions and answeres reviewed by the Provider.      Plan    Patient Instructions Mother took daughter in requesting flu vaccine  Flulaval 0.5 ML given in left deltoid muscle, tolerated well  May apply warm compress to injection site as needed  May give OTC pain medication as directed by manufacture.   Return to Clinic as needed.       Signatures   Electronically signed by : REINA GUNN, ; Jan 29 2018  5:41PM CST     None

## 2018-12-04 ENCOUNTER — INPATIENT (INPATIENT)
Facility: HOSPITAL | Age: 78
LOS: 5 days | Discharge: NURSING, REHAB OR LONG-TERM CARE | DRG: 194 | End: 2018-12-10
Attending: INTERNAL MEDICINE | Admitting: INTERNAL MEDICINE
Payer: MEDICARE

## 2018-12-04 VITALS
HEART RATE: 86 BPM | TEMPERATURE: 98 F | OXYGEN SATURATION: 95 % | HEIGHT: 58 IN | DIASTOLIC BLOOD PRESSURE: 84 MMHG | SYSTOLIC BLOOD PRESSURE: 136 MMHG | WEIGHT: 104.94 LBS | RESPIRATION RATE: 17 BRPM

## 2018-12-04 DIAGNOSIS — Z29.9 ENCOUNTER FOR PROPHYLACTIC MEASURES, UNSPECIFIED: ICD-10-CM

## 2018-12-04 DIAGNOSIS — J18.9 PNEUMONIA, UNSPECIFIED ORGANISM: ICD-10-CM

## 2018-12-04 DIAGNOSIS — F31.9 BIPOLAR DISORDER, UNSPECIFIED: ICD-10-CM

## 2018-12-04 DIAGNOSIS — J44.1 CHRONIC OBSTRUCTIVE PULMONARY DISEASE WITH (ACUTE) EXACERBATION: ICD-10-CM

## 2018-12-04 DIAGNOSIS — N28.9 DISORDER OF KIDNEY AND URETER, UNSPECIFIED: ICD-10-CM

## 2018-12-04 DIAGNOSIS — G20 PARKINSON'S DISEASE: ICD-10-CM

## 2018-12-04 DIAGNOSIS — J44.9 CHRONIC OBSTRUCTIVE PULMONARY DISEASE, UNSPECIFIED: ICD-10-CM

## 2018-12-04 LAB
ACETONE SERPL-MCNC: NEGATIVE — SIGNIFICANT CHANGE UP
ALBUMIN SERPL ELPH-MCNC: 3.1 G/DL — LOW (ref 3.5–5)
ALP SERPL-CCNC: 112 U/L — SIGNIFICANT CHANGE UP (ref 40–120)
ALT FLD-CCNC: 13 U/L DA — SIGNIFICANT CHANGE UP (ref 10–60)
ANION GAP SERPL CALC-SCNC: 11 MMOL/L — SIGNIFICANT CHANGE UP (ref 5–17)
APPEARANCE UR: CLEAR — SIGNIFICANT CHANGE UP
AST SERPL-CCNC: 12 U/L — SIGNIFICANT CHANGE UP (ref 10–40)
BACTERIA # UR AUTO: ABNORMAL /HPF
BILIRUB SERPL-MCNC: 0.6 MG/DL — SIGNIFICANT CHANGE UP (ref 0.2–1.2)
BILIRUB UR-MCNC: NEGATIVE — SIGNIFICANT CHANGE UP
BUN SERPL-MCNC: 21 MG/DL — HIGH (ref 7–18)
CALCIUM SERPL-MCNC: 9.4 MG/DL — SIGNIFICANT CHANGE UP (ref 8.4–10.5)
CHLORIDE SERPL-SCNC: 108 MMOL/L — SIGNIFICANT CHANGE UP (ref 96–108)
CO2 SERPL-SCNC: 25 MMOL/L — SIGNIFICANT CHANGE UP (ref 22–31)
COLOR SPEC: YELLOW — SIGNIFICANT CHANGE UP
COMMENT - URINE: SIGNIFICANT CHANGE UP
CREAT SERPL-MCNC: 1.46 MG/DL — HIGH (ref 0.5–1.3)
DIFF PNL FLD: NEGATIVE — SIGNIFICANT CHANGE UP
EPI CELLS # UR: ABNORMAL /HPF
GLUCOSE SERPL-MCNC: 105 MG/DL — HIGH (ref 70–99)
GLUCOSE UR QL: NEGATIVE — SIGNIFICANT CHANGE UP
HCT VFR BLD CALC: 36.4 % — SIGNIFICANT CHANGE UP (ref 34.5–45)
HGB BLD-MCNC: 11 G/DL — LOW (ref 11.5–15.5)
KETONES UR-MCNC: NEGATIVE — SIGNIFICANT CHANGE UP
LACTATE SERPL-SCNC: 1.5 MMOL/L — SIGNIFICANT CHANGE UP (ref 0.7–2)
LEUKOCYTE ESTERASE UR-ACNC: NEGATIVE — SIGNIFICANT CHANGE UP
LITHIUM SERPL-MCNC: 0.9 MMOL/L — SIGNIFICANT CHANGE UP (ref 0.6–1.2)
LYMPHOCYTES # BLD AUTO: 10 % — LOW (ref 13–44)
MAGNESIUM SERPL-MCNC: 2.3 MG/DL — SIGNIFICANT CHANGE UP (ref 1.6–2.6)
MANUAL DIF COMMENT BLD-IMP: SIGNIFICANT CHANGE UP
MCHC RBC-ENTMCNC: 27.4 PG — SIGNIFICANT CHANGE UP (ref 27–34)
MCHC RBC-ENTMCNC: 30.1 GM/DL — LOW (ref 32–36)
MCV RBC AUTO: 91.2 FL — SIGNIFICANT CHANGE UP (ref 80–100)
MONOCYTES NFR BLD AUTO: 7 % — SIGNIFICANT CHANGE UP (ref 2–14)
MYELOCYTES NFR BLD: 1 % — HIGH (ref 0–0)
NEUTROPHILS NFR BLD AUTO: 82 % — HIGH (ref 43–77)
NITRITE UR-MCNC: NEGATIVE — SIGNIFICANT CHANGE UP
NT-PROBNP SERPL-SCNC: 866 PG/ML — HIGH (ref 0–450)
PH UR: 6 — SIGNIFICANT CHANGE UP (ref 5–8)
PLAT MORPH BLD: NORMAL — SIGNIFICANT CHANGE UP
PLATELET # BLD AUTO: 352 K/UL — SIGNIFICANT CHANGE UP (ref 150–400)
POTASSIUM SERPL-MCNC: 3.9 MMOL/L — SIGNIFICANT CHANGE UP (ref 3.5–5.3)
POTASSIUM SERPL-SCNC: 3.9 MMOL/L — SIGNIFICANT CHANGE UP (ref 3.5–5.3)
PROT SERPL-MCNC: 8.2 G/DL — SIGNIFICANT CHANGE UP (ref 6–8.3)
PROT UR-MCNC: 15
RAPID RVP RESULT: SIGNIFICANT CHANGE UP
RBC # BLD: 4 M/UL — SIGNIFICANT CHANGE UP (ref 3.8–5.2)
RBC # FLD: 15.1 % — HIGH (ref 10.3–14.5)
RBC BLD AUTO: NORMAL — SIGNIFICANT CHANGE UP
RBC CASTS # UR COMP ASSIST: SIGNIFICANT CHANGE UP /HPF (ref 0–2)
SODIUM SERPL-SCNC: 144 MMOL/L — SIGNIFICANT CHANGE UP (ref 135–145)
SP GR SPEC: 1.01 — SIGNIFICANT CHANGE UP (ref 1.01–1.02)
UROBILINOGEN FLD QL: NEGATIVE — SIGNIFICANT CHANGE UP
WBC # BLD: 19.9 K/UL — HIGH (ref 3.8–10.5)
WBC # FLD AUTO: 19.9 K/UL — HIGH (ref 3.8–10.5)
WBC UR QL: SIGNIFICANT CHANGE UP /HPF (ref 0–5)

## 2018-12-04 PROCEDURE — 71045 X-RAY EXAM CHEST 1 VIEW: CPT | Mod: 26

## 2018-12-04 PROCEDURE — 99285 EMERGENCY DEPT VISIT HI MDM: CPT

## 2018-12-04 RX ORDER — PREGABALIN 225 MG/1
1000 CAPSULE ORAL DAILY
Qty: 0 | Refills: 0 | Status: DISCONTINUED | OUTPATIENT
Start: 2018-12-04 | End: 2018-12-10

## 2018-12-04 RX ORDER — AZITHROMYCIN 500 MG/1
500 TABLET, FILM COATED ORAL EVERY 24 HOURS
Qty: 0 | Refills: 0 | Status: DISCONTINUED | OUTPATIENT
Start: 2018-12-05 | End: 2018-12-09

## 2018-12-04 RX ORDER — HALOPERIDOL DECANOATE 100 MG/ML
5 INJECTION INTRAMUSCULAR ONCE
Qty: 0 | Refills: 0 | Status: COMPLETED | OUTPATIENT
Start: 2018-12-04 | End: 2018-12-04

## 2018-12-04 RX ORDER — ANASTROZOLE 1 MG/1
1 TABLET ORAL DAILY
Qty: 0 | Refills: 0 | Status: DISCONTINUED | OUTPATIENT
Start: 2018-12-04 | End: 2018-12-10

## 2018-12-04 RX ORDER — AZITHROMYCIN 500 MG/1
TABLET, FILM COATED ORAL
Qty: 0 | Refills: 0 | Status: DISCONTINUED | OUTPATIENT
Start: 2018-12-04 | End: 2018-12-09

## 2018-12-04 RX ORDER — SIMVASTATIN 20 MG/1
40 TABLET, FILM COATED ORAL AT BEDTIME
Qty: 0 | Refills: 0 | Status: DISCONTINUED | OUTPATIENT
Start: 2018-12-04 | End: 2018-12-10

## 2018-12-04 RX ORDER — CEFTRIAXONE 500 MG/1
1 INJECTION, POWDER, FOR SOLUTION INTRAMUSCULAR; INTRAVENOUS ONCE
Qty: 0 | Refills: 0 | Status: COMPLETED | OUTPATIENT
Start: 2018-12-04 | End: 2018-12-04

## 2018-12-04 RX ORDER — LITHIUM CARBONATE 300 MG/1
150 TABLET, EXTENDED RELEASE ORAL
Qty: 0 | Refills: 0 | Status: DISCONTINUED | OUTPATIENT
Start: 2018-12-05 | End: 2018-12-10

## 2018-12-04 RX ORDER — HEPARIN SODIUM 5000 [USP'U]/ML
5000 INJECTION INTRAVENOUS; SUBCUTANEOUS EVERY 12 HOURS
Qty: 0 | Refills: 0 | Status: DISCONTINUED | OUTPATIENT
Start: 2018-12-04 | End: 2018-12-10

## 2018-12-04 RX ORDER — IPRATROPIUM/ALBUTEROL SULFATE 18-103MCG
3 AEROSOL WITH ADAPTER (GRAM) INHALATION
Qty: 0 | Refills: 0 | Status: COMPLETED | OUTPATIENT
Start: 2018-12-04 | End: 2018-12-04

## 2018-12-04 RX ORDER — LORATADINE 10 MG/1
1 TABLET ORAL
Qty: 0 | Refills: 0 | COMMUNITY

## 2018-12-04 RX ORDER — PREGABALIN 225 MG/1
1 CAPSULE ORAL
Qty: 0 | Refills: 0 | COMMUNITY

## 2018-12-04 RX ORDER — PANTOPRAZOLE SODIUM 20 MG/1
40 TABLET, DELAYED RELEASE ORAL
Qty: 0 | Refills: 0 | Status: DISCONTINUED | OUTPATIENT
Start: 2018-12-04 | End: 2018-12-10

## 2018-12-04 RX ORDER — SODIUM CHLORIDE 9 MG/ML
1000 INJECTION, SOLUTION INTRAVENOUS
Qty: 0 | Refills: 0 | Status: DISCONTINUED | OUTPATIENT
Start: 2018-12-04 | End: 2018-12-06

## 2018-12-04 RX ORDER — CINACALCET 30 MG/1
30 TABLET, FILM COATED ORAL DAILY
Qty: 0 | Refills: 0 | Status: DISCONTINUED | OUTPATIENT
Start: 2018-12-04 | End: 2018-12-10

## 2018-12-04 RX ORDER — IPRATROPIUM/ALBUTEROL SULFATE 18-103MCG
3 AEROSOL WITH ADAPTER (GRAM) INHALATION EVERY 6 HOURS
Qty: 0 | Refills: 0 | Status: DISCONTINUED | OUTPATIENT
Start: 2018-12-04 | End: 2018-12-10

## 2018-12-04 RX ORDER — CEFTRIAXONE 500 MG/1
INJECTION, POWDER, FOR SOLUTION INTRAMUSCULAR; INTRAVENOUS
Qty: 0 | Refills: 0 | Status: DISCONTINUED | OUTPATIENT
Start: 2018-12-04 | End: 2018-12-09

## 2018-12-04 RX ORDER — DIPHENHYDRAMINE HCL 50 MG
25 CAPSULE ORAL ONCE
Qty: 0 | Refills: 0 | Status: COMPLETED | OUTPATIENT
Start: 2018-12-04 | End: 2018-12-04

## 2018-12-04 RX ORDER — AZITHROMYCIN 500 MG/1
500 TABLET, FILM COATED ORAL ONCE
Qty: 0 | Refills: 0 | Status: COMPLETED | OUTPATIENT
Start: 2018-12-04 | End: 2018-12-04

## 2018-12-04 RX ORDER — BETHANECHOL CHLORIDE 25 MG
25 TABLET ORAL DAILY
Qty: 0 | Refills: 0 | Status: DISCONTINUED | OUTPATIENT
Start: 2018-12-04 | End: 2018-12-10

## 2018-12-04 RX ORDER — CEFTRIAXONE 500 MG/1
1 INJECTION, POWDER, FOR SOLUTION INTRAMUSCULAR; INTRAVENOUS EVERY 24 HOURS
Qty: 0 | Refills: 0 | Status: DISCONTINUED | OUTPATIENT
Start: 2018-12-05 | End: 2018-12-09

## 2018-12-04 RX ADMIN — Medication 25 MILLIGRAM(S): at 16:00

## 2018-12-04 RX ADMIN — SIMVASTATIN 40 MILLIGRAM(S): 20 TABLET, FILM COATED ORAL at 22:51

## 2018-12-04 RX ADMIN — Medication 3 MILLILITER(S): at 14:46

## 2018-12-04 RX ADMIN — Medication 3 MILLILITER(S): at 15:03

## 2018-12-04 RX ADMIN — HALOPERIDOL DECANOATE 5 MILLIGRAM(S): 100 INJECTION INTRAMUSCULAR at 13:25

## 2018-12-04 RX ADMIN — Medication 80 MILLIGRAM(S): at 14:38

## 2018-12-04 RX ADMIN — CEFTRIAXONE 100 GRAM(S): 500 INJECTION, POWDER, FOR SOLUTION INTRAMUSCULAR; INTRAVENOUS at 19:31

## 2018-12-04 RX ADMIN — AZITHROMYCIN 250 MILLIGRAM(S): 500 TABLET, FILM COATED ORAL at 19:32

## 2018-12-04 RX ADMIN — Medication 3 MILLILITER(S): at 15:22

## 2018-12-04 RX ADMIN — SODIUM CHLORIDE 75 MILLILITER(S): 9 INJECTION, SOLUTION INTRAVENOUS at 22:55

## 2018-12-04 RX ADMIN — Medication 1 MILLIGRAM(S): at 14:38

## 2018-12-04 NOTE — ED PROVIDER NOTE - PMH
Bipolar 1 disorder    COPD (chronic obstructive pulmonary disease)    HLD (hyperlipidemia)    Parkinson's disease

## 2018-12-04 NOTE — H&P ADULT - HISTORY OF PRESENT ILLNESS
78 Year old female from [facility], walks with walker, with PMHx of COPD, Bipolar disorder, Parkinsonism, CKD Brought to ED by EMS cough. Patient is very lethargic, not answering any question, History obtained from Ed physician and EMS charts, NH staff called EMS since patient was having cough and Cold like symptoms and her O2 sat was in 85- 90's. Patient was very agitated, refused to Go to hospital. EMS finally brought her to ED. In Ed patient was still agitated, received Haldol, ativan and benadryl, since then pt has been sleeping. Patient responds to verbal commands but refuses to answer any question 78 Year old female from [facility], walks with walker, with PMHx of COPD, Bipolar disorder, Parkinsonism, CKD Brought to ED by EMS cough. Patient is very lethargic, not answering any question, History obtained from Ed physician and EMS charts, NH staff called EMS since patient was having cough and Cold like symptoms and her O2 sat was in 85- 90's. Patient was very agitated, refused to Go to hospital. EMS finally brought her to ED. In Ed patient was still agitated, received Haldol, ativan and benadryl, since then pt has been sleeping. Patient responds to verbal commands but refuses to answer any question.    On admission patient was afebrile, HD stable, RR 17, O2 sat 97 at room air, cbc leucocytosis 19k, BUN 21, Cr 1.4, , CXR showed   new airspace opacity identified within the right upper lobe suggesting right-sided pneumonia. Questionable opacity is identified within the left midlung field overlying the anterior left fourth rib.

## 2018-12-04 NOTE — ED ADULT NURSE NOTE - NSIMPLEMENTINTERV_GEN_ALL_ED
Implemented All Fall with Harm Risk Interventions:  [place] to call system. Call bell, personal items and telephone within reach. Instruct patient to call for assistance. Room bathroom lighting operational. Non-slip footwear when patient is off stretcher. Physically safe environment: no spills, clutter or unnecessary equipment. Stretcher in lowest position, wheels locked, appropriate side rails in place. Provide visual cue, wrist band, yellow gown, etc. Monitor gait and stability. Monitor for mental status changes and reorient to person, place, and time. Review medications for side effects contributing to fall risk. Reinforce activity limits and safety measures with patient and family. Provide visual clues: red socks.

## 2018-12-04 NOTE — H&P ADULT - NEUROLOGICAL COMMENTS
Patient was lethargic, But accusable, responds to verbal commands but does not answer all questions,  unable to perform full neuro exam,

## 2018-12-04 NOTE — H&P ADULT - PROBLEM SELECTOR PLAN 2
Patient has H/o CKD stage 4 with Baseline Cr 1.4 to 1.5   came with BUN 21 and Cr 1.4   lithium level 0.9 wnl   likely baseline renal functions   UA neg for infections

## 2018-12-04 NOTE — H&P ADULT - NSHPLABSRESULTS_GEN_ALL_CORE
11.0   19.9  )-----------( 352      ( 04 Dec 2018 13:31 )             36.4         12-04    144  |  108  |  21<H>  ----------------------------<  105<H>  3.9   |  25  |  1.46<H>    Ca    9.4      04 Dec 2018 13:31  Mg     2.3     12-04    TPro  8.2  /  Alb  3.1<L>  /  TBili  0.6  /  DBili  x   /  AST  12  /  ALT  13  /  AlkPhos  112  12-04    EXAM:  XR CHEST AP OR PA 1V                            PROCEDURE DATE:  12/04/2018          INTERPRETATION:  INDICATION: Cough    PRIORS: July 11, 2018    VIEWS: Portable AP radiography of the chest performed. Evaluation limited   secondary to patient rotation and portable technique.    FINDINGS: Heart size appears within normal limits. No hilar or superior   mediastinal abnormalities are identified. There is new airspace opacity   identified within the right upper lobe suggesting right-sided pneumonia.   Questionable opacity is identified within the left midlung field   overlying the anterior left fourth rib. No pleural effusion or   pneumothorax is demonstrated. No mediastinal shift is noted.    IMPRESSION: Findings suggest right-sided pneumonia. On patient clinical   improvement follow-up PA and lateral radiography of the chest is   recommended for reevaluation of the questionable opacity overlying the   left midlung field.

## 2018-12-04 NOTE — ED PROVIDER NOTE - OBJECTIVE STATEMENT
78 y.o. AL female BIBA with reportedly c/o coughing 78 y.o. AL female BIBA with reportedly c/o coughing.  Pt refuses to provide any informations or examined

## 2018-12-04 NOTE — H&P ADULT - NSHPPHYSICALEXAM_GEN_ALL_CORE
ICU Vital Signs Last 24 Hrs  T(C): 36.3 (04 Dec 2018 19:45), Max: 37.3 (04 Dec 2018 16:47)  T(F): 97.4 (04 Dec 2018 19:45), Max: 99.2 (04 Dec 2018 16:47)  HR: 84 (04 Dec 2018 19:45) (84 - 100)  BP: 100/58 (04 Dec 2018 19:45) (100/58 - 136/84)  BP(mean): --  ABP: --  ABP(mean): --  RR: 17 (04 Dec 2018 19:45) (17 - 17)  SpO2: 98% (04 Dec 2018 19:45) (95% - 100%)

## 2018-12-04 NOTE — H&P ADULT - PROBLEM SELECTOR PLAN 1
Patient was sent from NH for cough, cold like symptoms and agitation  On admission patient was afebrile, HD stable, RR 17, O2 sat 97 at room air, cbc leucocytosis 19k, BUN 21, Cr 1.4, , CXR showed   new airspace opacity identified within the right upper lobe suggesting right-sided pneumonia. Questionable opacity is identified within the left midlung field overlying the anterior left fourth rib.   Likely CAP   s/p Levofloxacin , Ativan, Haldol and Benadryl in ED   currently patient was lethargic, but accusable Patient was sent from NH for cough, cold like symptoms and agitation  On admission patient was afebrile, HD stable, RR 17, O2 sat 97 at room air, cbc leucocytosis 19k, BUN 21, Cr 1.4, , CXR showed   new airspace opacity identified within the right upper lobe suggesting right-sided pneumonia. Questionable opacity is identified within the left midlung field overlying the anterior left fourth rib.   Likely CAP   s/p Levofloxacin , Ativan, Haldol and Benadryl in ED   currently patient was lethargic, but accusable  will start on Rocephin and Azithromycin   RVP neg   f/u Blood and urine cx , Urine legionella, strep penum antigen

## 2018-12-04 NOTE — H&P ADULT - PROBLEM SELECTOR PLAN 6
IMPROVE VTE Individual Risk Assessment          RISK                                                          Points  [  ] Previous VTE                                                3  [  ] Thrombophilia                                             2  [  ] Lower limb paralysis                                   2        (unable to hold up >15 seconds)    [  ] Current Cancer                                             2         (within 6 months)  [ x ] Immobilization > 24 hrs                              1  [  ] ICU/CCU stay > 24 hours                             1  [ x ] Age > 60                                                         1    IMPROVE VTE Score: VTE score 2   Heparin for DVT prophylaxis

## 2018-12-05 DIAGNOSIS — D72.829 ELEVATED WHITE BLOOD CELL COUNT, UNSPECIFIED: ICD-10-CM

## 2018-12-05 RX ADMIN — CEFTRIAXONE 100 GRAM(S): 500 INJECTION, POWDER, FOR SOLUTION INTRAMUSCULAR; INTRAVENOUS at 18:00

## 2018-12-05 RX ADMIN — LITHIUM CARBONATE 150 MILLIGRAM(S): 300 TABLET, EXTENDED RELEASE ORAL at 17:06

## 2018-12-05 RX ADMIN — SIMVASTATIN 40 MILLIGRAM(S): 20 TABLET, FILM COATED ORAL at 21:42

## 2018-12-05 RX ADMIN — ANASTROZOLE 1 MILLIGRAM(S): 1 TABLET ORAL at 13:57

## 2018-12-05 RX ADMIN — PANTOPRAZOLE SODIUM 40 MILLIGRAM(S): 20 TABLET, DELAYED RELEASE ORAL at 05:41

## 2018-12-05 RX ADMIN — LITHIUM CARBONATE 150 MILLIGRAM(S): 300 TABLET, EXTENDED RELEASE ORAL at 05:41

## 2018-12-05 RX ADMIN — PREGABALIN 1000 MICROGRAM(S): 225 CAPSULE ORAL at 13:57

## 2018-12-05 RX ADMIN — Medication 100 MILLIGRAM(S): at 05:43

## 2018-12-05 RX ADMIN — Medication 25 MILLIGRAM(S): at 13:56

## 2018-12-05 RX ADMIN — CINACALCET 30 MILLIGRAM(S): 30 TABLET, FILM COATED ORAL at 13:55

## 2018-12-05 RX ADMIN — AZITHROMYCIN 250 MILLIGRAM(S): 500 TABLET, FILM COATED ORAL at 18:00

## 2018-12-05 NOTE — PROGRESS NOTE ADULT - ASSESSMENT
78 Year old female from [facility], walks with walker, with PMHx of COPD, Bipolar disorder, Parkinsonism, CKD Brought to ED by EMS cough. Patient was very lethargic, O2 sat was in 85- 90's at NH. In Ed patient was agitated, received Haldol, ativan and benadryl. On admission,  patient was afebrile, HD stable, RR 17, O2 sat 97 at room air, cbc leucocytosis 19k, BUN 21, Cr 1.4, , CXR showed   new airspace opacity identified within the right upper lobe suggesting right-sided pneumonia. Questionable opacity is identified within the left midlung field overlying the anterior left fourth rib.  admitted for right sided PNA

## 2018-12-05 NOTE — ADVANCED PRACTICE NURSE CONSULT - ASSESSMENT
This is a 78yr old female patient admitted for COPD, presenting with a Stage 1 Pressure Injury to the Bilateral Heels, as evident by non-blanchable erythema

## 2018-12-05 NOTE — PROGRESS NOTE ADULT - PROBLEM SELECTOR PLAN 2
cont home dose of Lithium 150 mg BID   Lithium wnl cont home dose of Lithium 150 mg BID   Lithium level wnl

## 2018-12-05 NOTE — PROGRESS NOTE ADULT - SUBJECTIVE AND OBJECTIVE BOX
Patient is a 78y old  Female who presents with a chief complaint of Cough (04 Dec 2018 18:25)      INTERVAL HPI/OVERNIGHT EVENTS: no acute changes overnight, improved mood, cooperative with exam.     REVIEW OF SYSTEMS:  CONSTITUTIONAL: No fever, chills  RESPIRATORY: +ve cough   CARDIOVASCULAR: No chest pain, palpitations  GASTROINTESTINAL: No abdominal pain. No nausea, vomiting, or diarrhea      T(C): 36.3 (18 @ 05:14), Max: 37.3 (18 @ 16:47)  HR: 85 (18 @ 05:14) (78 - 100)  BP: 116/63 (18 @ 05:14) (100/58 - 116/63)  RR: 18 (18 @ 05:14) (17 - 30)  SpO2: 98% (18 @ 05:14) (97% - 100%)  Wt(kg): --Vital Signs Last 24 Hrs  T(C): 36.3 (05 Dec 2018 05:14), Max: 37.3 (04 Dec 2018 16:47)  T(F): 97.4 (05 Dec 2018 05:14), Max: 99.2 (04 Dec 2018 16:47)  HR: 85 (05 Dec 2018 05:14) (78 - 100)  BP: 116/63 (05 Dec 2018 05:14) (100/58 - 116/63)  BP(mean): --  RR: 18 (05 Dec 2018 05:14) (17 - 30)  SpO2: 98% (05 Dec 2018 05:14) (97% - 100%)    MEDICATIONS  (STANDING):  ALBUTerol/ipratropium for Nebulization 3 milliLiter(s) Nebulizer every 6 hours  anastrozole 1 milliGRAM(s) Oral daily  azithromycin  IVPB 500 milliGRAM(s) IV Intermittent every 24 hours  azithromycin  IVPB      bethanechol 25 milliGRAM(s) Oral daily  cefTRIAXone   IVPB      cefTRIAXone   IVPB 1 Gram(s) IV Intermittent every 24 hours  cinacalcet 30 milliGRAM(s) Oral daily  cyanocobalamin 1000 MICROGram(s) Oral daily  dextrose 5% + sodium chloride 0.9%. 1000 milliLiter(s) (75 mL/Hr) IV Continuous <Continuous>  heparin  Injectable 5000 Unit(s) SubCutaneous every 12 hours  lithium 150 milliGRAM(s) Oral two times a day  pantoprazole    Tablet 40 milliGRAM(s) Oral before breakfast  simvastatin 40 milliGRAM(s) Oral at bedtime    MEDICATIONS  (PRN):  guaiFENesin    Syrup 100 milliGRAM(s) Oral every 6 hours PRN Cough      PHYSICAL EXAM:  GENERAL: NAD  EYES: clear conjunctiva  ENMT: Moist mucous membranes  NECK: Supple, No JVD  CHEST/LUNG: +ve rhonchi   HEART: S1, S2, Regular rate and rhythm  ABDOMEN: Soft, Nontender, Nondistended; Bowel sounds present  NEURO: Alert & Oriented x2, confused   EXTREMITIES: No LE edema    Consultant(s) Notes Reviewed:  [x ] YES  [ ] NO  Care Discussed with Consultants/Other Providers [ x] YES  [ ] NO    LABS: refused labs this AM.                         11.0   19.9  )-----------( 352      ( 04 Dec 2018 13:31 )             36.4     12-04    144  |  108  |  21<H>  ----------------------------<  105<H>  3.9   |  25  |  1.46<H>    Ca    9.4      04 Dec 2018 13:31  Mg     2.3     12-04    TPro  8.2  /  Alb  3.1<L>  /  TBili  0.6  /  DBili  x   /  AST  12  /  ALT  13  /  AlkPhos  112  12-04      Urinalysis Basic - ( 04 Dec 2018 18:06 )    Color: Yellow / Appearance: Clear / S.010 / pH: x  Gluc: x / Ketone: Negative  / Bili: Negative / Urobili: Negative   Blood: x / Protein: 15 / Nitrite: Negative   Leuk Esterase: Negative / RBC: 0-2 /HPF / WBC 0-2 /HPF   Sq Epi: x / Non Sq Epi: Occasional /HPF / Bacteria: Moderate /HPF      CAPILLARY BLOOD GLUCOSE        RADIOLOGY & ADDITIONAL TESTS:    < from: Xray Chest 1 View AP/PA (18 @ 15:23) >  EXAM:  XR CHEST AP OR PA 1V                            PROCEDURE DATE:  2018          INTERPRETATION:  INDICATION: Cough    PRIORS: 2018    VIEWS: Portable AP radiography of the chest performed. Evaluation limited   secondary to patient rotation and portable technique.    FINDINGS: Heart size appears within normal limits. No hilar or superior   mediastinal abnormalities are identified. There is new airspace opacity   identified within the right upper lobe suggesting right-sided pneumonia.   Questionable opacity is identified within the left midlung field   overlying the anterior left fourth rib. No pleural effusion or   pneumothorax is demonstrated. No mediastinal shift is noted.    IMPRESSION: Findings suggest right-sided pneumonia. On patient clinical   improvement follow-up PA and lateral radiography of the chest is   recommended for reevaluation of the questionable opacity overlying the   left midlung field.        < end of copied text >      Imaging Personally Reviewed:  [ ] YES  [ ] NO

## 2018-12-05 NOTE — PROGRESS NOTE ADULT - PROBLEM SELECTOR PLAN 1
admitted with  cough, cold like symptoms and agitation   CXR- new airspace opacity identified within the right upper lobe suggesting right-sided pneumonia. Questionable opacity is identified within the left midlung field overlying the anterior left fourth rib.   Likely CAP   s/p Levofloxacin in ED  cont Rocephin and Azithromycin   RVP neg   f/u Blood and urine cx , Urine legionella, strep penum antigen.

## 2018-12-05 NOTE — PROGRESS NOTE ADULT - SUBJECTIVE AND OBJECTIVE BOX
NINA CHRISTINE  MRN-[id number]    Patient is a 78y old  Female who presents with a chief complaint of Cough (04 Dec 2018 18:25)      patient seen and examined    s doing better     MEDICATIONS  (STANDING):  ALBUTerol/ipratropium for Nebulization 3 milliLiter(s) Nebulizer every 6 hours  anastrozole 1 milliGRAM(s) Oral daily  azithromycin  IVPB 500 milliGRAM(s) IV Intermittent every 24 hours  azithromycin  IVPB      bethanechol 25 milliGRAM(s) Oral daily  cefTRIAXone   IVPB      cefTRIAXone   IVPB 1 Gram(s) IV Intermittent every 24 hours  cinacalcet 30 milliGRAM(s) Oral daily  cyanocobalamin 1000 MICROGram(s) Oral daily  dextrose 5% + sodium chloride 0.9%. 1000 milliLiter(s) (75 mL/Hr) IV Continuous <Continuous>  heparin  Injectable 5000 Unit(s) SubCutaneous every 12 hours  lithium 150 milliGRAM(s) Oral two times a day  pantoprazole    Tablet 40 milliGRAM(s) Oral before breakfast  simvastatin 40 milliGRAM(s) Oral at bedtime      MEDICATIONS  (PRN):  guaiFENesin    Syrup 100 milliGRAM(s) Oral every 6 hours PRN Cough      Allergies    codeine (Unknown)  melatonin (Drowsiness)  Thorazine (Unknown)    Intolerances        PAST MEDICAL & SURGICAL HISTORY:  HLD (hyperlipidemia)  Parkinson's disease  Bipolar 1 disorder  COPD (chronic obstructive pulmonary disease)  No significant past surgical history      FAMILY HISTORY:  No pertinent family history in first degree relatives      SOCIAL HISTORY  Smoking History:     REVIEW OF SYSTEMS:    CONSTITUTIONAL:  Fevers [  ]  Yes  [ x ]  No                                   chills [  ]  Yes  [x  ]  No                               sweats  [  ]  Yes  [x  ]  No                                fatigue [ x ]  Yes  [  ]  No    HEENT:  Eyes:  Diplopia or blurred vision [  ]  Yes  [x  ]  No    ENT:                        earache  [  ]  Yes  [  x]  No                             sore throat  [  ]  Yes  [  x]  No                            runny nose.  [  ]  Yes  [  x]  No    CARDIOVASCULAR:       chest pain  [  ]  Yes  [  x]  No                        squeezing, tightness,  [  ]  Yes  [x  ]  No                                      palpitations.  [  ]  Yes  [x  ]  No    RESPIRATORY:             Cough [  x]  Yes  [  ]  No                                  Wheezing [ x ]  Yes  [  x]  No . mild                               Hemoptysis [  ]  Yes  [x  ]  No                                      Sputum [  ]  Yes  [  ]  No                   Shortness of Breathe [  ]  Yes  [ x ]  No    GASTROINTESTINAL:      Abdominal pain  [  ]  Yes  [x  ]  No                                                    Nausea  [  ]  Yes  [ xx ]  No                                                   Vomiting [  ]  Yes  [  x]  No                                              Constipation [  ]  Yes  [x  ]  No                                                    Diarrhea [  ]  Yes  [x  ]  No    GENITOURINARY:           Incontinence [  x]  Yes  [  ]  No                                                Dysuria [  ]  Yes  [  x]  No                                      Blood in Urine  [  ]  Yes  [ x ]  No                          Frequency or urgency.  [  ]  Yes  [ xx ]  No    NEUROLOGIC:                     Paresthesias  [  ]  Yes  [  x]  No                                             fasciculations  [  ]  Yes  [x  ]  No                                seizures or weakness.  [  ]  Yes  [ xx ]  No                                                   Headache [  ]  Yes  [ x ]  No                                  Loss of Conciousness [  ]  Yes  [  x]  No                                                     Insomnia [  ]  Yes  [x  ]  No    PSYCHIATRIC:    Disorder of thought or mood.  [x  ]  Yes  [  ]  No                                                           Anxiety [  ]  Yes  [  ]  No                                                      Depression [x  ]  Yes  [  ]  No                                                         Dementia [  ]  Yes  [  ]  No    Vital Signs Last 24 Hrs  T(C): 36.3 (05 Dec 2018 05:14), Max: 37.3 (04 Dec 2018 16:47)  T(F): 97.4 (05 Dec 2018 05:14), Max: 99.2 (04 Dec 2018 16:47)  HR: 85 (05 Dec 2018 05:14) (78 - 100)  BP: 116/63 (05 Dec 2018 05:14) (100/58 - 116/63)  BP(mean): --  RR: 18 (05 Dec 2018 05:14) (17 - 30)  SpO2: 98% (05 Dec 2018 05:14) (97% - 100%)  I&O's Detail      PHYSICAL EXAMINATION:    GENERAL: The patient is ___in no apparent distress.     HEENT: Head is normocephalic and atraumatic. Extraocular muscles are intact. Mucous membranes are moist.     NECK: Supple. jvd [  ]  Yes  [x  ]  No    LUNGS: Clear to auscultation  [  ]  Yes  [x  ]  No                               wheezing, [ x ]  Yes  [  ]  No, mild                                      rales  [  ]  Yes  [ x ]  No                                    rhonchi  x[  ]  Yes  [  ]  No                 Respirations labored  [  ]  Yes  x[  ]  No    HEART: Regular rate and rhythm  x[  ]  Yes  [  ]  No                                        Murmur [  ]  Yes  xx[  ]  No                                              rub  [  ]  Yes  [x  ]  No                                          gallop  [  ]  Yes  [x  ]  No    ABDOMEN:                                 Soft, x[  ]  Yes  [  ]  No                                             nontender [ x ]  Yes  [  ]  No                                             distended.[  ]  Yes  [x  ]  No                            hepatosplenomegaly ,[  ]  Yes  [x  ]  No                                                    BS   [  x]  Yes  [  ]  No    EXTREMITIES:                cyanosis, [  ]  Yes  [  x]  No                                       clubbing,   [  ]  Yes  [ x ]  No                                               rash, [  ]  Yes  [ x ]  No                                           edema.  [  ]  Yes  [ x ]  No    NEUROLOGIC: Alert and Oriented  [ x ] Person [  ] Time  x[ ] Place                                    Cranial nerves intact    [  ]  Yes  [  ]  No                                               sensory Intact  [  ]  Yes  [  ]  No                                                  motor WNL   [  ]  Yes  [  ]  No                                                Corona Paresis  [  ]  Yes  [  ]  No  DTR  babinski+ or -      LABS:                        11.0   19.9  )-----------( 352      ( 04 Dec 2018 13:31 )             36.4         144  |  108  |  21<H>  ----------------------------<  105<H>  3.9   |  25  |  1.46<H>    Ca    9.4      04 Dec 2018 13:31  Mg     2.3         TPro  8.2  /  Alb  3.1<L>  /  TBili  0.6  /  DBili  x   /  AST  12  /  ALT  13  /  AlkPhos  112  12-      Urinalysis Basic - ( 04 Dec 2018 18:06 )    Color: Yellow / Appearance: Clear / S.010 / pH: x  Gluc: x / Ketone: Negative  / Bili: Negative / Urobili: Negative   Blood: x / Protein: 15 / Nitrite: Negative   Leuk Esterase: Negative / RBC: 0-2 /HPF / WBC 0-2 /HPF   Sq Epi: x / Non Sq Epi: Occasional /HPF / Bacteria: Moderate /HPF              Serum Pro-Brain Natriuretic Peptide: 866 pg/mL (18 @ 13:31)    Lactate, Blood: 1.5 mmol/L (18 @ 13:10)  Lithium Level, Serum (18 @ 19:17)    Lithium Level, Serum: 0.9 mmol/L          MICROBIOLOGY:    RADIOLOGY & ADDITIONAL STUDIES:    CXR:< from: Xray Chest 1 View AP/PA (18 @ 15:23) >    IMPRESSION: Findings suggest right-sided pneumonia. On patient clinical   improvement follow-up PA and lateral radiography of the chest is   recommended for reevaluation of the questionable opacity overlying the   left midlung field.      < end of copied text >      Ct scan chest:    ekg;    echo:

## 2018-12-06 ENCOUNTER — TRANSCRIPTION ENCOUNTER (OUTPATIENT)
Age: 78
End: 2018-12-06

## 2018-12-06 DIAGNOSIS — N39.0 URINARY TRACT INFECTION, SITE NOT SPECIFIED: ICD-10-CM

## 2018-12-06 LAB
-  AMPICILLIN: SIGNIFICANT CHANGE UP
-  CIPROFLOXACIN: SIGNIFICANT CHANGE UP
-  LEVOFLOXACIN: SIGNIFICANT CHANGE UP
-  NITROFURANTOIN: SIGNIFICANT CHANGE UP
-  TETRACYCLINE: SIGNIFICANT CHANGE UP
-  VANCOMYCIN: SIGNIFICANT CHANGE UP
24R-OH-CALCIDIOL SERPL-MCNC: 22.9 NG/ML — LOW (ref 30–80)
ANION GAP SERPL CALC-SCNC: 7 MMOL/L — SIGNIFICANT CHANGE UP (ref 5–17)
BUN SERPL-MCNC: 39 MG/DL — HIGH (ref 7–18)
CALCIUM SERPL-MCNC: 9 MG/DL — SIGNIFICANT CHANGE UP (ref 8.4–10.5)
CHLORIDE SERPL-SCNC: 111 MMOL/L — HIGH (ref 96–108)
CHOLEST SERPL-MCNC: 114 MG/DL — SIGNIFICANT CHANGE UP (ref 10–199)
CO2 SERPL-SCNC: 26 MMOL/L — SIGNIFICANT CHANGE UP (ref 22–31)
CREAT SERPL-MCNC: 1.86 MG/DL — HIGH (ref 0.5–1.3)
CULTURE RESULTS: SIGNIFICANT CHANGE UP
GLUCOSE SERPL-MCNC: 58 MG/DL — LOW (ref 70–99)
HBA1C BLD-MCNC: 5.4 % — SIGNIFICANT CHANGE UP (ref 4–5.6)
HCT VFR BLD CALC: 33.8 % — LOW (ref 34.5–45)
HDLC SERPL-MCNC: 44 MG/DL — LOW
HGB BLD-MCNC: 10.3 G/DL — LOW (ref 11.5–15.5)
LIPID PNL WITH DIRECT LDL SERPL: 25 MG/DL — SIGNIFICANT CHANGE UP
MCHC RBC-ENTMCNC: 27.8 PG — SIGNIFICANT CHANGE UP (ref 27–34)
MCHC RBC-ENTMCNC: 30.4 GM/DL — LOW (ref 32–36)
MCV RBC AUTO: 91.6 FL — SIGNIFICANT CHANGE UP (ref 80–100)
METHOD TYPE: SIGNIFICANT CHANGE UP
ORGANISM # SPEC MICROSCOPIC CNT: SIGNIFICANT CHANGE UP
ORGANISM # SPEC MICROSCOPIC CNT: SIGNIFICANT CHANGE UP
PLATELET # BLD AUTO: 401 K/UL — HIGH (ref 150–400)
POTASSIUM SERPL-MCNC: 3.9 MMOL/L — SIGNIFICANT CHANGE UP (ref 3.5–5.3)
POTASSIUM SERPL-SCNC: 3.9 MMOL/L — SIGNIFICANT CHANGE UP (ref 3.5–5.3)
RBC # BLD: 3.69 M/UL — LOW (ref 3.8–5.2)
RBC # FLD: 14.6 % — HIGH (ref 10.3–14.5)
SODIUM SERPL-SCNC: 144 MMOL/L — SIGNIFICANT CHANGE UP (ref 135–145)
SPECIMEN SOURCE: SIGNIFICANT CHANGE UP
TOTAL CHOLESTEROL/HDL RATIO MEASUREMENT: 2.6 RATIO — LOW (ref 3.3–7.1)
TRIGL SERPL-MCNC: 223 MG/DL — HIGH (ref 10–149)
TSH SERPL-MCNC: 1.48 UU/ML — SIGNIFICANT CHANGE UP (ref 0.34–4.82)
WBC # BLD: 18.5 K/UL — HIGH (ref 3.8–10.5)
WBC # FLD AUTO: 18.5 K/UL — HIGH (ref 3.8–10.5)

## 2018-12-06 PROCEDURE — 71046 X-RAY EXAM CHEST 2 VIEWS: CPT | Mod: 26

## 2018-12-06 RX ORDER — SODIUM CHLORIDE 9 MG/ML
1000 INJECTION, SOLUTION INTRAVENOUS
Qty: 0 | Refills: 0 | Status: DISCONTINUED | OUTPATIENT
Start: 2018-12-06 | End: 2018-12-09

## 2018-12-06 RX ADMIN — Medication 100 MILLIGRAM(S): at 05:24

## 2018-12-06 RX ADMIN — LITHIUM CARBONATE 150 MILLIGRAM(S): 300 TABLET, EXTENDED RELEASE ORAL at 18:33

## 2018-12-06 RX ADMIN — HEPARIN SODIUM 5000 UNIT(S): 5000 INJECTION INTRAVENOUS; SUBCUTANEOUS at 18:33

## 2018-12-06 RX ADMIN — SODIUM CHLORIDE 75 MILLILITER(S): 9 INJECTION, SOLUTION INTRAVENOUS at 13:17

## 2018-12-06 RX ADMIN — CEFTRIAXONE 100 GRAM(S): 500 INJECTION, POWDER, FOR SOLUTION INTRAMUSCULAR; INTRAVENOUS at 18:33

## 2018-12-06 RX ADMIN — LITHIUM CARBONATE 150 MILLIGRAM(S): 300 TABLET, EXTENDED RELEASE ORAL at 05:21

## 2018-12-06 RX ADMIN — ANASTROZOLE 1 MILLIGRAM(S): 1 TABLET ORAL at 11:40

## 2018-12-06 RX ADMIN — CINACALCET 30 MILLIGRAM(S): 30 TABLET, FILM COATED ORAL at 11:41

## 2018-12-06 RX ADMIN — Medication 25 MILLIGRAM(S): at 11:40

## 2018-12-06 RX ADMIN — PREGABALIN 1000 MICROGRAM(S): 225 CAPSULE ORAL at 11:41

## 2018-12-06 RX ADMIN — HEPARIN SODIUM 5000 UNIT(S): 5000 INJECTION INTRAVENOUS; SUBCUTANEOUS at 05:21

## 2018-12-06 RX ADMIN — Medication 100 MILLIGRAM(S): at 21:35

## 2018-12-06 RX ADMIN — PANTOPRAZOLE SODIUM 40 MILLIGRAM(S): 20 TABLET, DELAYED RELEASE ORAL at 05:21

## 2018-12-06 RX ADMIN — SIMVASTATIN 40 MILLIGRAM(S): 20 TABLET, FILM COATED ORAL at 21:35

## 2018-12-06 RX ADMIN — AZITHROMYCIN 250 MILLIGRAM(S): 500 TABLET, FILM COATED ORAL at 18:32

## 2018-12-06 NOTE — DISCHARGE NOTE ADULT - ADDITIONAL INSTRUCTIONS
Please follow up with your PCP Please follow up with your primary medical doctor.   Home physical therapy - prescription given Continue with antibiotics:  Augmentin 500 mg BID for 5 days.   Please follow up with your primary medical doctor.   Home physical therapy - prescription given Continue with antibiotics:  Augmentin 500 mg BID for 4 more days.   Please follow up in 3-4 weeks to have a repeat CT scan of your lungs and possible CT guided biopsy if needed.   Please follow up for a PET Scan as an outpatient.    Please follow up with your primary medical doctor.   Home physical therapy - prescription given Continue with antibiotics:  Augmentin 500 mg BID for 4 more days.   Please follow up in 3-4 weeks to have a repeat CT scan of your lungs and CT guided biopsy if needed.   Please follow up for a PET Scan as an outpatient.    Please follow up with your primary medical doctor.   Home physical therapy - prescription given

## 2018-12-06 NOTE — PROGRESS NOTE ADULT - PROBLEM SELECTOR PLAN 5
DVT prophylaxis with   On protonix PO for GI prophylaxis Continue home dose of Lithium 150 mg BID   Lithium level 0.9

## 2018-12-06 NOTE — PROGRESS NOTE ADULT - PROBLEM SELECTOR PLAN 2
Hx of CKD stage 4 with baseline Cr 1.4-1.5  Cr now 1.86, trending up from 12/4   Possibly due to dehydration, IV fluids restarted  BMP in AM

## 2018-12-06 NOTE — PROGRESS NOTE ADULT - ASSESSMENT
78 Year old female from [facility], walks with walker, with PMHx of COPD, Bipolar disorder, Parkinsonism, CKD Brought to ED by EMS cough. Patient was very lethargic, O2 sat was in 85- 90's at NH. In Ed patient was agitated, received Haldol, ativan and benadryl. On admission,  patient was afebrile, HD stable, RR 17, O2 sat 97 at room air, cbc leucocytosis 19k, BUN 21, Cr 1.4, , CXR showed   new airspace opacity identified within the right upper lobe suggesting right-sided pneumonia. Questionable opacity is identified within the left midlung field overlying the anterior left fourth rib.  admitted for right sided PNA

## 2018-12-06 NOTE — DISCHARGE NOTE ADULT - HOSPITAL COURSE
78 Year old female from [facility], walks with walker, with PMHx of COPD, Bipolar disorder, Parkinsonism, CKD Brought to ED by EMS for cough. Patient was very lethargic, O2 sat was in 85- 90's at NH. In the ED, patient was agitated, received Haldol, ativan and benadryl. On admission,  patient was afebrile, HD stable, RR 17, O2 sat 97 at room air, cbc leucocytosis 19k, BUN 21, Cr 1.4, , CXR showed new airspace opacity identified within the right upper lobe suggesting right-sided pneumonia. Questionable opacity is identified within the left midlung field overlying the anterior left fourth rib.     She was admitted for right sided PNA and has been receiving antibiotics since 12/4. She was also found to have a UTI which was treated by antibiotics as well. She has been afebrile since admission and her WBCs are trending down. Her mental status has improved and she reports feeling better.  NOT COMPLETE 78 Year old female from [facility], walks with walker, with PMHx of COPD, Bipolar disorder, Parkinsonism, CKD Brought to ED by EMS for cough. Patient was very lethargic, O2 sat was in 85- 90's at NH. In the ED, patient was agitated, received Haldol, ativan and benadryl. On admission,  patient was afebrile, HD stable, RR 17, O2 sat 97 at room air, cbc leucocytosis 19k, BUN 21, Cr 1.4, , CXR showed new airspace opacity identified within the right upper lobe suggesting right-sided pneumonia. Questionable opacity is identified within the left midlung field overlying the anterior left fourth rib.     She was admitted for right sided PNA and has been receiving antibiotics since 12/4. She was also found to have a UTI which was treated by antibiotics as well. She has been afebrile since admission and her WBCs are trending down. Her mental status has improved and she reports feeling better. Stable for discharge with home physical therapy. 78 Year old female from [facility], walks with walker, with PMHx of COPD, Bipolar disorder, Parkinsonism, CKD Brought to ED by EMS for cough. Patient was very lethargic, O2 sat was in 85- 90's at NH. In the ED, patient was agitated, received Haldol, ativan and benadryl. On admission,  patient was afebrile, HD stable, RR 17, O2 sat 97 at room air, cbc leucocytosis 19k, BUN 21, Cr 1.4, , CXR showed new airspace opacity identified within the right upper lobe suggesting right-sided pneumonia. Questionable opacity is identified within the left midlung field overlying the anterior left fourth rib.     She was admitted for right sided PNA and has been receiving antibiotics since 12/4. She was also found to have a UTI which was treated by antibiotics as well. She has been afebrile since admission and her WBCs are trending down. Her mental status has improved and she reports feeling better.     A CT scan found a RUL lesion that is suspicious for carcinoma and LLL and RUL peripheral lesions that are suspicious for metastasis     Stable for discharge with home physical therapy. 78 Year old female from [facility], walks with walker, with PMHx of COPD, Bipolar disorder, Parkinsonism, CKD Brought to ED by EMS for cough. Patient was very lethargic, O2 sat was in 85- 90's at NH. In the ED, patient was agitated, received Haldol, ativan and benadryl. On admission,  patient was afebrile, HD stable, RR 17, O2 sat 97 at room air, cbc leucocytosis 19k, BUN 21, Cr 1.4, , CXR showed new airspace opacity identified within the right upper lobe suggesting right-sided pneumonia. Questionable opacity is identified within the left midlung field overlying the anterior left fourth rib.     She was admitted for right sided PNA and has been receiving antibiotics since 12/4. She was also found to have a UTI which was treated by antibiotics as well. She has been afebrile since admission and her WBCs are trending down. Her mental status has improved and she reports feeling better.     A CT scan found a RUL lesion that is suspicious for carcinoma and LLL and RUL peripheral lesions that are suspicious for metastasis. As discussed with IR and pulmonary, plan is for repeat CT scan in 3-4 weeks and CT guided biopsy as an outpatient and PET scan as outpatient as well.     Stable for discharge with home physical therapy. 78 Year old female from [facility], walks with walker, with PMHx of COPD, Bipolar disorder, Parkinsonism, CKD Brought to ED by EMS for cough. Patient was very lethargic, O2 sat was in 85- 90's at NH. In the ED, patient was agitated, received Haldol, ativan and benadryl. On admission,  patient was afebrile, HD stable, RR 17, O2 sat 97 at room air, cbc leucocytosis 19k, BUN 21, Cr 1.4, , CXR showed new airspace opacity identified within the right upper lobe suggesting right-sided pneumonia. Questionable opacity is identified within the left midlung field overlying the anterior left fourth rib.     She was admitted for right sided PNA and has been receiving antibiotics since 12/4. She was also found to have a UTI which was treated by antibiotics as well. She has been afebrile since admission and her WBCs are trending down. Her mental status has improved and she reports feeling better.     A CT scan found a RUL lesion that is suspicious for carcinoma and LLL and RUL peripheral lesions that are suspicious for metastasis. As discussed with IR and pulmonary, plan is for repeat CT scan in 3-4 weeks and CT guided biopsy as an outpatient and PET scan as outpatient as well.     Stable for discharge with home physical therapy at [facility]

## 2018-12-06 NOTE — PROGRESS NOTE ADULT - SUBJECTIVE AND OBJECTIVE BOX
Received Cc'd chart - appears provider wants RN to recheck with parent in 24-48 hours to see if any improvement in URI with cough symptoms. (see OV 5/1/18)   Provider attempted to call patient yesterday, 5/2/18.   RN to try again today.         NP Note discussed with Primary Attending    Patient is a 78y old  Female who presents with a chief complaint of Cough (06 Dec 2018 09:39).     78 Year old female from [facility] living, walks with walker, with PMHx of COPD, Bipolar disorder, Parkinsonism, CKD Brought to ED by EMS cough. Patient was very lethargic, O2 sat was in 85- 90's at NH. In Ed patient was agitated, received Haldol, ativan and benadryl. On admission,  patient was afebrile, HD stable, RR 17, O2 sat 97 at room air, cbc leucocytosis 19k, BUN 21, Cr 1.4, , CXR showed   new airspace opacity identified within the right upper lobe suggesting right-sided pneumonia. Questionable opacity is identified within the left midlung field overlying the anterior left fourth rib.  Admitted for right sided PNA , now currently on day 3 of antibiotics.     INTERVAL HPI/OVERNIGHT EVENTS: complained of coughing overnight     MEDICATIONS  (STANDING):  ALBUTerol/ipratropium for Nebulization 3 milliLiter(s) Nebulizer every 6 hours  anastrozole 1 milliGRAM(s) Oral daily  azithromycin  IVPB 500 milliGRAM(s) IV Intermittent every 24 hours  azithromycin  IVPB      bethanechol 25 milliGRAM(s) Oral daily  cefTRIAXone   IVPB      cefTRIAXone   IVPB 1 Gram(s) IV Intermittent every 24 hours  cinacalcet 30 milliGRAM(s) Oral daily  cyanocobalamin 1000 MICROGram(s) Oral daily  dextrose 5% + sodium chloride 0.9%. 1000 milliLiter(s) (75 mL/Hr) IV Continuous <Continuous>  heparin  Injectable 5000 Unit(s) SubCutaneous every 12 hours  lithium 150 milliGRAM(s) Oral two times a day  pantoprazole    Tablet 40 milliGRAM(s) Oral before breakfast  simvastatin 40 milliGRAM(s) Oral at bedtime    MEDICATIONS  (PRN):  guaiFENesin    Syrup 100 milliGRAM(s) Oral every 6 hours PRN Cough      ________________________________________________  REVIEW OF SYSTEMS:    CONSTITUTIONAL: No fever,   EYES: no acute visual disturbances  NECK: No pain or stiffness  RESPIRATORY: infrequent coughing; No shortness of breath  CARDIOVASCULAR: No chest pain, no palpitations  GASTROINTESTINAL: No pain. No nausea or vomiting; No diarrhea   NEUROLOGICAL: No headache or numbness, no tremors  MUSCULOSKELETAL: No joint pain, no muscle pain  GENITOURINARY: no dysuria, no frequency, no hesitancy  PSYCHIATRY: no depression , no anxiety  ALL OTHER  ROS negative      Vital Signs Last 24 Hrs  T(C): 36.4 (06 Dec 2018 05:07), Max: 37.1 (05 Dec 2018 14:09)  T(F): 97.6 (06 Dec 2018 05:07), Max: 98.8 (05 Dec 2018 14:09)  HR: 96 (06 Dec 2018 05:07) (56 - 110)  BP: 131/72 (06 Dec 2018 05:07) (123/80 - 146/59)  BP(mean): --  RR: 18 (06 Dec 2018 05:07) (18 - 30)  SpO2: 97% (06 Dec 2018 05:07) (97% - 100%)    ________________________________________________  PHYSICAL EXAM:  GENERAL: NAD  HEENT: Normocephalic;  conjunctivae and sclerae clear; moist mucous membranes;   NECK : supple  CHEST/LUNG: bilateral rhonchi   HEART: S1 S2  regular; no murmurs, gallops or rubs  ABDOMEN: Soft, Nontender, Nondistended; Bowel sounds present  EXTREMITIES: no cyanosis; no edema; no calf tenderness  SKIN: warm and dry; no rash  NERVOUS SYSTEM:  Awake and alert; Oriented  to place, person and time ; no new deficits    _________________________________________________  LABS:                        11.0   19.9  )-----------( 352      ( 04 Dec 2018 13:31 )             36.4     12    144  |  108  |  21<H>  ----------------------------<  105<H>  3.9   |  25  |  1.46<H>    Ca    9.4      04 Dec 2018 13:31  Mg     2.3     12-    TPro  8.2  /  Alb  3.1<L>  /  TBili  0.6  /  DBili  x   /  AST  12  /  ALT  13  /  AlkPhos  112  12-      Urinalysis Basic - ( 04 Dec 2018 18:06 )    Color: Yellow / Appearance: Clear / S.010 / pH: x  Gluc: x / Ketone: Negative  / Bili: Negative / Urobili: Negative   Blood: x / Protein: 15 / Nitrite: Negative   Leuk Esterase: Negative / RBC: 0-2 /HPF / WBC 0-2 /HPF   Sq Epi: x / Non Sq Epi: Occasional /HPF / Bacteria: Moderate /HPF      CAPILLARY BLOOD GLUCOSE      RADIOLOGY & ADDITIONAL TESTS:    Imaging  Reviewed:  YES  Consultant(s) Notes Reviewed:   YES    Plan of care was discussed with patient and /or primary care giver; all questions and concerns were addressed NP Note discussed with Primary Attending    Patient is a 78y old  Female who presents with a chief complaint of Cough (06 Dec 2018 09:39).     78 Year old female from [facility] living, walks with walker, with PMHx of COPD, Bipolar disorder, Parkinsonism, CKD Brought to ED by EMS cough. Patient was very lethargic, O2 sat was in 85- 90's at NH. In Ed patient was agitated, received Haldol, ativan and benadryl. On admission,  patient was afebrile, HD stable, RR 17, O2 sat 97 at room air, cbc leucocytosis 19k, BUN 21, Cr 1.4, , CXR showed   new airspace opacity identified within the right upper lobe suggesting right-sided pneumonia. Questionable opacity is identified within the left midlung field overlying the anterior left fourth rib.  Admitted for right sided PNA , now currently on day 3 of antibiotics. Agitation has resolved and patient is now cooperative with care.     INTERVAL HPI/OVERNIGHT EVENTS: complained of coughing overnight, otherwise afebrile with no respiratory distress     MEDICATIONS  (STANDING):  ALBUTerol/ipratropium for Nebulization 3 milliLiter(s) Nebulizer every 6 hours  anastrozole 1 milliGRAM(s) Oral daily  azithromycin  IVPB 500 milliGRAM(s) IV Intermittent every 24 hours  azithromycin  IVPB      bethanechol 25 milliGRAM(s) Oral daily  cefTRIAXone   IVPB      cefTRIAXone   IVPB 1 Gram(s) IV Intermittent every 24 hours  cinacalcet 30 milliGRAM(s) Oral daily  cyanocobalamin 1000 MICROGram(s) Oral daily  dextrose 5% + sodium chloride 0.9%. 1000 milliLiter(s) (75 mL/Hr) IV Continuous <Continuous>  heparin  Injectable 5000 Unit(s) SubCutaneous every 12 hours  lithium 150 milliGRAM(s) Oral two times a day  pantoprazole    Tablet 40 milliGRAM(s) Oral before breakfast  simvastatin 40 milliGRAM(s) Oral at bedtime    MEDICATIONS  (PRN):  guaiFENesin    Syrup 100 milliGRAM(s) Oral every 6 hours PRN Cough      ________________________________________________  REVIEW OF SYSTEMS:    CONSTITUTIONAL: No fever,   EYES: no acute visual disturbances  NECK: No pain or stiffness  RESPIRATORY: infrequent coughing; No shortness of breath  CARDIOVASCULAR: No chest pain, no palpitations  GASTROINTESTINAL: No pain. No nausea or vomiting; No diarrhea   NEUROLOGICAL: No headache or numbness, no tremors  MUSCULOSKELETAL: No joint pain, no muscle pain  GENITOURINARY: no dysuria, no frequency, no hesitancy  PSYCHIATRY: no depression , no anxiety  ALL OTHER  ROS negative      Vital Signs Last 24 Hrs  T(C): 36.4 (06 Dec 2018 05:07), Max: 37.1 (05 Dec 2018 14:09)  T(F): 97.6 (06 Dec 2018 05:07), Max: 98.8 (05 Dec 2018 14:09)  HR: 96 (06 Dec 2018 05:07) (56 - 110)  BP: 131/72 (06 Dec 2018 05:07) (123/80 - 146/59)  BP(mean): --  RR: 18 (06 Dec 2018 05:07) (18 - 30)  SpO2: 97% (06 Dec 2018 05:07) (97% - 100%)    ________________________________________________  PHYSICAL EXAM:  GENERAL: NAD  HEENT: Normocephalic;  conjunctivae and sclerae clear; moist mucous membranes;   NECK : supple  CHEST/LUNG: bilateral rhonchi   HEART: S1 S2  regular; no murmurs, gallops or rubs  ABDOMEN: Soft, Nontender, Nondistended; Bowel sounds present  EXTREMITIES: no cyanosis; no edema; no calf tenderness  SKIN: warm and dry; no rash  NERVOUS SYSTEM:  Awake and alert; Oriented  to place, person and time ; no new deficits    _________________________________________________  LABS:                        11.0   19.9  )-----------( 352      ( 04 Dec 2018 13:31 )             36.4     12-    144  |  108  |  21<H>  ----------------------------<  105<H>  3.9   |  25  |  1.46<H>    Ca    9.4      04 Dec 2018 13:31  Mg     2.3     12-04    TPro  8.2  /  Alb  3.1<L>  /  TBili  0.6  /  DBili  x   /  AST  12  /  ALT  13  /  AlkPhos  112  12-04      Urinalysis Basic - ( 04 Dec 2018 18:06 )    Color: Yellow / Appearance: Clear / S.010 / pH: x  Gluc: x / Ketone: Negative  / Bili: Negative / Urobili: Negative   Blood: x / Protein: 15 / Nitrite: Negative   Leuk Esterase: Negative / RBC: 0-2 /HPF / WBC 0-2 /HPF   Sq Epi: x / Non Sq Epi: Occasional /HPF / Bacteria: Moderate /HPF      CAPILLARY BLOOD GLUCOSE      RADIOLOGY & ADDITIONAL TESTS:    Imaging  Reviewed:  YES  Consultant(s) Notes Reviewed:   YES    Plan of care was discussed with patient, all questions and concerns were addressed

## 2018-12-06 NOTE — CONSULT NOTE ADULT - ASSESSMENT
Rul bronchopneumonia  UTI with E. Fecalis  Parkinsonism  Obesity  RT breast Lumpectomy  PBc  Bipolar     PLAN: IV antibiotics  Repeat CXR/CT Chest without contrast  Robitussin DNM 2 tsf qid  s/c Lovenox  Cbc and creatinine  Thanks for consult

## 2018-12-06 NOTE — PROGRESS NOTE ADULT - PROBLEM SELECTOR PLAN 4
Patient has H/o CKD stage 4 with Baseline Cr 1.4 to 1.5   admitted  with BUN 21 and Cr 1.4- likely baseline   lithium level 0.9 wnl  UA neg  Mon BMP

## 2018-12-06 NOTE — PROGRESS NOTE ADULT - PROBLEM SELECTOR PLAN 3
No wheezing on exam  Continue duonebs UA negative   UCx positive for enterococcus faecalis pending sensitivities  Continue with broad spectrum abx

## 2018-12-06 NOTE — DISCHARGE NOTE ADULT - PLAN OF CARE
To be free from infection You were admitted for coughing and found to have pneumonia. Please finish your course of oral antibiotics. Please contact your doctor if you have worsening shortness of breath, chest pain, fever, or chills. You were found to have a urinary tract infection. Please continue your course of oral antibiotics. Please contact your doctor if you have worsening burning, frequency, or difficulty urinating. To prevent renal disease from worsening While you were in the hospital, your kidney function worsened slightly probably due to dehydration. Please make sure you are drinking enough fluid throughout the day. Avoid medications such as advil, ibuprofen, and aleve as they may further harm your kidneys. Please follow up with your primary care doctor to recheck your kidney function. To continue to have stable moods Please continue to take your lithium as ordered by your doctor. Please continue to routinely follow up with your doctor to have your lithium levels checked. Your last lithium level on 12/4 was 0.9 While you were in the hospital, your kidney function worsened slightly probably due to dehydration but is now resolved. Please make sure you are drinking enough fluid throughout the day. Avoid medications such as advil, ibuprofen, and aleve as they may further harm your kidneys. Please follow up with your primary care doctor to recheck your kidney function. To follow up as an outpatient The CT scan of your lungs revealed a right upper lobe lesion that is suspicious for cancer. Please follow up in 3-4 weeks to have a repeat CT scan and possible CT guided biopsy if needed. Please also have a PET Scan as an outpatient as well. You were admitted for coughing and found to have pneumonia. Please finish your course of oral antibiotics and oral prednisone. Please contact your doctor if you have worsening shortness of breath, chest pain, fever, or chills. The CT scan of your lungs revealed a right upper lobe lesion that is suspicious for cancer. Please follow up in 3-4 weeks after completion of antibiotics to have a repeat CT scan and possible CT guided biopsy if needed. Please also have a PET Scan as an outpatient as well.

## 2018-12-06 NOTE — DISCHARGE NOTE ADULT - SECONDARY DIAGNOSIS.
Urinary tract infection due to Enterococcus Renal insufficiency Bipolar 1 disorder Abnormal CT scan, lung

## 2018-12-06 NOTE — DISCHARGE NOTE ADULT - PATIENT PORTAL LINK FT
You can access the [facility], offered by [facility], by registering with the following website: [email or web address]

## 2018-12-06 NOTE — PROGRESS NOTE ADULT - PROBLEM SELECTOR PLAN 1
WBC only slightly decreased from 12/4   Continue azithromycin and ceftriaxone, currently day 3   Blood cultures and RVP from 12/4 negative   Robitussin PRN   Pending repeat chest xray CXR from 12/4 - new airspace opacity identified within the right upper lobe suggesting right-sided pneumonia. Likely CAP   WBC only slightly decreased from 12/4   Continue azithromycin and ceftriaxone, currently day 3   Blood cultures and RVP from 12/4 negative   Robitussin PRN   Pending repeat chest xray  f/u  legionella and strep penum antigen.   Pulm following CXR from 12/4 - new airspace opacity identified within the right upper lobe suggesting right-sided pneumonia. Likely CAP   WBC only slightly decreased from 12/4   Continue azithromycin and ceftriaxone, currently day 3   Blood cultures and RVP from 12/4 negative   Robitussin PRN   Pending repeat chest xray  f/u  legionella and strep penum antigen.   Pulm Dr. Jurado following

## 2018-12-06 NOTE — DISCHARGE NOTE ADULT - CARE PLAN
Principal Discharge DX:	PNA (pneumonia)  Goal:	To be free from infection  Assessment and plan of treatment:	You were admitted for coughing and found to have pneumonia. Please finish your course of oral antibiotics. Please contact your doctor if you have worsening shortness of breath, chest pain, fever, or chills.  Secondary Diagnosis:	Urinary tract infection due to Enterococcus  Goal:	To be free from infection  Assessment and plan of treatment:	You were found to have a urinary tract infection. Please continue your course of oral antibiotics. Please contact your doctor if you have worsening burning, frequency, or difficulty urinating.  Secondary Diagnosis:	Renal insufficiency  Goal:	To prevent renal disease from worsening  Assessment and plan of treatment:	While you were in the hospital, your kidney function worsened slightly probably due to dehydration. Please make sure you are drinking enough fluid throughout the day. Avoid medications such as advil, ibuprofen, and aleve as they may further harm your kidneys. Please follow up with your primary care doctor to recheck your kidney function.  Secondary Diagnosis:	Bipolar 1 disorder  Goal:	To continue to have stable moods  Assessment and plan of treatment:	Please continue to take your lithium as ordered by your doctor. Please continue to routinely follow up with your doctor to have your lithium levels checked. Your last lithium level on 12/4 was 0.9 Principal Discharge DX:	PNA (pneumonia)  Goal:	To be free from infection  Assessment and plan of treatment:	You were admitted for coughing and found to have pneumonia. Please finish your course of oral antibiotics. Please contact your doctor if you have worsening shortness of breath, chest pain, fever, or chills.  Secondary Diagnosis:	Urinary tract infection due to Enterococcus  Goal:	To be free from infection  Assessment and plan of treatment:	You were found to have a urinary tract infection. Please continue your course of oral antibiotics. Please contact your doctor if you have worsening burning, frequency, or difficulty urinating.  Secondary Diagnosis:	Renal insufficiency  Goal:	To prevent renal disease from worsening  Assessment and plan of treatment:	While you were in the hospital, your kidney function worsened slightly probably due to dehydration but is now resolved. Please make sure you are drinking enough fluid throughout the day. Avoid medications such as advil, ibuprofen, and aleve as they may further harm your kidneys. Please follow up with your primary care doctor to recheck your kidney function.  Secondary Diagnosis:	Bipolar 1 disorder  Goal:	To continue to have stable moods  Assessment and plan of treatment:	Please continue to take your lithium as ordered by your doctor. Please continue to routinely follow up with your doctor to have your lithium levels checked. Your last lithium level on 12/4 was 0.9  Secondary Diagnosis:	Abnormal CT scan, lung  Goal:	To follow up as an outpatient  Assessment and plan of treatment:	The CT scan of your lungs revealed a right upper lobe lesion that is suspicious for cancer. Please follow up in 3-4 weeks to have a repeat CT scan and possible CT guided biopsy if needed. Please also have a PET Scan as an outpatient as well. Principal Discharge DX:	PNA (pneumonia)  Goal:	To be free from infection  Assessment and plan of treatment:	You were admitted for coughing and found to have pneumonia. Please finish your course of oral antibiotics and oral prednisone. Please contact your doctor if you have worsening shortness of breath, chest pain, fever, or chills.  Secondary Diagnosis:	Urinary tract infection due to Enterococcus  Goal:	To be free from infection  Assessment and plan of treatment:	You were found to have a urinary tract infection. Please continue your course of oral antibiotics. Please contact your doctor if you have worsening burning, frequency, or difficulty urinating.  Secondary Diagnosis:	Renal insufficiency  Goal:	To prevent renal disease from worsening  Assessment and plan of treatment:	While you were in the hospital, your kidney function worsened slightly probably due to dehydration but is now resolved. Please make sure you are drinking enough fluid throughout the day. Avoid medications such as advil, ibuprofen, and aleve as they may further harm your kidneys. Please follow up with your primary care doctor to recheck your kidney function.  Secondary Diagnosis:	Bipolar 1 disorder  Goal:	To continue to have stable moods  Assessment and plan of treatment:	Please continue to take your lithium as ordered by your doctor. Please continue to routinely follow up with your doctor to have your lithium levels checked. Your last lithium level on 12/4 was 0.9  Secondary Diagnosis:	Abnormal CT scan, lung  Goal:	To follow up as an outpatient  Assessment and plan of treatment:	The CT scan of your lungs revealed a right upper lobe lesion that is suspicious for cancer. Please follow up in 3-4 weeks to have a repeat CT scan and possible CT guided biopsy if needed. Please also have a PET Scan as an outpatient as well. Principal Discharge DX:	PNA (pneumonia)  Goal:	To be free from infection  Assessment and plan of treatment:	You were admitted for coughing and found to have pneumonia. Please finish your course of oral antibiotics and oral prednisone. Please contact your doctor if you have worsening shortness of breath, chest pain, fever, or chills.  Secondary Diagnosis:	Urinary tract infection due to Enterococcus  Goal:	To be free from infection  Assessment and plan of treatment:	You were found to have a urinary tract infection. Please continue your course of oral antibiotics. Please contact your doctor if you have worsening burning, frequency, or difficulty urinating.  Secondary Diagnosis:	Renal insufficiency  Goal:	To prevent renal disease from worsening  Assessment and plan of treatment:	While you were in the hospital, your kidney function worsened slightly probably due to dehydration but is now resolved. Please make sure you are drinking enough fluid throughout the day. Avoid medications such as advil, ibuprofen, and aleve as they may further harm your kidneys. Please follow up with your primary care doctor to recheck your kidney function.  Secondary Diagnosis:	Bipolar 1 disorder  Goal:	To continue to have stable moods  Assessment and plan of treatment:	Please continue to take your lithium as ordered by your doctor. Please continue to routinely follow up with your doctor to have your lithium levels checked. Your last lithium level on 12/4 was 0.9  Secondary Diagnosis:	Abnormal CT scan, lung  Goal:	To follow up as an outpatient  Assessment and plan of treatment:	The CT scan of your lungs revealed a right upper lobe lesion that is suspicious for cancer. Please follow up in 3-4 weeks after completion of antibiotics to have a repeat CT scan and possible CT guided biopsy if needed. Please also have a PET Scan as an outpatient as well.

## 2018-12-06 NOTE — DISCHARGE NOTE ADULT - MEDICATION SUMMARY - MEDICATIONS TO TAKE
I will START or STAY ON the medications listed below when I get home from the hospital:    predniSONE 20 mg oral tablet  -- 2 tab(s) by mouth once a day  -- Indication: For PNA (pneumonia)    loratadine 10 mg oral tablet  -- 1 tab(s) by mouth once a day  -- Indication: For COPD (chronic obstructive pulmonary disease)    simvastatin 40 mg oral tablet  -- 1 tab(s) by mouth once a day (at bedtime)  -- Indication: For HLD    anastrozole 1 mg oral tablet  -- 1 tab(s) by mouth once a day  -- Indication: For Breast Ca    lithium 150 mg oral capsule  -- 1 cap(s) by mouth 2 times a day  -- Indication: For Bipolar 1 disorder    Symbicort 160 mcg-4.5 mcg/inh inhalation aerosol  -- 2 puff(s) inhaled 2 times a day   -- Check with your doctor before becoming pregnant.  For inhalation only.  Rinse mouth thoroughly after use.    -- Indication: For COPD (chronic obstructive pulmonary disease)    ipratropium-albuterol 0.5 mg-2.5 mg/3 mLinhalation solution  -- 3 milliliter(s) inhaled 4 times a day  -- Indication: For COPD (chronic obstructive pulmonary disease)    cinacalcet 30 mg oral tablet  -- 1 tab(s) by mouth once a day  -- Indication: For Breast ca    bethanechol 25 mg oral tablet  -- 1 tab(s) by mouth 2 times a day  -- Indication: For incontinence     amoxicillin-clavulanate 500 mg-125 mg oral tablet  -- 1 tab(s) by mouth 2 times a day   -- Finish all this medication unless otherwise directed by prescriber.  Take with food or milk.    -- Indication: For PNA (pneumonia)    omeprazole 20 mg oral delayed release capsule  -- 1 cap(s) by mouth once a day  -- Indication: For GERD    cyanocobalamin 100 mcg oral tablet  -- 1 tab(s) by mouth once a day  -- Indication: For supplement

## 2018-12-06 NOTE — PROGRESS NOTE ADULT - SUBJECTIVE AND OBJECTIVE BOX
Patient is a 78y old  Female who presents with a chief complaint of Cough (04 Dec 2018 18:25)      INTERVAL HPI/OVERNIGHT EVENTS: no acute changes overnight, improved mood, cooperative with exam.     MEDICATIONS  (STANDING):  ALBUTerol/ipratropium for Nebulization 3 milliLiter(s) Nebulizer every 6 hours  anastrozole 1 milliGRAM(s) Oral daily  azithromycin  IVPB 500 milliGRAM(s) IV Intermittent every 24 hours  azithromycin  IVPB      bethanechol 25 milliGRAM(s) Oral daily  cefTRIAXone   IVPB      cefTRIAXone   IVPB 1 Gram(s) IV Intermittent every 24 hours  cinacalcet 30 milliGRAM(s) Oral daily  cyanocobalamin 1000 MICROGram(s) Oral daily  dextrose 5% + sodium chloride 0.9%. 1000 milliLiter(s) (75 mL/Hr) IV Continuous <Continuous>  heparin  Injectable 5000 Unit(s) SubCutaneous every 12 hours  lithium 150 milliGRAM(s) Oral two times a day  pantoprazole    Tablet 40 milliGRAM(s) Oral before breakfast  simvastatin 40 milliGRAM(s) Oral at bedtime    MEDICATIONS  (PRN):  guaiFENesin    Syrup 100 milliGRAM(s) Oral every 6 hours PRN Cough    REVIEW OF SYSTEMS:  CONSTITUTIONAL: No fever, chills  RESPIRATORY: +ve cough   CARDIOVASCULAR: No chest pain, palpitations  GASTROINTESTINAL: No abdominal pain. No nausea, vomiting, or diarrhea  Vital Signs Last 24 Hrs  T(C): 36.4 (06 Dec 2018 05:07), Max: 37.1 (05 Dec 2018 14:09)  T(F): 97.6 (06 Dec 2018 05:07), Max: 98.8 (05 Dec 2018 14:09)  HR: 96 (06 Dec 2018 05:07) (56 - 110)  BP: 131/72 (06 Dec 2018 05:07) (123/80 - 146/59)  BP(mean): --  RR: 18 (06 Dec 2018 05:07) (18 - 30)  SpO2: 97% (06 Dec 2018 05:07) (97% - 100%)    PHYSICAL EXAM:  GENERAL: NAD  EYES: clear conjunctiva  ENMT: Moist mucous membranes  NECK: Supple, No JVD  CHEST/LUNG: +ve rhonchi   HEART: S1, S2, Regular rate and rhythm  ABDOMEN: Soft, Nontender, Nondistended; Bowel sounds present  NEURO: Alert & Oriented x2, confused   EXTREMITIES: No LE edemaLABS: refused labs this AM.       LABS:                        11.0   19.9  )-----------( 352      ( 04 Dec 2018 13:31 )             36.4         144  |  108  |  21<H>  ----------------------------<  105<H>  3.9   |  25  |  1.46<H>    Ca    9.4      04 Dec 2018 13:31  Mg     2.3         TPro  8.2  /  Alb  3.1<L>  /  TBili  0.6  /  DBili  x   /  AST  12  /  ALT  13  /  AlkPhos  112        Urinalysis Basic - ( 04 Dec 2018 18:06 )    Color: Yellow / Appearance: Clear / S.010 / pH: x  Gluc: x / Ketone: Negative  / Bili: Negative / Urobili: Negative   Blood: x / Protein: 15 / Nitrite: Negative   Leuk Esterase: Negative / RBC: 0-2 /HPF / WBC 0-2 /HPF   Sq Epi: x / Non Sq Epi: Occasional /HPF / Bacteria: Moderate /HPF              Serum Pro-Brain Natriuretic Peptide: 866 pg/mL (18 @ 13:31)        .  Leuk Esterase: Negative / RBC: 0-2 /HPF / WBC 0-2 /HPF   Sq Epi: x / Non Sq Epi: Occasional /HPF / Bacteria: Moderate /HPF      CAPILLARY BLOOD GLUCOSE        RADIOLOGY & ADDITIONAL TESTS:    < from: Xray Chest 1 View AP/PA (18 @ 15:23) >  EXAM:  XR CHEST AP OR PA 1V                            PROCEDURE DATE:  2018          INTERPRETATION:  INDICATION: Cough    PRIORS: 2018    VIEWS: Portable AP radiography of the chest performed. Evaluation limited   secondary to patient rotation and portable technique.    FINDINGS: Heart size appears within normal limits. No hilar or superior   mediastinal abnormalities are identified. There is new airspace opacity   identified within the right upper lobe suggesting right-sided pneumonia.   Questionable opacity is identified within the left midlung field   overlying the anterior left fourth rib. No pleural effusion or   pneumothorax is demonstrated. No mediastinal shift is noted.    IMPRESSION: Findings suggest right-sided pneumonia. On patient clinical   improvement follow-up PA and lateral radiography of the chest is   recommended for reevaluation of the questionable opacity overlying the   left midlung field.        < end of copied text >      Imaging Personally Reviewed:  [ ] YES  [ ] NO

## 2018-12-06 NOTE — CONSULT NOTE ADULT - SUBJECTIVE AND OBJECTIVE BOX
PULMONARY CONSULT NOTE      NINA CHRISTINE  MRN-[id number]    Patient is a 78y old  Female who presents with a chief complaint of Cough (06 Dec 2018 09:53) for a few days , no chills or sob  Hx chart lab and xrays reviewed, Feels non productive cough and constipation      HISTORY OF PRESENT ILLNESS:78 Year old female from [facility] living, walks with walker, with PMHx of COPD, Bipolar disorder, Parkinsonism, CKD Brought to ED by EMS cough. Patient is very lethargic, not answering any question, History obtained from Ed physician and EMS charts, NH staff called EMS since patient was having cough and Cold like symptoms and her O2 sat was in 85- 90's. Patient was very agitated, refused to Go to hospital. EMS finally brought her to ED. In Ed patient was still agitated, received Haldol, ativan and benadryl, since then pt has been sleeping. Patient responds to verbal commands but refuses to answer any question.    Home Medications:  anastrozole 1 mg oral tablet: 1 tab(s) orally once a day (04 Dec 2018 20:58)  cyanocobalamin 100 mcg oral tablet: 1 tab(s) orally once a day (04 Dec 2018 20:58)  loratadine 10 mg oral tablet: 1 tab(s) orally once a day (04 Dec 2018 20:58)  simvastatin 40 mg oral tablet: 1 tab(s) orally once a day (at bedtime) (04 Dec 2018 20:58)      MEDICATIONS  (STANDING):  ALBUTerol/ipratropium for Nebulization 3 milliLiter(s) Nebulizer every 6 hours  anastrozole 1 milliGRAM(s) Oral daily  azithromycin  IVPB 500 milliGRAM(s) IV Intermittent every 24 hours  azithromycin  IVPB      bethanechol 25 milliGRAM(s) Oral daily  cefTRIAXone   IVPB      cefTRIAXone   IVPB 1 Gram(s) IV Intermittent every 24 hours  cinacalcet 30 milliGRAM(s) Oral daily  cyanocobalamin 1000 MICROGram(s) Oral daily  dextrose 5% + sodium chloride 0.9%. 1000 milliLiter(s) (75 mL/Hr) IV Continuous <Continuous>  heparin  Injectable 5000 Unit(s) SubCutaneous every 12 hours  lithium 150 milliGRAM(s) Oral two times a day  pantoprazole    Tablet 40 milliGRAM(s) Oral before breakfast  simvastatin 40 milliGRAM(s) Oral at bedtime      MEDICATIONS  (PRN):  guaiFENesin    Syrup 100 milliGRAM(s) Oral every 6 hours PRN Cough      Allergies    codeine (Unknown)  melatonin (Drowsiness)  Thorazine (Unknown)    Intolerances        PAST MEDICAL & SURGICAL HISTORY:  HLD (hyperlipidemia)  Parkinson's disease  Bipolar 1 disorder  COPD (chronic obstructive pulmonary disease)  Rt lumpectomy ? R/T      FAMILY HISTORY:  No pertinent family history in first degree relatives  HTN --   DM --  IHD--   Asthma --COPD --    SOCIAL HISTORY SMOKING  ex smoker > 20 yrs  ETOH --    DRUGS--    REVIEW OF SYSTEMS:  CONSTITUTIONAL: No fever, weight loss, or fatigue   EYES: No eye pain, visual disturbances, or discharge  ENT:  No difficulty hearing, tinnitus, vertigo; No sinus or throat pain  NECK: No pain or stiffness   RESPIRATORY:  cough ++  wheezing--   chills --  hemoptysis--    Shortness of Breath--  CARDIOVASCULAR: No chest pain, palpitations, passing out, dizziness, or leg swelling  GASTROINTESTINAL: No abdominal or epigastric pain. No nausea, vomiting, or hematemesis; No diarrhea or constipation. No melena or hematochezia.  GENITOURINARY: No dysuria, frequency, hematuria, or incontinence  NEUROLOGICAL: No headaches, memory loss, loss of strength, numbness, but tremors++  SKIN: No itching, burning, rashes, or lesions   LYMPH Nodes: No enlarged glands  ENDOCRINE: No heat or cold intolerance; No hair loss  HEME/LYMPH: No easy bruising, or bleeding gums  ALLERGY AND IMMUNOLOGIC: No hives or eczema      Vital Signs Last 24 Hrs  T(C): 36.4 (06 Dec 2018 05:07), Max: 37.1 (05 Dec 2018 14:09)  T(F): 97.6 (06 Dec 2018 05:07), Max: 98.8 (05 Dec 2018 14:09)  HR: 80 (06 Dec 2018 09:57) (56 - 110)  BP: 131/72 (06 Dec 2018 05:07) (123/80 - 146/59)  BP(mean): --  RR: 18 (06 Dec 2018 05:07) (18 - 30)  SpO2: 98% (06 Dec 2018 09:57) (97% - 100%)  I&O's Detail    05 Dec 2018 07:01  -  06 Dec 2018 07:00  --------------------------------------------------------  IN:    IV PiggyBack: 300 mL  Total IN: 300 mL    OUT:  Total OUT: 0 mL    Total NET: 300 mL          PHYSICAL EXAMINATION:    GENERAL: The patient is a well-developed, well-nourished in no apparent distress.   SKIN: No rashes ecchymoses or cyanosis  HEENT: Head is normocephalic and atraumatic. Extraocular muscles are intact. Mucous membranes are moist.   Neck supple LN not felt, JVP not increased  Thyroid not enlarged  Lymphatic: No lymphadenopathy, Rt breast lumpectomy  Cardiovascular:  S1 S2  heard ,RSR , JVP not increased , syst murmur at apex,  PVC, No  gallop or rub  Respiratory:  Symmetrical chest wall movements Breathing vesicular , Percussion note normal no dulness   with no  rales or  wheeze  ABDOMEN:  Soft, Non-tender, obese,   No  hepatosplenomegaly ,BS positive		  Extremities: Normal range of motion, No clubbing, cyanosis or edema , No calf tenderness, tremors both hands  Vascular: Peripheral pulses palpable 2+ bilaterally  CNS: Alert and oriented x3,  Mood and affect appropriate  Cranial nerves intact  sensory intact, tremors  motor Power 5/5, DTR 3+   Babinski neg    LABS:                        11.0   19.9  )-----------( 352      ( 04 Dec 2018 13:31 )             36.4     12-04    144  |  108  |  21<H>  ----------------------------<  105<H>  3.9   |  25  |  1.46<H>    Ca    9.4      04 Dec 2018 13:31  Mg     2.3     12-    TPro  8.2  /  Alb  3.1<L>  /  TBili  0.6  /  DBili  x   /  AST  12  /  ALT  13  /  AlkPhos  112  12-      Urinalysis Basic - ( 04 Dec 2018 18:06 )    Color: Yellow / Appearance: Clear / S.010 / pH: x  Gluc: x / Ketone: Negative  / Bili: Negative / Urobili: Negative   Blood: x / Protein: 15 / Nitrite: Negative   Leuk Esterase: Negative / RBC: 0-2 /HPF / WBC 0-2 /HPF   Sq Epi: x / Non Sq Epi: Occasional /HPF / Bacteria: Moderate /HPF              Serum Pro-Brain Natriuretic Peptide: 866 pg/mL (18 @ 13:31)    MICROBIOLOGY:    Culture - Urine (collected 18 @ 23:59)  Source: .Urine Clean Catch (Midstream)  Preliminary Report (18 @ 23:56):    50,000 - 99,000 CFU/mL Enterococcus faecalis    Culture - Blood (collected 18 @ 18:15)  Source: .Blood Blood-Peripheral  Preliminary Report (18 @ 19:01):    No growth to date.    Culture - Blood (collected 18 @ 18:15)  Source: .Blood Blood-Peripheral  Preliminary Report (18 @ 19:01):    No growth to date.        RADIOLOGY & ADDITIONAL STUDIES:    CXR: ill defined patchy infiltrate RUl        ekg; sinus Tach, LAd. PVC

## 2018-12-06 NOTE — DISCHARGE NOTE ADULT - CARE PROVIDERS DIRECT ADDRESSES
,DirectAddress_Unknown ,[email or web address]

## 2018-12-06 NOTE — DISCHARGE NOTE ADULT - CARE PROVIDER_API CALL
Adia Leyva (MD), Internal Medicine  [address] [place] [address]  Phone: [phone]  Fax: [phone] Héctor Lares), Internal Medicine  [address]  [place] [address]  Phone: [phone]  Fax: [phone]    Valorie Barros (MD), EndocrinologyMetabDiabetes; Internal Medicine  [address] [place] [address]  Phone: [phone]  Fax: [phone]    Nikole Farfan), Neurology  [address] [facility]  2nd [place] [address]  [place] [address]  Phone: [phone]  Fax: [phone]

## 2018-12-07 LAB
ANION GAP SERPL CALC-SCNC: 7 MMOL/L — SIGNIFICANT CHANGE UP (ref 5–17)
BUN SERPL-MCNC: 29 MG/DL — HIGH (ref 7–18)
CALCIUM SERPL-MCNC: 8.8 MG/DL — SIGNIFICANT CHANGE UP (ref 8.4–10.5)
CHLORIDE SERPL-SCNC: 115 MMOL/L — HIGH (ref 96–108)
CO2 SERPL-SCNC: 24 MMOL/L — SIGNIFICANT CHANGE UP (ref 22–31)
CREAT SERPL-MCNC: 1.41 MG/DL — HIGH (ref 0.5–1.3)
GLUCOSE SERPL-MCNC: 112 MG/DL — HIGH (ref 70–99)
HCT VFR BLD CALC: 29.5 % — LOW (ref 34.5–45)
HGB BLD-MCNC: 9.3 G/DL — LOW (ref 11.5–15.5)
LEGIONELLA AG UR QL: NEGATIVE — SIGNIFICANT CHANGE UP
MCHC RBC-ENTMCNC: 28.6 PG — SIGNIFICANT CHANGE UP (ref 27–34)
MCHC RBC-ENTMCNC: 31.6 GM/DL — LOW (ref 32–36)
MCV RBC AUTO: 90.4 FL — SIGNIFICANT CHANGE UP (ref 80–100)
PLATELET # BLD AUTO: 314 K/UL — SIGNIFICANT CHANGE UP (ref 150–400)
POTASSIUM SERPL-MCNC: 3.7 MMOL/L — SIGNIFICANT CHANGE UP (ref 3.5–5.3)
POTASSIUM SERPL-SCNC: 3.7 MMOL/L — SIGNIFICANT CHANGE UP (ref 3.5–5.3)
RBC # BLD: 3.26 M/UL — LOW (ref 3.8–5.2)
RBC # FLD: 14.5 % — SIGNIFICANT CHANGE UP (ref 10.3–14.5)
S PNEUM AG SER QL: SIGNIFICANT CHANGE UP
SODIUM SERPL-SCNC: 146 MMOL/L — HIGH (ref 135–145)
WBC # BLD: 13 K/UL — HIGH (ref 3.8–10.5)
WBC # FLD AUTO: 13 K/UL — HIGH (ref 3.8–10.5)

## 2018-12-07 RX ORDER — IPRATROPIUM/ALBUTEROL SULFATE 18-103MCG
3 AEROSOL WITH ADAPTER (GRAM) INHALATION ONCE
Qty: 0 | Refills: 0 | Status: COMPLETED | OUTPATIENT
Start: 2018-12-07 | End: 2018-12-07

## 2018-12-07 RX ADMIN — Medication 3 MILLILITER(S): at 08:51

## 2018-12-07 RX ADMIN — Medication 3 MILLILITER(S): at 20:50

## 2018-12-07 RX ADMIN — Medication 100 MILLIGRAM(S): at 17:23

## 2018-12-07 RX ADMIN — PANTOPRAZOLE SODIUM 40 MILLIGRAM(S): 20 TABLET, DELAYED RELEASE ORAL at 05:49

## 2018-12-07 RX ADMIN — LITHIUM CARBONATE 150 MILLIGRAM(S): 300 TABLET, EXTENDED RELEASE ORAL at 05:50

## 2018-12-07 RX ADMIN — AZITHROMYCIN 250 MILLIGRAM(S): 500 TABLET, FILM COATED ORAL at 18:07

## 2018-12-07 RX ADMIN — HEPARIN SODIUM 5000 UNIT(S): 5000 INJECTION INTRAVENOUS; SUBCUTANEOUS at 05:50

## 2018-12-07 RX ADMIN — ANASTROZOLE 1 MILLIGRAM(S): 1 TABLET ORAL at 13:19

## 2018-12-07 RX ADMIN — Medication 3 MILLILITER(S): at 14:54

## 2018-12-07 RX ADMIN — HEPARIN SODIUM 5000 UNIT(S): 5000 INJECTION INTRAVENOUS; SUBCUTANEOUS at 17:24

## 2018-12-07 RX ADMIN — LITHIUM CARBONATE 150 MILLIGRAM(S): 300 TABLET, EXTENDED RELEASE ORAL at 18:07

## 2018-12-07 RX ADMIN — PREGABALIN 1000 MICROGRAM(S): 225 CAPSULE ORAL at 13:19

## 2018-12-07 RX ADMIN — Medication 25 MILLIGRAM(S): at 13:19

## 2018-12-07 RX ADMIN — CEFTRIAXONE 100 GRAM(S): 500 INJECTION, POWDER, FOR SOLUTION INTRAMUSCULAR; INTRAVENOUS at 18:07

## 2018-12-07 RX ADMIN — SIMVASTATIN 40 MILLIGRAM(S): 20 TABLET, FILM COATED ORAL at 21:36

## 2018-12-07 RX ADMIN — Medication 100 MILLIGRAM(S): at 05:49

## 2018-12-07 RX ADMIN — CINACALCET 30 MILLIGRAM(S): 30 TABLET, FILM COATED ORAL at 13:18

## 2018-12-07 NOTE — PROGRESS NOTE ADULT - SUBJECTIVE AND OBJECTIVE BOX
PULMONARY  progress note    NINA CHRISTINE  [id number]    Patient is a 78y old  Female who presents with a chief complaint of Cough (07 Dec 2018 08:43)  pt feels better  still with cough      MEDICATIONS  (STANDING):  ALBUTerol/ipratropium for Nebulization 3 milliLiter(s) Nebulizer every 6 hours  anastrozole 1 milliGRAM(s) Oral daily  azithromycin  IVPB 500 milliGRAM(s) IV Intermittent every 24 hours  azithromycin  IVPB      bethanechol 25 milliGRAM(s) Oral daily  cefTRIAXone   IVPB      cefTRIAXone   IVPB 1 Gram(s) IV Intermittent every 24 hours  cinacalcet 30 milliGRAM(s) Oral daily  cyanocobalamin 1000 MICROGram(s) Oral daily  dextrose 5% + sodium chloride 0.9%. 1000 milliLiter(s) (75 mL/Hr) IV Continuous <Continuous>  heparin  Injectable 5000 Unit(s) SubCutaneous every 12 hours  lithium 150 milliGRAM(s) Oral two times a day  pantoprazole    Tablet 40 milliGRAM(s) Oral before breakfast  simvastatin 40 milliGRAM(s) Oral at bedtime      MEDICATIONS  (PRN):  guaiFENesin    Syrup 100 milliGRAM(s) Oral every 6 hours PRN Cough      Allergies    codeine (Unknown)  melatonin (Drowsiness)  Thorazine (Unknown)            PAST MEDICAL & SURGICAL HISTORY:  HLD (hyperlipidemia)  Parkinson's disease  Bipolar 1 disorder  COPD (chronic obstructive pulmonary disease)  No significant past surgical history           REVIEW OF SYSTEMS:  CONSTITUTIONAL: No fever, weight loss, or fatigue   EYES: No eye pain, visual disturbances, or discharge  ENT:  No difficulty hearing, tinnitus, vertigo; No sinus or throat pain  NECK: No pain or stiffness or nodes  RESPIRATORY:  cough++   wheezing=   chills--   hemoptysis--  Shortness of Breath--  CARDIOVASCULAR: No chest pain, palpitations, passing out, dizziness, or leg swelling  GASTROINTESTINAL: No abdominal or epigastric pain. No nausea, vomiting, or hematemesis; No diarrhea or constipation. No melena or hematochezia.  GENITOURINARY: No dysuria, frequency, hematuria, or incontinence  NEUROLOGICAL: No headaches, memory loss, loss of strength, numbness, or tremors  SKIN: No itching, burning, rashes, or lesions   HEME/LYMPH: No easy bruising, or bleeding gums  ALLERGY AND IMMUNOLOGIC: No hives or eczema    Vital Signs Last 24 Hrs  T(C): 36.1 (07 Dec 2018 05:24), Max: 36.6 (06 Dec 2018 14:31)  T(F): 97 (07 Dec 2018 05:24), Max: 97.8 (06 Dec 2018 14:31)  HR: 84 (07 Dec 2018 05:24) (80 - 92)  BP: 127/71 (07 Dec 2018 05:24) (113/56 - 132/78)  BP(mean): --  RR: 16 (07 Dec 2018 05:24) (16 - 28)  SpO2: 100% (07 Dec 2018 05:24) (98% - 100%)  I&O's Detail    06 Dec 2018 07:01  -  07 Dec 2018 07:00  --------------------------------------------------------  IN:    dextrose 5% + sodium chloride 0.9%.: 525 mL    IV PiggyBack: 300 mL  Total IN: 825 mL    OUT:  Total OUT: 0 mL    Total NET: 825 mL          PHYSICAL EXAMINATION:    GENERAL: The patient is a well-developed, well-nourished in no apparent distress.   SKIN no rash ecchymoses or bruises  HEENT: Head is normocephalic and atraumatic  YADI , Extraocular muscles are intact. Mucous membranes  moist.   Neck supple ,No LN felt JVP not increased  Thyroid not enlarged  Cardiovascular:  S1 S2 heard, RSR, No JVD , systolic  murmur at apex, No gallop or rub  Respiratory: Chest wall symmetrical with good air entry ,Percussion note normal,    Lungs vesicular breathing with occasional  rt basal  rales with end expiratory   wheeze	  ABDOMEN:  Soft, Non-tender, obese, no hepatomegaly or splenomegaly BS positive	  Extremities: Normal range of motion, No clubbing, cyanosis or edema  Vascular: Peripheral pulses palpable 2+ bilaterally  CNS:  Alert and oriented x3   Cranial nerves intact  sensory intact  motor power5/5  dtr 2+   Babinski neg    LABS:                        9.3    13.0  )-----------( 314      ( 07 Dec 2018 08:03 )             29.5     12-07    146<H>  |  115<H>  |  29<H>  ----------------------------<  112<H>  3.7   |  24  |  1.41<H>    Ca    8.8      07 Dec 2018 08:03      Serum Pro-Brain Natriuretic Peptide: 866 pg/mL (12-04-18 @ 13:31)        MICROBIOLOGY:    Culture - Urine (collected 12-04-18 @ 23:59)  Source: .Urine Clean Catch (Midstream)  Final Report (12-06-18 @ 23:43):    50,000 - 99,000 CFU/mL Enterococcus faecalis  Organism: Enterococcus faecalis (12-06-18 @ 23:43)  Organism: Enterococcus faecalis (12-06-18 @ 23:43)      -  Ampicillin: S <=2 Predicts results to ampicillin/sulbactam, amoxacillin-clavulanate and  piperacillin-tazobactam.      -  Ciprofloxacin: R >2      -  Levofloxacin: R >4      -  Nitrofurantoin: S <=32 Should not be used to treat pyelonephritis.      -  Tetra/Doxy: R >8      -  Vancomycin: S 2      Method Type: ARTEM    Culture - Blood (collected 12-04-18 @ 18:15)  Source: .Blood Blood-Peripheral  Preliminary Report (12-05-18 @ 19:01):    No growth to date.    Culture - Blood (collected 12-04-18 @ 18:15)  Source: .Blood Blood-Peripheral  Preliminary Report (12-05-18 @ 19:01):    No growth to date.          RADIOLOGY & ADDITIONAL STUDIES:    CXR:< from: Xray Chest 2 Views PA/Lat (12.06.18 @ 11:22) >   Heart size appears within normal limits. No hilar or superior   mediastinal abnormalities are identified.    There is persistence of right perihilar opacity suggesting right-sided   pneumonia. Opacity noted overlying the left upper lung field appears   decreased.There is no evidence for pleural effusion or pneumothorax. No   mediastinal shift is noted. No acute osseous fractures demonstrated.

## 2018-12-07 NOTE — PROGRESS NOTE ADULT - ASSESSMENT
Rul bronchopneumonia  UTI with E. Fecalis  Parkinsonism  Obesity  RT breast Lumpectomy  Bipolar     PLAN: IV antibiotics  po augmentin   add prednisone 40 mg qd x 4 days and d/c  CT Chest without contrast  Robitussin DNM 2 tsf qid  s/c Lovenox  will reevaluate in 2 days Rul bronchopneumonia  UTI with E. Fecalis  Parkinsonism  Obesity  RT breast Lumpectomy  Bipolar     PLAN: IV antibiotics and change to po augmentin  2-3 days  po zithromax  add prednisone 40 mg qd x 4 days and d/c  CT Chest without contrast  Robitussin DNM 2 tsf qid  s/c Lovenox  will reevaluate in 2 days

## 2018-12-07 NOTE — PROGRESS NOTE ADULT - SUBJECTIVE AND OBJECTIVE BOX
Patient is a 78y old  Female who presents with a chief complaint of Cough (06 Dec 2018 09:39).   .  INTERVAL HPI/OVERNIGHT EVENTS: complained of coughing overnight, otherwise afebrile with no respiratory distress   .MEDICATIONS  (STANDING):  ALBUTerol/ipratropium for Nebulization 3 milliLiter(s) Nebulizer every 6 hours  anastrozole 1 milliGRAM(s) Oral daily  azithromycin  IVPB 500 milliGRAM(s) IV Intermittent every 24 hours  azithromycin  IVPB      bethanechol 25 milliGRAM(s) Oral daily  cefTRIAXone   IVPB      cefTRIAXone   IVPB 1 Gram(s) IV Intermittent every 24 hours  cinacalcet 30 milliGRAM(s) Oral daily  cyanocobalamin 1000 MICROGram(s) Oral daily  dextrose 5% + sodium chloride 0.9%. 1000 milliLiter(s) (75 mL/Hr) IV Continuous <Continuous>  heparin  Injectable 5000 Unit(s) SubCutaneous every 12 hours  lithium 150 milliGRAM(s) Oral two times a day  pantoprazole    Tablet 40 milliGRAM(s) Oral before breakfast  simvastatin 40 milliGRAM(s) Oral at bedtime    MEDICATIONS  (PRN):  guaiFENesin    Syrup 100 milliGRAM(s) Oral every 6 hours PRN Cough    ________________________________________________  REVIEW OF SYSTEMS:    CONSTITUTIONAL: No fever,   EYES: no acute visual disturbances  NECK: No pain or stiffness  RESPIRATORY: infrequent coughing; No shortness of breath  CARDIOVASCULAR: No chest pain, no palpitations  GASTROINTESTINAL: No pain. No nausea or vomiting; No diarrhea   NEUROLOGICAL: No headache or numbness, no tremors  MUSCULOSKELETAL: No joint pain, no muscle pain  GENITOURINARY: no dysuria, no frequency, no hesitancy  PSYCHIATRY: no depression , no anxiety  ALL OTHER  ROS negative      .Vital Signs Last 24 Hrs  T(C): 36.1 (07 Dec 2018 05:24), Max: 36.6 (06 Dec 2018 14:31)  T(F): 97 (07 Dec 2018 05:24), Max: 97.8 (06 Dec 2018 14:31)  HR: 84 (07 Dec 2018 05:24) (80 - 92)  BP: 127/71 (07 Dec 2018 05:24) (113/56 - 132/78)  BP(mean): --  RR: 16 (07 Dec 2018 05:24) (16 - 28)  SpO2: 100% (07 Dec 2018 05:24) (98% - 100%)  ________________________________________________  PHYSICAL EXAM:  GENERAL: NAD  HEENT: Normocephalic;  conjunctivae and sclerae clear; moist mucous membranes;   NECK : supple  CHEST/LUNG: bilateral rhonchi   HEART: S1 S2  regular; no murmurs, gallops or rubs  ABDOMEN: Soft, Nontender, Nondistended; Bowel sounds present  EXTREMITIES: no cyanosis; no edema; no calf tenderness  SKIN: warm and dry; no rash  NERVOUS SYSTEM:  Awake and alert; Oriented  to place, person and time ; no new deficits    _________________________________________________    LABS:                        9.3    x     )-----------( 314      ( 07 Dec 2018 08:03 )             29.5     12-    146<H>  |  115<H>  |  29<H>  ----------------------------<  112<H>  3.7   |  24  |  1.41<H>    Ca    8.8      07 Dec 2018 08:03                  Serum Pro-Brain Natriuretic Peptide: 866 pg/mL (18 @ 13:31)          < from: Xray Chest 2 Views PA/Lat (18 @ 11:22) >  IMPRESSION: There is persistence of right perihilar opacity suggesting   right-sided pneumonia. Opacity noted overlying the left upper lung field   appears decreased. Continued radiographic follow-up is recommended to   complete clearing.      < end of copied text >    LABS:                        11.0   19.9  )-----------( 352      ( 04 Dec 2018 13:31 )             36.4     12-    144  |  108  |  21<H>  ----------------------------<  105<H>  3.9   |  25  |  1.46<H>    Ca    9.4      04 Dec 2018 13:31  Mg     2.3     12-    TPro  8.2  /  Alb  3.1<L>  /  TBili  0.6  /  DBili  x   /  AST  12  /  ALT  13  /  AlkPhos  112  12-      Urinalysis Basic - ( 04 Dec 2018 18:06 )    Color: Yellow / Appearance: Clear / S.010 / pH: x  Gluc: x / Ketone: Negative  / Bili: Negative / Urobili: Negative   Blood: x / Protein: 15 / Nitrite: Negative   Leuk Esterase: Negative / RBC: 0-2 /HPF / WBC 0-2 /HPF   Sq Epi: x / Non Sq Epi: Occasional /HPF / Bacteria: Moderate /HPF      CAPILLARY BLOOD GLUCOSE      RADIOLOGY & ADDITIONAL TESTS:    Imaging  Reviewed:  YES  Consultant(s) Notes Reviewed:   YES    Plan of care was discussed with patient, all questions and concerns were addressed

## 2018-12-07 NOTE — PROGRESS NOTE ADULT - PROBLEM SELECTOR PLAN 1
CXR from 12/4 - new airspace opacity identified within the right upper lobe suggesting right-sided pneumonia. Likely CAP   WBC only slightly decreased from 12/4   Continue azithromycin and ceftriaxone, currently day 3   Blood cultures and RVP from 12/4 negative   Robitussin PRN   Pending repeat chest xray  f/u  legionella and strep penum antigen.   Pulm Dr. Jurado following

## 2018-12-07 NOTE — PROGRESS NOTE ADULT - PROBLEM SELECTOR PLAN 3
UA negative   UCx positive for enterococcus faecalis pending sensitivities  Continue with broad spectrum abx

## 2018-12-08 LAB
ANION GAP SERPL CALC-SCNC: 5 MMOL/L — SIGNIFICANT CHANGE UP (ref 5–17)
BUN SERPL-MCNC: 20 MG/DL — HIGH (ref 7–18)
CALCIUM SERPL-MCNC: 9.3 MG/DL — SIGNIFICANT CHANGE UP (ref 8.4–10.5)
CHLORIDE SERPL-SCNC: 116 MMOL/L — HIGH (ref 96–108)
CO2 SERPL-SCNC: 27 MMOL/L — SIGNIFICANT CHANGE UP (ref 22–31)
CREAT SERPL-MCNC: 1.28 MG/DL — SIGNIFICANT CHANGE UP (ref 0.5–1.3)
GLUCOSE SERPL-MCNC: 105 MG/DL — HIGH (ref 70–99)
HCT VFR BLD CALC: 29.6 % — LOW (ref 34.5–45)
HGB BLD-MCNC: 8.8 G/DL — LOW (ref 11.5–15.5)
MCHC RBC-ENTMCNC: 26.7 PG — LOW (ref 27–34)
MCHC RBC-ENTMCNC: 29.9 GM/DL — LOW (ref 32–36)
MCV RBC AUTO: 89.4 FL — SIGNIFICANT CHANGE UP (ref 80–100)
PLATELET # BLD AUTO: 385 K/UL — SIGNIFICANT CHANGE UP (ref 150–400)
POTASSIUM SERPL-MCNC: 4.2 MMOL/L — SIGNIFICANT CHANGE UP (ref 3.5–5.3)
POTASSIUM SERPL-SCNC: 4.2 MMOL/L — SIGNIFICANT CHANGE UP (ref 3.5–5.3)
RBC # BLD: 3.31 M/UL — LOW (ref 3.8–5.2)
RBC # FLD: 14.5 % — SIGNIFICANT CHANGE UP (ref 10.3–14.5)
SODIUM SERPL-SCNC: 148 MMOL/L — HIGH (ref 135–145)
WBC # BLD: 12.9 K/UL — HIGH (ref 3.8–10.5)
WBC # FLD AUTO: 12.9 K/UL — HIGH (ref 3.8–10.5)

## 2018-12-08 PROCEDURE — 71250 CT THORAX DX C-: CPT | Mod: 26

## 2018-12-08 RX ADMIN — PREGABALIN 1000 MICROGRAM(S): 225 CAPSULE ORAL at 11:46

## 2018-12-08 RX ADMIN — ANASTROZOLE 1 MILLIGRAM(S): 1 TABLET ORAL at 11:46

## 2018-12-08 RX ADMIN — CEFTRIAXONE 100 GRAM(S): 500 INJECTION, POWDER, FOR SOLUTION INTRAMUSCULAR; INTRAVENOUS at 18:06

## 2018-12-08 RX ADMIN — LITHIUM CARBONATE 150 MILLIGRAM(S): 300 TABLET, EXTENDED RELEASE ORAL at 05:10

## 2018-12-08 RX ADMIN — Medication 100 MILLIGRAM(S): at 11:47

## 2018-12-08 RX ADMIN — Medication 25 MILLIGRAM(S): at 11:47

## 2018-12-08 RX ADMIN — AZITHROMYCIN 250 MILLIGRAM(S): 500 TABLET, FILM COATED ORAL at 18:06

## 2018-12-08 RX ADMIN — HEPARIN SODIUM 5000 UNIT(S): 5000 INJECTION INTRAVENOUS; SUBCUTANEOUS at 17:39

## 2018-12-08 RX ADMIN — CINACALCET 30 MILLIGRAM(S): 30 TABLET, FILM COATED ORAL at 11:46

## 2018-12-08 RX ADMIN — LITHIUM CARBONATE 150 MILLIGRAM(S): 300 TABLET, EXTENDED RELEASE ORAL at 17:39

## 2018-12-08 RX ADMIN — PANTOPRAZOLE SODIUM 40 MILLIGRAM(S): 20 TABLET, DELAYED RELEASE ORAL at 05:10

## 2018-12-08 RX ADMIN — Medication 3 MILLILITER(S): at 09:58

## 2018-12-08 RX ADMIN — Medication 3 MILLILITER(S): at 14:53

## 2018-12-08 RX ADMIN — HEPARIN SODIUM 5000 UNIT(S): 5000 INJECTION INTRAVENOUS; SUBCUTANEOUS at 05:10

## 2018-12-08 NOTE — CHART NOTE - NSCHARTNOTEFT_GEN_A_CORE
I was called by radiologist and was told about CT scan of chest results. Results are suspicious for malignancy since there are multiple masses in both lungs. none

## 2018-12-08 NOTE — PROGRESS NOTE ADULT - ASSESSMENT
Rul bronchopneumonia   RUL mass with nodules RUL and LLL r/o carc  UTI with E. Fecalis  Parkinsonism  Obesity  RT breast Lumpectomy  Bipolar       PLAN  IR for CT Guided BX RUL mass   po augmentin  500 mg bid x 5 days  po zithromax x 4 days  Pet scan as out pt  add prednisone 40 mg qd x 4 days and d/c  CT Chest without contrast  Robitussin DNM 2 tsf qid  s/c Lovenox  will reevaluate in 2 days

## 2018-12-08 NOTE — PROGRESS NOTE ADULT - SUBJECTIVE AND OBJECTIVE BOX
Patient is a 78y old  Female who presents with a chief complaint of Cough (06 Dec 2018 09:39).   .  INTERVAL HPI/OVERNIGHT EVENTS: complained of coughing overnight, otherwise afebrile with no respiratory distress   s doing ok    .MEDICATIONS  (STANDING):  ALBUTerol/ipratropium for Nebulization 3 milliLiter(s) Nebulizer every 6 hours  anastrozole 1 milliGRAM(s) Oral daily  azithromycin  IVPB 500 milliGRAM(s) IV Intermittent every 24 hours  azithromycin  IVPB      bethanechol 25 milliGRAM(s) Oral daily  cefTRIAXone   IVPB      cefTRIAXone   IVPB 1 Gram(s) IV Intermittent every 24 hours  cinacalcet 30 milliGRAM(s) Oral daily  cyanocobalamin 1000 MICROGram(s) Oral daily  dextrose 5% + sodium chloride 0.9%. 1000 milliLiter(s) (75 mL/Hr) IV Continuous <Continuous>  heparin  Injectable 5000 Unit(s) SubCutaneous every 12 hours  lithium 150 milliGRAM(s) Oral two times a day  pantoprazole    Tablet 40 milliGRAM(s) Oral before breakfast  simvastatin 40 milliGRAM(s) Oral at bedtime    MEDICATIONS  (PRN):  guaiFENesin    Syrup 100 milliGRAM(s) Oral every 6 hours PRN Cough    ________________________________________________  REVIEW OF SYSTEMS:    CONSTITUTIONAL: No fever,   EYES: no acute visual disturbances  NECK: No pain or stiffness  RESPIRATORY: infrequent coughing; No shortness of breath  CARDIOVASCULAR: No chest pain, no palpitations  GASTROINTESTINAL: No pain. No nausea or vomiting; No diarrhea   NEUROLOGICAL: No headache or numbness, no tremors  MUSCULOSKELETAL: No joint pain, no muscle pain  GENITOURINARY: no dysuria, no frequency, no hesitancy  PSYCHIATRY: no depression , no anxiety  ALL OTHER  ROS negative      .Vital Signs Last 24 Hrs  T(C): 37 (08 Dec 2018 05:13), Max: 37 (08 Dec 2018 05:13)  T(F): 98.6 (08 Dec 2018 05:13), Max: 98.6 (08 Dec 2018 05:13)  HR: 97 (08 Dec 2018 05:13) (86 - 97)  BP: 140/78 (08 Dec 2018 05:13) (137/76 - 145/62)  BP(mean): --  RR: 16 (08 Dec 2018 05:13) (16 - 18)  SpO2: 96% (08 Dec 2018 05:13) (96% - 100%)  ________________________________________________  PHYSICAL EXAM:  GENERAL: NAD  HEENT: Normocephalic;  conjunctivae and sclerae clear; moist mucous membranes;   NECK : supple  CHEST/LUNG: bilateral rhonchi   HEART: S1 S2  regular; no murmurs, gallops or rubs  ABDOMEN: Soft, Nontender, Nondistended; Bowel sounds present  EXTREMITIES: no cyanosis; no edema; no calf tenderness  SKIN: warm and dry; no rash  NERVOUS SYSTEM:  Awake and alert; Oriented  to place, person and time ; no new deficits    _________________________________________________  LABS:                        9.3    13.0  )-----------( 314      ( 07 Dec 2018 08:03 )             29.5     12-    148<H>  |  116<H>  |  20<H>  ----------------------------<  105<H>  4.2   |  27  |  1.28    Ca    9.3      08 Dec 2018 07:25                              LABS:                        9.3    x     )-----------( 314      ( 07 Dec 2018 08:03 )             29.5     12-    146<H>  |  115<H>  |  29<H>  ----------------------------<  112<H>  3.7   |  24  |  1.41<H>    Ca    8.8      07 Dec 2018 08:03                  Serum Pro-Brain Natriuretic Peptide: 866 pg/mL (18 @ 13:31)          < from: Xray Chest 2 Views PA/Lat (12.06.18 @ 11:22) >  IMPRESSION: There is persistence of right perihilar opacity suggesting   right-sided pneumonia. Opacity noted overlying the left upper lung field   appears decreased. Continued radiographic follow-up is recommended to   complete clearing.      < end of copied text >    LABS:                        11.0   19.9  )-----------( 352      ( 04 Dec 2018 13:31 )             36.4     12-04    144  |  108  |  21<H>  ----------------------------<  105<H>  3.9   |  25  |  1.46<H>    Ca    9.4      04 Dec 2018 13:31  Mg     2.3     12-04    TPro  8.2  /  Alb  3.1<L>  /  TBili  0.6  /  DBili  x   /  AST  12  /  ALT  13  /  AlkPhos  112  12-04      Urinalysis Basic - ( 04 Dec 2018 18:06 )    Color: Yellow / Appearance: Clear / S.010 / pH: x  Gluc: x / Ketone: Negative  / Bili: Negative / Urobili: Negative   Blood: x / Protein: 15 / Nitrite: Negative   Leuk Esterase: Negative / RBC: 0-2 /HPF / WBC 0-2 /HPF   Sq Epi: x / Non Sq Epi: Occasional /HPF / Bacteria: Moderate /HPF      CAPILLARY BLOOD GLUCOSE      RADIOLOGY & ADDITIONAL TESTS:    Imaging  Reviewed:  YES  Consultant(s) Notes Reviewed:   YES    Plan of care was discussed with patient, all questions and concerns were addressed

## 2018-12-08 NOTE — PROGRESS NOTE ADULT - SUBJECTIVE AND OBJECTIVE BOX
PULMONARY  progress note    NINA CHRISTINE  MRN-[id number]    Patient is a 78y old  Female who presents with a chief complaint of Cough (08 Dec 2018 08:17)  feels better, less cough , CT chest reviewe-[facility]      MEDICATIONS  (STANDING):  ALBUTerol/ipratropium for Nebulization 3 milliLiter(s) Nebulizer every 6 hours  anastrozole 1 milliGRAM(s) Oral daily  azithromycin  IVPB 500 milliGRAM(s) IV Intermittent every 24 hours  azithromycin  IVPB      bethanechol 25 milliGRAM(s) Oral daily  cefTRIAXone   IVPB      cefTRIAXone   IVPB 1 Gram(s) IV Intermittent every 24 hours  cinacalcet 30 milliGRAM(s) Oral daily  cyanocobalamin 1000 MICROGram(s) Oral daily  dextrose 5% + sodium chloride 0.9%. 1000 milliLiter(s) (75 mL/Hr) IV Continuous <Continuous>  heparin  Injectable 5000 Unit(s) SubCutaneous every 12 hours  lithium 150 milliGRAM(s) Oral two times a day  pantoprazole    Tablet 40 milliGRAM(s) Oral before breakfast  simvastatin 40 milliGRAM(s) Oral at bedtime      MEDICATIONS  (PRN):  guaiFENesin    Syrup 100 milliGRAM(s) Oral every 6 hours PRN Cough      Allergies    codeine (Unknown)  melatonin (Drowsiness)  Thorazine (Unknown)        PAST MEDICAL & SURGICAL HISTORY:  HLD (hyperlipidemia)  Parkinson's disease  Bipolar 1 disorder  COPD (chronic obstructive pulmonary disease)  No significant past surgical history           REVIEW OF SYSTEMS:  CONSTITUTIONAL: No fever, weight loss, or fatigue   EYES: No eye pain, visual disturbances, or discharge  ENT:  No difficulty hearing, tinnitus, vertigo; No sinus or throat pain  NECK: No pain or stiffness or nodes  RESPIRATORY:  cough+   wheezing --  chills--   hemoptysis--  Shortness of Breath--  CARDIOVASCULAR: No chest pain, palpitations, passing out, dizziness, or leg swelling  GASTROINTESTINAL: No abdominal or epigastric pain. No nausea, vomiting, or hematemesis; No diarrhea or constipation. No melena or hematochezia.  GENITOURINARY: No dysuria, frequency, hematuria, or incontinence  MUSCULOSKELETAL: No joint pain or swelling; No muscle, back, or extremity pain  PSYCHIATRIC: No depression, anxiety, mood swings, or difficulty sleeping  HEME/LYMPH: No easy bruising, or bleeding gums  ALLERGY AND IMMUNOLOGIC: No hives or eczema    Vital Signs Last 24 Hrs  T(C): 37 (08 Dec 2018 05:13), Max: 37 (08 Dec 2018 05:13)  T(F): 98.6 (08 Dec 2018 05:13), Max: 98.6 (08 Dec 2018 05:13)  HR: 97 (08 Dec 2018 05:13) (86 - 97)  BP: 140/78 (08 Dec 2018 05:13) (137/76 - 145/62)  BP(mean): --  RR: 16 (08 Dec 2018 05:13) (16 - 18)  SpO2: 96% (08 Dec 2018 05:13) (96% - 100%)  I&O's Detail      PHYSICAL EXAMINATION:    GENERAL: The patient is a well-developed, well-nourished in no apparent distress.   SKIN no rash ecchymoses or bruises  HEENT: Head is normocephalic and atraumatic  YADI , Extraocular muscles are intact. Mucous membranes  moist.   Neck supple ,No LN felt JVP not increased  Thyroid not enlarged  Cardiovascular:  S1 S2 heard, RSR, No JVD , systolic  murmur at apex, No gallop or rub  Respiratory: Chest wall symmetrical with good air entry ,Percussion note normal,    Lungs vesicular breathing with rt apical   rales, no    wheeze	  ABDOMEN:  Soft, Non-tender, obese, no hepatomegaly or splenomegaly BS positive	  Extremities: Normal range of motion, No clubbing, cyanosis or edema  Vascular: Peripheral pulses palpable 2+ bilaterally  CNS:  Alert and oriented x3   Cranial nerves intact  sensory intact  motor power5/5  dtr 2+   Babinski neg    LABS:                        8.8    12.9  )-----------( 385      ( 08 Dec 2018 07:25 )             29.6     12-08    148<H>  |  116<H>  |  20<H>  ----------------------------<  105<H>  4.2   |  27  |  1.28    Ca    9.3      08 Dec 2018 07:25      MICROBIOLOGY:    Culture - Urine (collected 12-04-18 @ 23:59)  Source: .Urine Clean Catch (Midstream)  Final Report (12-06-18 @ 23:43):    50,000 - 99,000 CFU/mL Enterococcus faecalis  Organism: Enterococcus faecalis (12-06-18 @ 23:43)  Organism: Enterococcus faecalis (12-06-18 @ 23:43)      -  Ampicillin: S <=2 Predicts results to ampicillin/sulbactam, amoxacillin-clavulanate and  piperacillin-tazobactam.      -  Ciprofloxacin: R >2      -  Levofloxacin: R >4      -  Nitrofurantoin: S <=32 Should not be used to treat pyelonephritis.      -  Tetra/Doxy: R >8      -  Vancomycin: S 2      Method Type: ARTEM    Culture - Blood (collected 12-04-18 @ 18:15)  Source: .Blood Blood-Peripheral  Preliminary Report (12-05-18 @ 19:01):    No growth to date.    Culture - Blood (collected 12-04-18 @ 18:15)  Source: .Blood Blood-Peripheral  Preliminary Report (12-05-18 @ 19:01):    No growth to date.          RADIOLOGY & ADDITIONAL STUDIES:     CT CHEST; < from: CT Chest No Cont (12.08.18 @ 09:03) >  Lung parenchyma: Right upper lobe somewhat spiculated density lesion   measuring 2.3 x 2.9 cm worrisome for carcinoma. Smaller nodule in the   right upper lobe measuring 1.1 cm in diameter. 1.6 x 2.6 lesion in the   lateral aspect of the right upper lobe, abutting the pleura. Bilobed   nodule in the left lower lobe measures 1.3 cm in diameter bibasilar   scarring.    Pleural spaces:  No pleural effusions are seen.         Upper abdomen: Hyperdense exophytic nodule in the upper pole of the right   kidney measuring 1.9 x 2.1 cm,corresponding to the cyst seen on the   sonogram of 7/11/2018.

## 2018-12-09 DIAGNOSIS — R91.8 OTHER NONSPECIFIC ABNORMAL FINDING OF LUNG FIELD: ICD-10-CM

## 2018-12-09 LAB
CULTURE RESULTS: SIGNIFICANT CHANGE UP
CULTURE RESULTS: SIGNIFICANT CHANGE UP
SPECIMEN SOURCE: SIGNIFICANT CHANGE UP
SPECIMEN SOURCE: SIGNIFICANT CHANGE UP

## 2018-12-09 RX ORDER — SODIUM CHLORIDE 9 MG/ML
1000 INJECTION, SOLUTION INTRAVENOUS
Qty: 0 | Refills: 0 | Status: DISCONTINUED | OUTPATIENT
Start: 2018-12-09 | End: 2018-12-10

## 2018-12-09 RX ADMIN — Medication 1 TABLET(S): at 17:20

## 2018-12-09 RX ADMIN — Medication 3 MILLILITER(S): at 09:55

## 2018-12-09 RX ADMIN — LITHIUM CARBONATE 150 MILLIGRAM(S): 300 TABLET, EXTENDED RELEASE ORAL at 07:03

## 2018-12-09 RX ADMIN — CINACALCET 30 MILLIGRAM(S): 30 TABLET, FILM COATED ORAL at 13:05

## 2018-12-09 RX ADMIN — Medication 40 MILLIGRAM(S): at 17:20

## 2018-12-09 RX ADMIN — PREGABALIN 1000 MICROGRAM(S): 225 CAPSULE ORAL at 13:05

## 2018-12-09 RX ADMIN — Medication 25 MILLIGRAM(S): at 13:05

## 2018-12-09 RX ADMIN — ANASTROZOLE 1 MILLIGRAM(S): 1 TABLET ORAL at 13:05

## 2018-12-09 RX ADMIN — PANTOPRAZOLE SODIUM 40 MILLIGRAM(S): 20 TABLET, DELAYED RELEASE ORAL at 07:03

## 2018-12-09 RX ADMIN — Medication 3 MILLILITER(S): at 15:37

## 2018-12-09 RX ADMIN — HEPARIN SODIUM 5000 UNIT(S): 5000 INJECTION INTRAVENOUS; SUBCUTANEOUS at 17:22

## 2018-12-09 RX ADMIN — SIMVASTATIN 40 MILLIGRAM(S): 20 TABLET, FILM COATED ORAL at 22:03

## 2018-12-09 RX ADMIN — HEPARIN SODIUM 5000 UNIT(S): 5000 INJECTION INTRAVENOUS; SUBCUTANEOUS at 07:03

## 2018-12-09 RX ADMIN — LITHIUM CARBONATE 150 MILLIGRAM(S): 300 TABLET, EXTENDED RELEASE ORAL at 17:20

## 2018-12-09 NOTE — PROGRESS NOTE ADULT - SUBJECTIVE AND OBJECTIVE BOX
Patient is a 78y old  Female who presents with a chief complaint of Cough (06 Dec 2018 09:39).   .  INTERVAL HPI/OVERNIGHT EVENTS: complained of coughing overnight, otherwise afebrile with no respiratory distress   s doing better    .MEDICATIONS  (STANDING):  ALBUTerol/ipratropium for Nebulization 3 milliLiter(s) Nebulizer every 6 hours  amoxicillin  500 milliGRAM(s)/clavulanate 1 Tablet(s) Oral every 12 hours  anastrozole 1 milliGRAM(s) Oral daily  bethanechol 25 milliGRAM(s) Oral daily  cinacalcet 30 milliGRAM(s) Oral daily  cyanocobalamin 1000 MICROGram(s) Oral daily  dextrose 5% + sodium chloride 0.9%. 1000 milliLiter(s) (75 mL/Hr) IV Continuous <Continuous>  heparin  Injectable 5000 Unit(s) SubCutaneous every 12 hours  lithium 150 milliGRAM(s) Oral two times a day  pantoprazole    Tablet 40 milliGRAM(s) Oral before breakfast  predniSONE   Tablet 40 milliGRAM(s) Oral daily  simvastatin 40 milliGRAM(s) Oral at bedtime    MEDICATIONS  (PRN):  guaiFENesin    Syrup 100 milliGRAM(s) Oral every 6 hours PRN Cough    REVIEW OF SYSTEMS:    CONSTITUTIONAL: No fever,   EYES: no acute visual disturbances  NECK: No pain or stiffness  RESPIRATORY: infrequent coughing; No shortness of breath  CARDIOVASCULAR: No chest pain, no palpitations  GASTROINTESTINAL: No pain. No nausea or vomiting; No diarrhea   NEUROLOGICAL: No headache or numbness, no tremors  MUSCULOSKELETAL: No joint pain, no muscle pain  GENITOURINARY: no dysuria, no frequency, no hesitancy  PSYCHIATRY: no depression , no anxiety  ALL OTHER  ROS negative     .Vital Signs Last 24 Hrs  T(C): 36.6 (09 Dec 2018 14:05), Max: 36.8 (08 Dec 2018 20:27)  T(F): 97.8 (09 Dec 2018 14:05), Max: 98.2 (08 Dec 2018 20:27)  HR: 94 (09 Dec 2018 14:05) (88 - 94)  BP: 137/80 (09 Dec 2018 14:05) (134/65 - 137/80)  BP(mean): --  RR: 18 (09 Dec 2018 14:05) (16 - 18)  SpO2: 97% (09 Dec 2018 14:05) (96% - 100%)_____________      PHYSICAL EXAM:  GENERAL: NAD  HEENT: Normocephalic;  conjunctivae and sclerae clear; moist mucous membranes;   NECK : supple  CHEST/LUNG: bilateral rhonchi   HEART: S1 S2  regular; no murmurs, gallops or rubs  ABDOMEN: Soft, Nontender, Nondistended; Bowel sounds present  EXTREMITIES: no cyanosis; no edema; no calf tenderness  SKIN: warm and dry; no rash  NERVOUS SYSTEM:  Awake and alert; Oriented  to place, person and time ; no new deficits    _________________________________________________  LABS:                        8.8    12.9  )-----------( 385      ( 08 Dec 2018 07:25 )             29.6     12-    148<H>  |  116<H>  |  20<H>  ----------------------------<  105<H>  4.2   |  27  |  1.28    Ca    9.3      08 Dec 2018 07:25        < from: CT Chest No Cont (18 @ 09:03) >  IMPRESSION: Right upper lobe lesion measuring 2.3 x 2.9 cm, worrisome for   carcinoma. Tissue diagnosis is recommended. Left lower lobe lesion and to   right upper lobe peripheral lesions, worrisome for metastatic disease.   Other findings as described above.      < end of copied text >                      LABS:                        9.3    13.0  )-----------( 314      ( 07 Dec 2018 08:03 )             29.5     12-08    148<H>  |  116<H>  |  20<H>  ----------------------------<  105<H>  4.2   |  27  |  1.28    Ca    9.3      08 Dec 2018 07:25                              LABS:                        9.3    x     )-----------( 314      ( 07 Dec 2018 08:03 )             29.5     12-07    146<H>  |  115<H>  |  29<H>  ----------------------------<  112<H>  3.7   |  24  |  1.41<H>    Ca    8.8      07 Dec 2018 08:03                  Serum Pro-Brain Natriuretic Peptide: 866 pg/mL (18 @ 13:31)          < from: Xray Chest 2 Views PA/Lat (18 @ 11:22) >  IMPRESSION: There is persistence of right perihilar opacity suggesting   right-sided pneumonia. Opacity noted overlying the left upper lung field   appears decreased. Continued radiographic follow-up is recommended to   complete clearing.      < end of copied text >    LABS:                        11.0   19.9  )-----------( 352      ( 04 Dec 2018 13:31 )             36.4     12-    144  |  108  |  21<H>  ----------------------------<  105<H>  3.9   |  25  |  1.46<H>    Ca    9.4      04 Dec 2018 13:31  Mg     2.3     12-    TPro  8.2  /  Alb  3.1<L>  /  TBili  0.6  /  DBili  x   /  AST  12  /  ALT  13  /  AlkPhos  112  12-04      Urinalysis Basic - ( 04 Dec 2018 18:06 )    Color: Yellow / Appearance: Clear / S.010 / pH: x  Gluc: x / Ketone: Negative  / Bili: Negative / Urobili: Negative   Blood: x / Protein: 15 / Nitrite: Negative   Leuk Esterase: Negative / RBC: 0-2 /HPF / WBC 0-2 /HPF   Sq Epi: x / Non Sq Epi: Occasional /HPF / Bacteria: Moderate /HPF      CAPILLARY BLOOD GLUCOSE      RADIOLOGY & ADDITIONAL TESTS:    Imaging  Reviewed:  YES  Consultant(s) Notes Reviewed:   YES    Plan of care was discussed with patient, all questions and concerns were addressed

## 2018-12-10 VITALS
OXYGEN SATURATION: 98 % | DIASTOLIC BLOOD PRESSURE: 70 MMHG | TEMPERATURE: 98 F | HEART RATE: 88 BPM | RESPIRATION RATE: 16 BRPM | SYSTOLIC BLOOD PRESSURE: 138 MMHG

## 2018-12-10 LAB
ANION GAP SERPL CALC-SCNC: 9 MMOL/L — SIGNIFICANT CHANGE UP (ref 5–17)
APTT BLD: 29.4 SEC — SIGNIFICANT CHANGE UP (ref 27.5–36.3)
BUN SERPL-MCNC: 23 MG/DL — HIGH (ref 7–18)
CALCIUM SERPL-MCNC: 9.6 MG/DL — SIGNIFICANT CHANGE UP (ref 8.4–10.5)
CHLORIDE SERPL-SCNC: 113 MMOL/L — HIGH (ref 96–108)
CO2 SERPL-SCNC: 23 MMOL/L — SIGNIFICANT CHANGE UP (ref 22–31)
CREAT SERPL-MCNC: 1.47 MG/DL — HIGH (ref 0.5–1.3)
GLUCOSE SERPL-MCNC: 102 MG/DL — HIGH (ref 70–99)
HCT VFR BLD CALC: 33.6 % — LOW (ref 34.5–45)
HGB BLD-MCNC: 10.3 G/DL — LOW (ref 11.5–15.5)
INR BLD: 1.02 RATIO — SIGNIFICANT CHANGE UP (ref 0.88–1.16)
MCHC RBC-ENTMCNC: 27.9 PG — SIGNIFICANT CHANGE UP (ref 27–34)
MCHC RBC-ENTMCNC: 30.7 GM/DL — LOW (ref 32–36)
MCV RBC AUTO: 90.9 FL — SIGNIFICANT CHANGE UP (ref 80–100)
PLATELET # BLD AUTO: 499 K/UL — HIGH (ref 150–400)
POTASSIUM SERPL-MCNC: 4.6 MMOL/L — SIGNIFICANT CHANGE UP (ref 3.5–5.3)
POTASSIUM SERPL-SCNC: 4.6 MMOL/L — SIGNIFICANT CHANGE UP (ref 3.5–5.3)
PROTHROM AB SERPL-ACNC: 11.3 SEC — SIGNIFICANT CHANGE UP (ref 10–12.9)
RBC # BLD: 3.7 M/UL — LOW (ref 3.8–5.2)
RBC # FLD: 14.5 % — SIGNIFICANT CHANGE UP (ref 10.3–14.5)
SODIUM SERPL-SCNC: 145 MMOL/L — SIGNIFICANT CHANGE UP (ref 135–145)
WBC # BLD: 19.1 K/UL — HIGH (ref 3.8–10.5)
WBC # FLD AUTO: 19.1 K/UL — HIGH (ref 3.8–10.5)

## 2018-12-10 RX ORDER — BUDESONIDE AND FORMOTEROL FUMARATE DIHYDRATE 160; 4.5 UG/1; UG/1
2 AEROSOL RESPIRATORY (INHALATION)
Qty: 1 | Refills: 0 | OUTPATIENT
Start: 2018-12-10 | End: 2019-01-08

## 2018-12-10 RX ORDER — ANASTROZOLE 1 MG/1
1 TABLET ORAL
Qty: 0 | Refills: 0 | COMMUNITY
Start: 2018-12-10

## 2018-12-10 RX ORDER — LITHIUM CARBONATE 300 MG/1
1 TABLET, EXTENDED RELEASE ORAL
Qty: 0 | Refills: 0 | COMMUNITY
Start: 2018-12-10

## 2018-12-10 RX ORDER — SIMVASTATIN 20 MG/1
1 TABLET, FILM COATED ORAL
Qty: 0 | Refills: 0 | COMMUNITY

## 2018-12-10 RX ORDER — SIMVASTATIN 20 MG/1
1 TABLET, FILM COATED ORAL
Qty: 0 | Refills: 0 | COMMUNITY
Start: 2018-12-10

## 2018-12-10 RX ORDER — ANASTROZOLE 1 MG/1
1 TABLET ORAL
Qty: 0 | Refills: 0 | COMMUNITY

## 2018-12-10 RX ADMIN — CINACALCET 30 MILLIGRAM(S): 30 TABLET, FILM COATED ORAL at 12:23

## 2018-12-10 RX ADMIN — Medication 40 MILLIGRAM(S): at 12:23

## 2018-12-10 RX ADMIN — ANASTROZOLE 1 MILLIGRAM(S): 1 TABLET ORAL at 12:22

## 2018-12-10 RX ADMIN — HEPARIN SODIUM 5000 UNIT(S): 5000 INJECTION INTRAVENOUS; SUBCUTANEOUS at 17:44

## 2018-12-10 RX ADMIN — Medication 1 TABLET(S): at 12:22

## 2018-12-10 RX ADMIN — Medication 1 TABLET(S): at 17:44

## 2018-12-10 RX ADMIN — PREGABALIN 1000 MICROGRAM(S): 225 CAPSULE ORAL at 12:22

## 2018-12-10 RX ADMIN — Medication 3 MILLILITER(S): at 09:13

## 2018-12-10 RX ADMIN — LITHIUM CARBONATE 150 MILLIGRAM(S): 300 TABLET, EXTENDED RELEASE ORAL at 17:44

## 2018-12-10 RX ADMIN — LITHIUM CARBONATE 150 MILLIGRAM(S): 300 TABLET, EXTENDED RELEASE ORAL at 06:36

## 2018-12-10 RX ADMIN — Medication 25 MILLIGRAM(S): at 12:23

## 2018-12-10 NOTE — PROGRESS NOTE ADULT - ATTENDING COMMENTS
I have examined pt personally Hx chart lab and xrays reviewed and pt discussed with residents
continue other meds and care   pul eval dr jarrett
continue other meds and care   repeat cxr and labs  pulmonary eval
continue current care  ct chest pul follow up  d/c planning after cleared buy pulmonary dr jarrett
continue current care  ct chest pul follow up  d/c planning after cleared buy pulmonary dr jarrett
CONTINUE CURRENT CARE   CONCERN FOR CA LUNG   IR FOR CT GUIDED LUNG BX
patient was seen and examined   above reviewed and agreed   d/w pulmonary   out patient repeat ct in 3- 4 weeks   further work up as out patient   miles of prednisone  d/c planning

## 2018-12-10 NOTE — PROGRESS NOTE ADULT - PROBLEM SELECTOR PLAN 1
CXR from 12/4 - new airspace opacity identified within the right upper lobe suggesting right-sided pneumonia. Likely CAP   WBC improving  Augmentin PO, currently on day 7 of abx  Blood cultures and RVP from 12/4 negative   Robitussin PRN   Pulm Dr. Jurado following CT results as above   Per pulm, plan for IR guided biopsy possibly today  NPO, heparin sq held this AM   PET scan as outpatient CT results as above   Per IR, finish course of antibiotics prior to lung biopsy  Per pulm, ok for repeat ct chest in 3-4 weeks and IR guided biopsy as outpatient if indicated and PET scan as outpatient CT results as above   Per IR, finish course of antibiotics prior to lung biopsy  As discussed with Dr. Jurado, pulm, ok for repeat ct chest in 3-4 weeks and IR guided biopsy as outpatient if indicated and PET scan as outpatient

## 2018-12-10 NOTE — PROGRESS NOTE ADULT - PROBLEM SELECTOR PLAN 6
DVT prophylaxis with heparin sq  On protonix PO for GI prophylaxis Continue home dose of Lithium 150 mg BID   Lithium level 0.9

## 2018-12-10 NOTE — PROGRESS NOTE ADULT - PROBLEM SELECTOR PLAN 2
Hx of CKD stage 4 with baseline Cr 1.4-1.5  Cr now ?  Continue to CXR from 12/4 - new airspace opacity identified within the right upper lobe suggesting right-sided pneumonia. Likely CAP   WBC improving  Augmentin PO, currently on day 7 of abx  Blood cultures and RVP from 12/4 negative   Robitussin PRN   PO prednisone x 5 days   Pulm Dr. Jurado following CXR from 12/4 - new airspace opacity identified within the right upper lobe suggesting right-sided pneumonia. Likely CAP   WBC initially improved, now 19.1  Augmentin PO, currently on day 7 of abx  Blood cultures and RVP from 12/4 negative   Robitussin PRN   PO prednisone x 5 days   Pulm Dr. Jurado following CXR from 12/4 - new airspace opacity identified within the right upper lobe suggesting right-sided pneumonia. Likely CAP   WBC initially improved, now 19.1, likely from prednisone   Augmentin PO, currently on day 7 of abx  Blood cultures and RVP from 12/4 negative   Robitussin PRN   PO prednisone x 5 days   Pulm Dr. Jurado following

## 2018-12-10 NOTE — PROGRESS NOTE ADULT - NSHPATTENDINGPLANDISCUSS_GEN_ALL_CORE
staff and residents
resident and patient
NP and paients family and patient.
np , patient and her family.
rn patient and her family.
rn ,patient and her family.
np, pulmonary , patient , her family and her pcp dr benavides

## 2018-12-10 NOTE — PROGRESS NOTE ADULT - SUBJECTIVE AND OBJECTIVE BOX
NP Note discussed with  Primary Attending    Patient is a 78y old  Female who presents with a chief complaint of Cough (09 Dec 2018 16:09)    78 Year old female from [facility], walks with walker, with PMHx of COPD, Bipolar disorder, Parkinsonism, CKD Brought to ED by EMS cough. Patient was very lethargic, O2 sat was in 85- 90's at NH. In Ed patient was agitated, received Haldol, ativan and benadryl. On admission,  patient was afebrile, HD stable, RR 17, O2 sat 97 at room air, cbc leucocytosis 19k, BUN 21, Cr 1.4, , CXR showed   new airspace opacity identified within the right upper lobe suggesting right-sided pneumonia. Questionable opacity is identified within the left midlung field overlying the anterior left fourth rib.  Admitted for right sided PNA , now currently on day 7 of antibiotics, now PO. Agitation has resolved and patient is now cooperative with care.     INTERVAL HPI/OVERNIGHT EVENTS: Endorses occasional cough     MEDICATIONS  (STANDING):  ALBUTerol/ipratropium for Nebulization 3 milliLiter(s) Nebulizer every 6 hours  amoxicillin  500 milliGRAM(s)/clavulanate 1 Tablet(s) Oral every 12 hours  anastrozole 1 milliGRAM(s) Oral daily  bethanechol 25 milliGRAM(s) Oral daily  cinacalcet 30 milliGRAM(s) Oral daily  cyanocobalamin 1000 MICROGram(s) Oral daily  dextrose 5% + sodium chloride 0.9%. 1000 milliLiter(s) (75 mL/Hr) IV Continuous <Continuous>  heparin  Injectable 5000 Unit(s) SubCutaneous every 12 hours  lithium 150 milliGRAM(s) Oral two times a day  pantoprazole    Tablet 40 milliGRAM(s) Oral before breakfast  predniSONE   Tablet 40 milliGRAM(s) Oral daily  simvastatin 40 milliGRAM(s) Oral at bedtime    MEDICATIONS  (PRN):  guaiFENesin    Syrup 100 milliGRAM(s) Oral every 6 hours PRN Cough      ____________________________________________  REVIEW OF SYSTEMS:    CONSTITUTIONAL: No fever,   EYES: no acute visual disturbances  NECK: No pain or stiffness  RESPIRATORY: Occaisional cough; No shortness of breath  CARDIOVASCULAR: No chest pain, no palpitations  GASTROINTESTINAL: No pain. No nausea or vomiting; No diarrhea   NEUROLOGICAL: No headache or numbness, no tremors  MUSCULOSKELETAL: No joint pain, no muscle pain  GENITOURINARY: no dysuria, no frequency, no hesitancy  PSYCHIATRY: no depression , no anxiety  ALL OTHER  ROS negative        Vital Signs Last 24 Hrs  T(C): 36.2 (10 Dec 2018 05:35), Max: 36.8 (09 Dec 2018 20:58)  T(F): 97.2 (10 Dec 2018 05:35), Max: 98.3 (09 Dec 2018 20:58)  HR: 89 (10 Dec 2018 05:35) (88 - 94)  BP: 136/51 (10 Dec 2018 05:35) (136/51 - 139/94)  BP(mean): --  RR: 16 (10 Dec 2018 05:35) (16 - 18)  SpO2: 97% (10 Dec 2018 05:35) (96% - 97%)    ________________________________________________  PHYSICAL EXAM:  GENERAL: NAD  HEENT: Normocephalic;  conjunctivae and sclerae clear; moist mucous membranes;   NECK : supple  CHEST/LUNG: Diminished bilaterally   HEART: S1 S2  regular; no murmurs, gallops or rubs  ABDOMEN: Soft, Nontender, Nondistended; Bowel sounds present  EXTREMITIES: no cyanosis; no edema; no calf tenderness  SKIN: warm and dry; no rash  NERVOUS SYSTEM:  Awake and alert; Oriented  to place, person and time ; no new deficits    _________________________________________________  LABS:    CAPILLARY BLOOD GLUCOSE    RADIOLOGY & ADDITIONAL TESTS:    < from: CT Chest No Cont (12.08.18 @ 09:03) >  IMPRESSION: Right upper lobe lesion measuring 2.3 x 2.9 cm, worrisome for   carcinoma. Tissue diagnosis is recommended. Left lower lobe lesion and to   right upper lobe peripheral lesions, worrisome for metastatic disease.   Other findings as described above.      < end of copied text >      Imaging  Reviewed:  YES  Consultant(s) Notes Reviewed:   YES    Plan of care was discussed with patient and /or primary care giver; all questions and concerns were addressed NP Note discussed with  Primary Attending    Patient is a 78y old  Female who presents with a chief complaint of Cough (09 Dec 2018 16:09)    78 Year old female from [facility], walks with walker, with PMHx of COPD, Bipolar disorder, Parkinsonism, CKD Brought to ED by EMS cough. Patient was very lethargic, O2 sat was in 85- 90's at NH. In Ed patient was agitated, received Haldol, ativan and benadryl. On admission,  patient was afebrile, HD stable, RR 17, O2 sat 97 at room air, cbc leucocytosis 19k, BUN 21, Cr 1.4, , CXR showed   new airspace opacity identified within the right upper lobe suggesting right-sided pneumonia. Questionable opacity is identified within the left midlung field overlying the anterior left fourth rib.  Admitted for right sided PNA , now currently on day 7 of antibiotics, now PO. Agitation has resolved and patient is now cooperative with care.     INTERVAL HPI/OVERNIGHT EVENTS: Endorses occasional cough     MEDICATIONS  (STANDING):  ALBUTerol/ipratropium for Nebulization 3 milliLiter(s) Nebulizer every 6 hours  amoxicillin  500 milliGRAM(s)/clavulanate 1 Tablet(s) Oral every 12 hours  anastrozole 1 milliGRAM(s) Oral daily  bethanechol 25 milliGRAM(s) Oral daily  cinacalcet 30 milliGRAM(s) Oral daily  cyanocobalamin 1000 MICROGram(s) Oral daily  dextrose 5% + sodium chloride 0.9%. 1000 milliLiter(s) (75 mL/Hr) IV Continuous <Continuous>  heparin  Injectable 5000 Unit(s) SubCutaneous every 12 hours  lithium 150 milliGRAM(s) Oral two times a day  pantoprazole    Tablet 40 milliGRAM(s) Oral before breakfast  predniSONE   Tablet 40 milliGRAM(s) Oral daily  simvastatin 40 milliGRAM(s) Oral at bedtime    MEDICATIONS  (PRN):  guaiFENesin    Syrup 100 milliGRAM(s) Oral every 6 hours PRN Cough      ____________________________________________  REVIEW OF SYSTEMS:    CONSTITUTIONAL: No fever,   EYES: no acute visual disturbances  NECK: No pain or stiffness  RESPIRATORY: Occaisional cough; No shortness of breath  CARDIOVASCULAR: No chest pain, no palpitations  GASTROINTESTINAL: No pain. No nausea or vomiting; No diarrhea   NEUROLOGICAL: No headache or numbness, no tremors  MUSCULOSKELETAL: No joint pain, no muscle pain  GENITOURINARY: no dysuria, no frequency, no hesitancy  PSYCHIATRY: no depression , no anxiety  ALL OTHER  ROS negative        Vital Signs Last 24 Hrs  T(C): 36.2 (10 Dec 2018 05:35), Max: 36.8 (09 Dec 2018 20:58)  T(F): 97.2 (10 Dec 2018 05:35), Max: 98.3 (09 Dec 2018 20:58)  HR: 89 (10 Dec 2018 05:35) (88 - 94)  BP: 136/51 (10 Dec 2018 05:35) (136/51 - 139/94)  BP(mean): --  RR: 16 (10 Dec 2018 05:35) (16 - 18)  SpO2: 97% (10 Dec 2018 05:35) (96% - 97%)    ________________________________________________  PHYSICAL EXAM:  GENERAL: NAD  HEENT: Normocephalic;  conjunctivae and sclerae clear; moist mucous membranes;   NECK : supple  CHEST/LUNG: Diminished bilaterally   HEART: S1 S2  regular; no murmurs, gallops or rubs  ABDOMEN: Soft, Nontender, Nondistended; Bowel sounds present  EXTREMITIES: no cyanosis; no edema; no calf tenderness  SKIN: warm and dry; no rash  NERVOUS SYSTEM:  Awake and alert; Oriented  to place, person and time ; no new deficits    _________________________________________________  LABS:  LABS:                        10.3   19.1  )-----------( 499      ( 10 Dec 2018 08:43 )             33.6     12-10    145  |  113<H>  |  23<H>  ----------------------------<  102<H>  4.6   |  23  |  1.47<H>    Ca    9.6      10 Dec 2018 08:43      PT/INR - ( 10 Dec 2018 08:43 )   PT: 11.3 sec;   INR: 1.02 ratio         PTT - ( 10 Dec 2018 08:43 )  PTT:29.4 sec                        CAPILLARY BLOOD GLUCOSE    RADIOLOGY & ADDITIONAL TESTS:    < from: CT Chest No Cont (12.08.18 @ 09:03) >  IMPRESSION: Right upper lobe lesion measuring 2.3 x 2.9 cm, worrisome for   carcinoma. Tissue diagnosis is recommended. Left lower lobe lesion and to   right upper lobe peripheral lesions, worrisome for metastatic disease.   Other findings as described above.      < end of copied text >      Imaging  Reviewed:  YES  Consultant(s) Notes Reviewed:   YES    Plan of care was discussed with patient and /or primary care giver; all questions and concerns were addressed

## 2018-12-10 NOTE — PROGRESS NOTE ADULT - SUBJECTIVE AND OBJECTIVE BOX
PULMONARY  progress note    NINA CHRISTINE  MRN-[id number]    Patient is a 78y old  Female who presents with a chief complaint of Cough (10 Dec 2018 08:58)  Breathing better, less cough, Being scheduled for CT guided Bx      MEDICATIONS  (STANDING):  ALBUTerol/ipratropium for Nebulization 3 milliLiter(s) Nebulizer every 6 hours  amoxicillin  500 milliGRAM(s)/clavulanate 1 Tablet(s) Oral every 12 hours  anastrozole 1 milliGRAM(s) Oral daily  bethanechol 25 milliGRAM(s) Oral daily  cinacalcet 30 milliGRAM(s) Oral daily  cyanocobalamin 1000 MICROGram(s) Oral daily  dextrose 5% + sodium chloride 0.9%. 1000 milliLiter(s) (75 mL/Hr) IV Continuous <Continuous>  heparin  Injectable 5000 Unit(s) SubCutaneous every 12 hours  lithium 150 milliGRAM(s) Oral two times a day  pantoprazole    Tablet 40 milliGRAM(s) Oral before breakfast  predniSONE   Tablet 40 milliGRAM(s) Oral daily  simvastatin 40 milliGRAM(s) Oral at bedtime    Allergies    codeine (Unknown)  melatonin (Drowsiness)  Thorazine (Unknown)      PAST MEDICAL & SURGICAL HISTORY:  HLD (hyperlipidemia)  Parkinson's disease  Bipolar 1 disorder  COPD (chronic obstructive pulmonary disease)  No significant past surgical history           REVIEW OF SYSTEMS:  CONSTITUTIONAL: No fever, weight loss, or fatigue   EYES: No eye pain, visual disturbances, or discharge  ENT:  No difficulty hearing, tinnitus, vertigo; No sinus or throat pain  NECK: No pain or stiffness or nodes  RESPIRATORY:  cough--   wheezing--   chills--   hemoptysis--  Shortness of Breath--  CARDIOVASCULAR: No chest pain, palpitations, passing out, dizziness, or leg swelling  GASTROINTESTINAL: No abdominal or epigastric pain. No nausea, vomiting, or hematemesis; No diarrhea or constipation. No melena or hematochezia.  GENITOURINARY: No dysuria, frequency, hematuria, or incontinence  HEME/LYMPH: No easy bruising, or bleeding gums  ALLERGY AND IMMUNOLOGIC: No hives or eczema    Vital Signs Last 24 Hrs  T(C): 36.2 (10 Dec 2018 05:35), Max: 36.8 (09 Dec 2018 20:58)  T(F): 97.2 (10 Dec 2018 05:35), Max: 98.3 (09 Dec 2018 20:58)  HR: 89 (10 Dec 2018 05:35) (88 - 94)  BP: 136/51 (10 Dec 2018 05:35) (136/51 - 139/94)  BP(mean): --  RR: 16 (10 Dec 2018 05:35) (16 - 18)  SpO2: 97% (10 Dec 2018 05:35) (96% - 97%)  I&O's Detail      PHYSICAL EXAMINATION:    GENERAL: The patient is a well-developed, well-nourished in no apparent distress.   SKIN no rash ecchymoses or bruises  HEENT: Head is normocephalic and atraumatic  YADI , Extraocular muscles are intact. Mucous membranes  moist.   Neck supple ,No LN felt JVP not increased  Thyroid not enlarged  Cardiovascular:  S1 S2 heard, RSR, No JVD , systolic  murmur at apex, No gallop or rub  Respiratory: Chest wall symmetrical with good air entry ,Percussion note normal,    Lungs vesicular breathing with  no  rales  or  wheeze	  ABDOMEN:  Soft, Non-tender, Obese, no hepatomegaly or splenomegaly BS positive	  Extremities: Normal range of motion, No clubbing, cyanosis or edema, tremors hands  Vascular: Peripheral pulses palpable 2+ bilaterally  CNS:  Alert and oriented x3   Cranial nerves intact  sensory intact  motor power5/5  dtr 2+   Babinski neg    LABS:                        10.3   19.1  )-----------( 499      ( 10 Dec 2018 08:43 )             33.6     12-10    145  |  113<H>  |  23<H>  ----------------------------<  102<H>  4.6   |  23  |  1.47<H>    Ca    9.6      10 Dec 2018 08:43      PT/INR - ( 10 Dec 2018 08:43 )   PT: 11.3 sec;   INR: 1.02 ratio         PTT - ( 10 Dec 2018 08:43 )  PTT:29.4 sec        MICROBIOLOGY:    Culture - Urine (collected 12-04-18 @ 23:59)  Source: .Urine Clean Catch (Midstream)  Final Report (12-06-18 @ 23:43):    50,000 - 99,000 CFU/mL Enterococcus faecalis  Organism: Enterococcus faecalis (12-06-18 @ 23:43)  Organism: Enterococcus faecalis (12-06-18 @ 23:43)      -  Ampicillin: S <=2 Predicts results to ampicillin/sulbactam, amoxacillin-clavulanate and  piperacillin-tazobactam.      -  Ciprofloxacin: R >2      -  Levofloxacin: R >4      -  Nitrofurantoin: S <=32 Should not be used to treat pyelonephritis.      -  Tetra/Doxy: R >8      -  Vancomycin: S 2      Method Type: ARTEM    Culture - Blood (collected 12-04-18 @ 18:15)  Source: .Blood Blood-Peripheral  Final Report (12-09-18 @ 19:00):    No growth at 5 days.    Culture - Blood (collected 12-04-18 @ 18:15)  Source: .Blood Blood-Peripheral  Final Report (12-09-18 @ 19:00):    No growth at 5 days.          RADIOLOGY & ADDITIONAL STUDIES:    CT CHEST; < from: CT Chest No Cont (12.08.18 @ 09:03) >  Right upper lobe lesion measuring 2.3 x 2.9 cm, worrisome for   carcinoma. Tissue diagnosis is recommended. Left lower lobe lesion and to   right upper lobe peripheral lesions, worrisome for metastatic disease      ECHO:

## 2018-12-10 NOTE — PROGRESS NOTE ADULT - ASSESSMENT
Rul bronchopneumonia   RUL mass with nodules RUL and LLL r/o carc  UTI with E. Fecalis  Parkinsonism  Obesity  RT breast Lumpectomy  Bipolar       PLAN  IR for CT Guided BX RUL mass  d/c prednisone  Jirmvmges323/4.5 2 puffs BID   po augmentin  500 mg bid x 5 days  po zithromax x 2 days and d/c  Pet scan as out pt  add prednisone 40 mg qd x 4 days and d/c  CT Chest without contrast  Robitussin DM 2 tsf qid  s/c Lovenox

## 2018-12-10 NOTE — PROGRESS NOTE ADULT - PROBLEM SELECTOR PLAN 4
No wheezing on exam  Continue duonebs UA negative   UCx positive for enterococcus faecalis sensitive to Augmentin  Continue Augmentin

## 2018-12-10 NOTE — PROGRESS NOTE ADULT - PROBLEM SELECTOR PLAN 7
Stable, not currently on medications DVT prophylaxis with heparin sq, currently on hold for possible IR biopsy   On protonix PO for GI prophylaxis

## 2018-12-10 NOTE — PROGRESS NOTE ADULT - PROBLEM SELECTOR PLAN 3
UA negative   UCx positive for enterococcus faecalis pending sensitivities  Continue with broad spectrum abx Hx of CKD stage 4 with baseline Cr 1.4-1.5  Cr now ?  Continue to Hx of CKD stage 4 with baseline Cr 1.4-1.5  Cr now 1.47  Continue to monitor BMP

## 2018-12-13 ENCOUNTER — MOBILE ON CALL (OUTPATIENT)
Age: 78
End: 2018-12-13

## 2018-12-26 PROCEDURE — 82306 VITAMIN D 25 HYDROXY: CPT

## 2018-12-26 PROCEDURE — 87086 URINE CULTURE/COLONY COUNT: CPT

## 2018-12-26 PROCEDURE — 87186 SC STD MICRODIL/AGAR DIL: CPT

## 2018-12-26 PROCEDURE — 87899 AGENT NOS ASSAY W/OPTIC: CPT

## 2018-12-26 PROCEDURE — 87486 CHLMYD PNEUM DNA AMP PROBE: CPT

## 2018-12-26 PROCEDURE — 82009 KETONE BODYS QUAL: CPT

## 2018-12-26 PROCEDURE — 87581 M.PNEUMON DNA AMP PROBE: CPT

## 2018-12-26 PROCEDURE — 96374 THER/PROPH/DIAG INJ IV PUSH: CPT

## 2018-12-26 PROCEDURE — 83735 ASSAY OF MAGNESIUM: CPT

## 2018-12-26 PROCEDURE — 84443 ASSAY THYROID STIM HORMONE: CPT

## 2018-12-26 PROCEDURE — 93005 ELECTROCARDIOGRAM TRACING: CPT

## 2018-12-26 PROCEDURE — 87798 DETECT AGENT NOS DNA AMP: CPT

## 2018-12-26 PROCEDURE — 85610 PROTHROMBIN TIME: CPT

## 2018-12-26 PROCEDURE — 85730 THROMBOPLASTIN TIME PARTIAL: CPT

## 2018-12-26 PROCEDURE — 87040 BLOOD CULTURE FOR BACTERIA: CPT

## 2018-12-26 PROCEDURE — 96372 THER/PROPH/DIAG INJ SC/IM: CPT | Mod: XU

## 2018-12-26 PROCEDURE — 83036 HEMOGLOBIN GLYCOSYLATED A1C: CPT

## 2018-12-26 PROCEDURE — 83880 ASSAY OF NATRIURETIC PEPTIDE: CPT

## 2018-12-26 PROCEDURE — 81001 URINALYSIS AUTO W/SCOPE: CPT

## 2018-12-26 PROCEDURE — 85027 COMPLETE CBC AUTOMATED: CPT

## 2018-12-26 PROCEDURE — 80053 COMPREHEN METABOLIC PANEL: CPT

## 2018-12-26 PROCEDURE — 80048 BASIC METABOLIC PNL TOTAL CA: CPT

## 2018-12-26 PROCEDURE — 80061 LIPID PANEL: CPT

## 2018-12-26 PROCEDURE — 80178 ASSAY OF LITHIUM: CPT

## 2018-12-26 PROCEDURE — 83605 ASSAY OF LACTIC ACID: CPT

## 2018-12-26 PROCEDURE — 94640 AIRWAY INHALATION TREATMENT: CPT

## 2018-12-26 PROCEDURE — 82803 BLOOD GASES ANY COMBINATION: CPT

## 2018-12-26 PROCEDURE — 99285 EMERGENCY DEPT VISIT HI MDM: CPT | Mod: 25

## 2018-12-26 PROCEDURE — 87449 NOS EACH ORGANISM AG IA: CPT

## 2018-12-26 PROCEDURE — 96375 TX/PRO/DX INJ NEW DRUG ADDON: CPT

## 2018-12-26 PROCEDURE — 87633 RESP VIRUS 12-25 TARGETS: CPT

## 2018-12-26 PROCEDURE — 71046 X-RAY EXAM CHEST 2 VIEWS: CPT

## 2018-12-26 PROCEDURE — 71045 X-RAY EXAM CHEST 1 VIEW: CPT

## 2019-02-05 NOTE — ED ADULT TRIAGE NOTE - CCCP TRG CHIEF CMPLNT
65 y/o female, PMH of HTN, hypothyroid, with c/o pain in the abdomen, noticed bulging in the umbilical area. Had the hernia for couple of yrs, but getting bigger in the last few months. C/o of pain in the mid abdominal area and right groin. Was seen by PCP, referred to Dr Rodriguez. Had MRI done, Incidental finding of cysts on the adrenal grand, pancreas and fatty liver. Will follow up with doctor after surgery. Diagnosed with ventral hernia. Scheduled for repair of ventral hernia. Pre op testing today.
fall/lacerations

## 2019-02-25 ENCOUNTER — APPOINTMENT (OUTPATIENT)
Dept: NEUROLOGY | Facility: CLINIC | Age: 79
End: 2019-02-25

## 2019-02-28 NOTE — DISCHARGE NOTE ADULT - PLAN OF CARE
Aaliyah Alan was admitted to [facility] from home via cart accompanied by Other Coleen Bautista.   Reason for hospitalization is CHF exacerbation and hypoxia..   Upon arrival, patient is stable. Patient has history significant for COPD, CHF, CKD.  Patient oriented to bed, call light,  and room.  Patient provided with the following educational materials upon admission:safety and pain.   Level of understanding patient verbalized understanding.   Admission orders received at this time.  See Epic documentation for patient individualized nursing care plan.   c/w statin c/w home medications c/w duoneb prn c/w home ledications, f/u lithium level at NH Likely Brandon on Ckd, now at baseline, encourage oral water intake r/o fractures, acute injuries Patient refused x rays and Ct head, deemed to have capacity by psych c/w home medications, hold dose today as level 1.8, repeat lithium level am and may need to decrease dose lithium 300 am and 150 pm

## 2019-03-31 ENCOUNTER — EMERGENCY (EMERGENCY)
Facility: HOSPITAL | Age: 79
LOS: 1 days | Discharge: HOME | End: 2019-03-31
Attending: EMERGENCY MEDICINE
Payer: MEDICARE

## 2019-03-31 VITALS
DIASTOLIC BLOOD PRESSURE: 85 MMHG | WEIGHT: 145.06 LBS | TEMPERATURE: 99 F | SYSTOLIC BLOOD PRESSURE: 156 MMHG | OXYGEN SATURATION: 96 % | RESPIRATION RATE: 16 BRPM | HEART RATE: 85 BPM

## 2019-03-31 VITALS
DIASTOLIC BLOOD PRESSURE: 71 MMHG | OXYGEN SATURATION: 98 % | RESPIRATION RATE: 18 BRPM | HEART RATE: 91 BPM | SYSTOLIC BLOOD PRESSURE: 128 MMHG | TEMPERATURE: 99 F

## 2019-03-31 PROCEDURE — 99283 EMERGENCY DEPT VISIT LOW MDM: CPT

## 2019-03-31 RX ORDER — VALACYCLOVIR 500 MG/1
1000 TABLET, FILM COATED ORAL ONCE
Qty: 0 | Refills: 0 | Status: COMPLETED | OUTPATIENT
Start: 2019-03-31 | End: 2019-03-31

## 2019-03-31 RX ORDER — VALACYCLOVIR 500 MG/1
1 TABLET, FILM COATED ORAL
Qty: 13 | Refills: 0 | OUTPATIENT
Start: 2019-03-31 | End: 2019-04-06

## 2019-03-31 RX ORDER — CIPROFLOXACIN HCL 0.3 %
1 DROPS OPHTHALMIC (EYE) ONCE
Qty: 0 | Refills: 0 | Status: COMPLETED | OUTPATIENT
Start: 2019-03-31 | End: 2019-03-31

## 2019-03-31 RX ORDER — MUPIROCIN 20 MG/G
1 OINTMENT TOPICAL
Qty: 20 | Refills: 0 | OUTPATIENT
Start: 2019-03-31

## 2019-03-31 RX ADMIN — VALACYCLOVIR 1000 MILLIGRAM(S): 500 TABLET, FILM COATED ORAL at 15:00

## 2019-03-31 RX ADMIN — Medication 1 DROP(S): at 15:00

## 2019-03-31 NOTE — ED ADULT NURSE NOTE - NSIMPLEMENTINTERV_GEN_ALL_ED
Implemented All Universal Safety Interventions:  [place] to call system. Call bell, personal items and telephone within reach. Instruct patient to call for assistance. Room bathroom lighting operational. Non-slip footwear when patient is off stretcher. Physically safe environment: no spills, clutter or unnecessary equipment. Stretcher in lowest position, wheels locked, appropriate side rails in place.

## 2019-03-31 NOTE — ED PROVIDER NOTE - CLINICAL SUMMARY MEDICAL DECISION MAKING FREE TEXT BOX
pt appears to have dried up shingles, mild corneal abrasion, will give Valtrex, bactroban & ciloxan oph drops, advised to f/u with ophth

## 2019-03-31 NOTE — ED PROVIDER NOTE - OBJECTIVE STATEMENT
80 y/o F with PMHx of COPD, HLD, Parkinson's disease and no significant PSHx presents to the ED from [facility] with complaints of rash to R face. Patient states she was scratching underneath her eye as she was unable to see. Patient is accompanied by boyfriend and refusing initial examination. History is limited as patient is uncooperative and does not wish to provide additional information. 80 y/o F with PMHx of COPD, HLD, Parkinson's disease and no significant PSHx presents to the ED from [facility] with complaints of rash to R face. Patient states she was scratching underneath her eye. Patient is accompanied by boyfriend and refusing initial examination. History is limited as patient is uncooperative and does not wish to provide additional information.

## 2019-04-26 NOTE — ED PROVIDER NOTE - DISPOSITION TYPE
Jocelin TINAJEROiscussed 
DISCHARGE

## 2019-08-20 ENCOUNTER — EMERGENCY (EMERGENCY)
Facility: HOSPITAL | Age: 79
LOS: 1 days | Discharge: HOME | End: 2019-08-20
Attending: EMERGENCY MEDICINE
Payer: MEDICARE

## 2019-08-20 VITALS
SYSTOLIC BLOOD PRESSURE: 138 MMHG | DIASTOLIC BLOOD PRESSURE: 83 MMHG | TEMPERATURE: 97 F | RESPIRATION RATE: 2 BRPM | WEIGHT: 141.98 LBS | HEART RATE: 98 BPM | OXYGEN SATURATION: 98 % | HEIGHT: 60 IN

## 2019-08-20 VITALS
HEART RATE: 86 BPM | TEMPERATURE: 99 F | SYSTOLIC BLOOD PRESSURE: 163 MMHG | RESPIRATION RATE: 19 BRPM | OXYGEN SATURATION: 95 % | DIASTOLIC BLOOD PRESSURE: 83 MMHG

## 2019-08-20 PROCEDURE — 99283 EMERGENCY DEPT VISIT LOW MDM: CPT | Mod: 25

## 2019-08-20 PROCEDURE — 71046 X-RAY EXAM CHEST 2 VIEWS: CPT | Mod: 26

## 2019-08-20 PROCEDURE — 71046 X-RAY EXAM CHEST 2 VIEWS: CPT

## 2019-08-20 PROCEDURE — 99283 EMERGENCY DEPT VISIT LOW MDM: CPT

## 2019-08-20 NOTE — ED PROVIDER NOTE - CLINICAL SUMMARY MEDICAL DECISION MAKING FREE TEXT BOX
80 yo F from NH for SOB. Patient pulse ox 98% on RA in triage. Patient refusing exam and stating no acute complaints after several attempts for examination. POA aware and will discharge back to NH.

## 2019-08-20 NOTE — ED PROVIDER NOTE - OBJECTIVE STATEMENT
80 y/o F with PMHx copd, breast cancer presents to ED for SOB. Pt  is belligerent and refuses examination stating that she had no complaints and no SOB. As per her sister Kerrie nunn [phone], pt has been having URI symptoms for the last week and is having some sob for the last 2 x days. Pt had breathing treatment this morning and still had SOB and sent in for eval. Pt is 98% on RA and in NAD. Pt lives in [facility]. 80 y/o F with PMHx COPD, breast cancer presents to ED for SOB. Pt  is belligerent and refusing examination stating that she had no complaints and no SOB. As per her sister Brissa Lozano ([phone]) and POA, pt has been having URI symptoms for the last week and is having some sob for the last 2 x days. Pt had breathing treatment this morning and was still having SOB and sent in for eval. Pt is 98% on RA and in NAD. Pt lives in [facility].

## 2019-08-20 NOTE — ED PROVIDER NOTE - CONSTITUTIONAL, MLM
normal... Well appearing, well nourished, combative awake, alert, oriented to person, place, time/situation and in no apparent distress.

## 2019-08-20 NOTE — ED ADULT NURSE NOTE - CHPI ED NUR SYMPTOMS NEG
no hemoptysis/no body aches/no chills/no fever/no shortness of breath/no headache/no chest pain/no cough/no diaphoresis

## 2019-08-20 NOTE — ED PROVIDER NOTE - NSFOLLOWUPINSTRUCTIONS_ED_ALL_ED_FT
You were seen here for your shortness of breath. You refused to be evaluated or treated by the physician. Please follow up with your primary care doctor. Please return to the Emergency Department for worsening signs or symptoms.

## 2019-08-20 NOTE — ED ADULT NURSE NOTE - OBJECTIVE STATEMENT
Pt aox1, BIB EMS from NH for SOB. Denies SOB, CP, no distress noted.  Pt is combative, refuses assessment. MD Spear aware. Pt coughing, BL hands and lower extremities edema noted.

## 2019-08-20 NOTE — ED ADULT NURSE NOTE - PMH
Bipolar 1 disorder    Breast cancer    COPD (chronic obstructive pulmonary disease)    HLD (hyperlipidemia)    Parkinson's disease

## 2019-12-11 NOTE — ED ADULT NURSE NOTE - CAS TRG GENERAL AIRWAY, MLM
If you are a smoker, it is important for your health to stop smoking. Please be aware that second hand smoke is also harmful. Patent

## 2020-09-02 NOTE — H&P ADULT - PROBLEM SELECTOR PLAN 3
Hypertension is improving with treatment.  Continue current treatment regimen.  Dietary sodium restriction.  Weight loss.  Regular aerobic exercise.  Continue current medications.  Blood pressure will be reassessed at the next regular appointment.   stable not on medication

## 2020-11-16 NOTE — PROGRESS NOTE ADULT - SUBJECTIVE AND OBJECTIVE BOX
no events ovenight    codeine (Unknown)  melatonin (Drowsiness)  Thorazine (Unknown)    Hospital Medications:   MEDICATIONS  (STANDING):  ALBUTerol/ipratropium for Nebulization 3 milliLiter(s) Nebulizer every 6 hours  anastrozole 1 milliGRAM(s) Oral daily  bethanechol 25 milliGRAM(s) Oral two times a day  cinacalcet 30 milliGRAM(s) Oral two times a day  Cyanocobalamin 100 MICROGram(s) 100 MICROGram(s) Oral daily  dextrose 5%. 1000 milliLiter(s) (75 mL/Hr) IV Continuous <Continuous>  ergocalciferol 00141 Unit(s) Oral <User Schedule>  heparin  Injectable 5000 Unit(s) SubCutaneous every 12 hours  lithium 150 milliGRAM(s) Oral two times a day  loratadine 10 milliGRAM(s) Oral daily  pantoprazole    Tablet 40 milliGRAM(s) Oral before breakfast  simvastatin 40 milliGRAM(s) Oral at bedtime  trihexyphenidyl 1 milliGRAM(s) Oral three times a day        VITALS:  T(F): 97.6 (07-16-18 @ 14:33), Max: 97.8 (07-15-18 @ 20:40)  HR: 90 (07-16-18 @ 15:15)  BP: 128/60 (07-16-18 @ 15:15)  RR: 20 (07-16-18 @ 14:33)  SpO2: 98% (07-16-18 @ 15:15)  Wt(kg): --      PHYSICAL EXAM:  Constitutional: NAD  HEENT: anicteric sclera, oropharynx clear, MMM  Neck: No JVD  Respiratory: CTAB, no wheezes, rales or rhonchi  Cardiovascular: S1, S2, RRR  Gastrointestinal: BS+, soft, NT/ND  Extremities: No cyanosis or clubbing.   Neurological: A/O x 3, no focal deficits  Psychiatric: Normal mood, normal affect  : No CVA tenderness. No crisostomo.   Skin: No rashes      LABS:  07-16    147<H>  |  111<H>  |  40<H>  ----------------------------<  104<H>  4.2   |  28  |  1.54<H>    Ca    9.6      16 Jul 2018 07:10      Creatinine Trend: 1.54 <--, 1.60 <--, 1.65 <--, 1.49 <--, 1.64 <--, 1.47 <--, 1.68 <--, 1.59 <--, 1.49 <--                        10.9   20.9  )-----------( 300      ( 16 Jul 2018 07:10 )             34.7     Urine Studies:    Calcium, Random Urine: <1 mg/dL (07-12 @ 23:30)  Creatinine, Random Urine: 35 mg/dL (07-12 @ 18:30)  Osmolality, Random Urine: 232 mos/kg (07-10 @ 11:50)  Sodium, Random Urine: 72 mmol/L (07-10 @ 11:49)  Potassium, Random Urine: 8 mmol/L (07-10 @ 11:49)  Creatinine, Random Urine: <13 mg/dL (07-10 @ 11:49)  Chloride, Random Urine: 72 mmol/L (07-10 @ 11:49)    RADIOLOGY & ADDITIONAL STUDIES: O-T Plasty Text: The defect edges were debeveled with a #15 scalpel blade.  Given the location of the defect, shape of the defect and the proximity to free margins an O-T plasty was deemed most appropriate.  Using a sterile surgical marker, an appropriate O-T plasty was drawn incorporating the defect and placing the expected incisions within the relaxed skin tension lines where possible.    The area thus outlined was incised deep to adipose tissue with a #15 scalpel blade.  The skin margins were undermined to an appropriate distance in all directions utilizing iris scissors.

## 2021-01-01 NOTE — H&P ADULT. - NEGATIVE HEMATOLOGY SYMPTOMS
"  Feed every 3 hours. Wake her to feed until seen in the clinic on Tuesday then reassess.  Give her as much as she wants unless she spits it up.  For the bilirubin issue:   keep her under phototherapy  as much as possible.  Call for what to do if she is not taking the bottle or is too yellow or too sleepy.    She needs to be seen in clinic on Tuesday.    Mother advised to continue prenatal multivit and \"top off\" the Vit D to at least 5000 IU a day    [place] Discharge Instructions  [facility] [phone]  You may not be sure when your baby is sick and needs to see a doctor, especially if this is your first baby.  DO call your clinic if you are worried about your baby s health.  Most clinics have a 24-hour nurse help line. They are able to answer your questions or reach your doctor 24 hours a day. It is best to call your doctor or clinic instead of the hospital. We are here to help you.    Call 911 if your baby:  - Is limp and floppy  - Has  stiff arms or legs or repeated jerking movements  - Arches his or her back repeatedly  - Has a high-pitched cry  - Has bluish skin  or looks very pale    Call your baby s doctor or go to the emergency room right away if your baby:  - Has a high fever: Rectal temperature of 100.4 degrees F (38 degrees C) or higher or underarm temperature of 99 degree F (37.2 C) or higher.  - Has skin that looks yellow, and the baby seems very sleepy.  - Has an infection (redness, swelling, pain) around the umbilical cord or circumcised penis OR bleeding that does not stop after a few minutes.    Call your baby s clinic if you notice:  - A low rectal temperature of (97.5 degrees F or 36.4 degree C).  - Changes in behavior.  For example, a normally quiet baby is very fussy and irritable all day, or an active baby is very sleepy and limp.  - Vomiting. This is not spitting up after feedings, which is normal, but actually throwing up the contents of the stomach.  - Diarrhea (watery stools) or " constipation (hard, dry stools that are difficult to pass).  stools are usually quite soft but should not be watery.  - Blood or mucus in the stools.  - Coughing or breathing changes (fast breathing, forceful breathing, or noisy breathing after you clear mucus from the nose).  - Feeding problems with a lot of spitting up.  - Your baby does not want to feed for more than 6 to 8 hours or has fewer diapers than expected in a 24 hour period.  Refer to the feeding log for expected number of wet diapers in the first days of life.    If you have any concerns about hurting yourself of the baby, call your doctor right away.      Baby's Birth Weight: 8 lb 5.3 oz (3780 g)  Baby's Discharge Weight: 3.49 kg (7 lb 11.1 oz)    Recent Labs   Lab Test 21  0618   DBIL 0.3   BILITOTAL 13.5*       Immunization History   Administered Date(s) Administered     Hep B, Peds or Adolescent 2021       Hearing Screen Date: 21   Hearing Screen, Left Ear: passed  Hearing Screen, Right Ear: passed     Umbilical Cord: drying, no drainage    Pulse Oximetry Screen Result: pass  (right arm): 97 %  (foot): 97 %    Car Seat Testing Results:      Date and Time of  Metabolic Screen: 21 0845     ID Band Number [id number]  I have checked to make sure that this is my baby.   no gum bleeding/no nose bleeding

## 2021-07-19 NOTE — ED ADULT TRIAGE NOTE - DIRECT TO ROOM CARE INITIATED:
[FreeTextEntry1] : Discussed with the patient health care maintenance.  \par Discussed age and condition appropriate vaccinations.  \par Discussed age appropriate cancer screening testing as indicated including colon cancer screening/colonoscopy, cervical cancer screening/PAP testing, breast cancer screening/mammogram and skin cancer screening.  \par Discussed protection from the sun.  \par Discussed healthy diet, exercise and weight control for health.\par Discussed healthy habits including abstaining from smoking and drugs and abstention/moderation with alcohol.\par Discussed routine regular ophthalmology and dental follow up.\par Routine labs discussed with the patient and sent.  04-Dec-2018 10:59

## 2021-07-26 NOTE — ED ADULT NURSE NOTE - NEURO ASSESSMENT
[facility]
                                    [address]
                                  [place], [place]  [address]
_________________________________________________________________________________________
                                                                 Signed   
 
 
 
SPECIMEN(S): A SENTINEL LYMPH NODE 1  2
SPECIMEN(S): B SENTINEL LYMPH NODE 3
SPECIMEN(S): C LEFT INFERIOR CENTRAL BREAST
SPECIMEN(S): D LEFT BREAST, ADDITIONAL DEEP MARGIN
 
SPECIMEN SOURCE:
A. SENTINEL LYMPH NODE 1  2
B. SENTINEL LYMPH NODE 3
C. LEFT INFERIOR CENTRAL BREAST
D. LEFT BREAST, ADDITIONAL DEEP MARGIN
 
CLINICAL HISTORY:
Infiltrating ductal carcinoma left breast.
Specimen Time to Fixation- 07/15/2021 1:04:00 PM
 
FROZEN SECTION DIAGNOSIS:
A. [place] lymph node #1 and 2:[place] lymph nodes: One of 2 lymph nodes 
positive for metastatic carcinoma.  Non-sentinel lymph node: No evidence of 
malignancy in representative section frozen. 
(Dr. Connors, 7/15/21, 1:20 PM)
Frozen section diagnoses called to Dr. Arnold at 1:46 PM.
B. [place] lymph node #3:Positive for metastatic carcinoma. (Dr. Connors, 
7/15/21, 1:20 PM) 
Frozen section diagnoses called to Dr. Arnold.
AI (under the direct supervision of a pathologist)
The Gross Description was prepared using a voice recognition system. The report 
was reviewed for accuracy; however, sound-alike word errors, addition and/or 
deletions may occur. If there is any 
question about this report, please contact Client Services.
 
FINAL PATHOLOGIC DIAGNOSIS:
A.  [place] lymph node,  #1  #2, lymphadenectomy:
-  Two of three lymph nodes, positive for metastatic carcinoma (2/3).
-  AE/AE3 immunohistochemical stains and multiple serial sections are positive.
B.  [place] lymph node, #3, lymphadenectomy:
-  One sentinel lymph node, positive for metastatic carcinoma (1/1).
-  AE1/AE3 immunohistochemical stain and multiple serial sections were 
examined. 
C.  Breast, inferior central, left, oriented lumpectomy:
-  Invasive ductal carcinoma with the following features:
     -  Tumor site:  6 o'clock (based on previous pathology report [id number], 
 
                                                                                    
_________________________________________________________________________________________
PATIENT NAME:     DARVIN DANIEL                 
MEDICAL RECORD #: [id number]            PATHOLOGY                     
          ACCT #: [id number]       ACCESSION #: [id number]     
DATE OF BIRTH:   [birth year]            REPORT #: [id number]       
PHYSICIAN:        JULITA GARAY              
PCP:              JERARDO GRIDER MD         
REPORT IS CONFIDENTIAL AND NOT TO BE RELEASED WITHOUT AUTHORIZATION
 
 
                                  [facility]
                                    [address], [place]  [address]
_________________________________________________________________________________________
                                                                 Signed   
 
 
June 24, 2021). 
     -  Tumor size:  28 mm.
     -  Histologic grade ([place] histologic score):
          -  Glandular/tubular differentiation score:  2/3.
          -  Nuclear pleomorphism score:  3/3.
          -  Mitotic score:  1/3.
          -  Overall grade:  II/III, total score 6/9, intermediate grade.
 
          -  Tumor focality:  Single focus of invasive carcinoma.
     -  Associated in situ component:  No DCIS is present.
     -  Tumor extension:
          -  Skin:  Skin is present and is uninvolved by carcinoma.
          -  Nipple:  No nipple identified.
          -  Skeletal muscle:  No skeletal muscle is present.
     -  Margins:
          -  Anterior margin:  Fraction of 1 mm (in vicinity of the skin).
          -  Inferior margin:  2 mm.
          -  Posterior margin:  4 mm.
          -  All other margins:  Greater than 4 mm.
     -  Summary of lymph nodes (to include specimens A and B):
          -  Total number of lymph nodes examined:  4.
          -  Total number of sentinel lymph nodes examined:  3.
          -  Total lymph nodes positive for metastatic carcinoma:  3.
          -  Total number of lymph nodes with macrometastasis:  3.
          -  Total number of lymph nodes with micrometastatsis:  0.
 
          -  Total number of lymph nodes with isolated tumor cells:  0.
          -  Size of largest metastatic deposit:  2 mm.
          -  Extra anatoliy extension:  Present.
     -  Treatment effect:  No known pre-surgical therapy.
     -  Lymphovascular invasion:  Not identified.
     -  Microcalcifications:  Present.
     -  Additional pathologic findings:  None.
     -  Estrogen receptor, progesterone receptor and HER2 previously reported 
as ER positive, IN positive, Ki-67 proliferating index of 5% and HER2 joceline 
negative. 
     -  Surgical pathology stage:  pT2, pN1a (sn).
D.  Breast, left, additional deep margin, excision:
-  Benign atrophic breast tissue and skeletal muscle bundles, negative for 
carcinoma. 
 
COMMENT:
 
                                                                                    
_________________________________________________________________________________________
PATIENT NAME:     DARVIN DANIEL                 
MEDICAL RECORD #: [id number]            PATHOLOGY                     
          ACCT #: [id number]       ACCESSION #: [id number]     
DATE OF BIRTH:   [birth year]            REPORT #: [id number]       
PHYSICIAN:        [facility]              
PCP:              JERARDO GRIDER MD         
REPORT IS CONFIDENTIAL AND NOT TO BE RELEASED WITHOUT AUTHORIZATION
 
 
                                  [address]  [address]
_________________________________________________________________________________________
                                                                 Signed   
 
 
Control slides stain appropriately positive.
As part of [facility]' Quality Improvement Program, this case was 
reviewed by another member of our pathology staff. 
SANDIK:NAL:cml:C1NR
 
MICROSCOPIC EXAMINATION:
Histologic sections of all submitted blocks are examined by light microscopy.  
These findings, together with the gross examination, support the pathologic 
diagnosis. 
 
GROSS DESCRIPTION:
Four specimens are received in four containers, labeled "BA."
A. The specimen, labeled "BA, A," and designated on the requisition "sentinel 
lymph node 1+ 2," is received fresh for frozen section diagnosis and consists 
of a 4.0 x 2.5 x 2.5 cm piece of adipose 
tissue with 3 pink-tan lymph node candidates from 0.6 up to 1.2 cm in greatest 
dimension.  2 of the lymph node candidates are stitched and one of the 2 
stitched nodes is inked blue.  Per the surgeon, 
the third, unstitched lymph node candidate is a non-sentinel lymph node.  The 
sentinel nodes are bisected and one half of each is submitted for frozen in 
AFR1.  The third, non-sentinel lymph node is 
bisected and a portion is submitted for frozen section diagnosis in AFR2.
The remainder of the lymph node candidates are submitted entirely as follows:
A1   remainder of AFS1
A2   remainder of 2 sentinel nodes (contents of cassette "SLN NOT")
A3   remainder of AFS2
 
A4   remainder of third non-sentinel node (contents of cassette "NON SLN NOT")
B. The specimen, labeled "BA, B," and designated on the requisition "sentinel 
lymph node #3," is received fresh for frozen section diagnosis and consists of 
a 2.0 x 1.5 x 1.5 cm segment of adipose 
tissue containing the one, 0.5 x 0.5 x 0.4 cm lymph node candidate.  The lymph 
node candidate is bisected and one half of the lymph node candidate is frozen 
in BFR1. 
The remainder of the lymph node candidate is submitted entirely as follows:
B1 remainder of BFS1
B2 remainder of lymph node candidate
C.  The specimen, labeled "BA, C"  and designated on the requisition "inferior 
central breast lumpectomy, L, long stitch lateral, short stitch superior," is 
received in formalin and consists of a 121 
g, yellow, lobulated, irregularly-shaped, 10.6 x 7.8 x 3.6 cm lumpectomy 
specimen that is one 2 with a long suture indicating lateral and a short suture 
 
                                                                                    
_________________________________________________________________________________________
PATIENT NAME:     DARVIN DANIEL                 
MEDICAL RECORD #: [id number]            PATHOLOGY                     
          ACCT #: [id number]       ACCESSION #: [id number]     
DATE OF BIRTH:   [birth year]            REPORT #: [id number]       
PHYSICIAN:        JULITA PATHOLOGY              
PCP:              JERARDO GRIDER MD         
REPORT IS CONFIDENTIAL AND NOT TO BE RELEASED WITHOUT AUTHORIZATION
 
 
                                  [facility]
                                    [address], [place]  [address]
_________________________________________________________________________________________
                                                                 Signed   
 
 
indicating superior.  On the anterior surfaces 
 
specimen is a tan, grossly unremarkable, somewhat oval, 6.5 x 3.7 centimeters 
skin segment.  The specimen is inked as follows: Superior = blue; inferior = 
green; anterior = yellow; posterior = black; 
medial = red; lateral = orange.  The specimen is sectioned from lateral to 
medial into 15 slices to reveal an ill-defined, tan-white, indurated, 2.8 x 2.7 
x 2.2 cm lesion (slice 6-13) without a 
grossly identifiable biopsy marker.  The lesion grossly appears to involve the 
anterior margin, and abuts the skin.  The lesion is 0.2 cm from the inferior 
margin, 0.4 cm from the deep margin, 1.5 cm 
from the medial margin, 1.8 cm from the lateral margin, and 3.3 cm from the 
superior margin.  The remaining parenchyma is comprised of yellow fibrofatty 
and tan-white fibroglandular tissue without an 
additional discrete mass/lesion.  Fibroglandular tissue comprises approximately 
less than 5% of the rete parenchyma. 
Representative sections are submitted as follows:
 
C1-C2     lesion to inferior margin (slice 7)
C3-C5     lesion to skin and anterior margin (slice 7, 6, and 11 respectively)
C6-C7     lesion to deep margin (slice 8 and 10 respectively)
C8   nearest lateral margin to lesion (slice 2)
C9   nearest medial margin to lesion (slice 14)
C10  nearest superior margin to lesion (slice 7)
C11  representative of remaining parenchyma (slice 4)
Cold ischemic time: One minute per requisition form
Formalin fixation time: Approximately 23 hours
D.  The specimen, labeled "BA, D," and designated on the requisition 
"additional deep margin, L breast biopsy with a portion of pectoralis muscle, 
short stitch superior, long lateral," is received in 
formalin and consists of a 23 g, irregularly-shaped, 6.7 x 5.6 x 1.7 cm 
lumpectomy specimen that is oriented with a long suture indicating lateral and 
a short suture indicating superior.  On the 
 
posterior aspect of the specimen is a 2.4 by 2.3 cm area of gray-brown, 
somewhat fibrous tissue consistent with possible muscle.  The specimen is inked 
as follows: Superior = blue; inferior = green; 
posterior = black; anterior = yellow; medial = red; lateral = orange.  The 
specimen is sectioned from superior to inferior into 14 slices to reveal yellow 
fibrofatty and tan-white fibroglandular 
tissue without a discrete mass/lesion.  Fibroglandular tissue comprises 
approximately less than 5% of the parenchyma.  Additionally the possible muscle 
 
                                                                                    
_________________________________________________________________________________________
PATIENT NAME:     DARVIN DANIEL                 
MEDICAL RECORD #: [id number]            PATHOLOGY                     
          ACCT #: [id number]       ACCESSION #: [id number]     
DATE OF BIRTH:   [birth year]            REPORT #: [id number]       
PHYSICIAN:        JULITA GARAY              
PCP:              JERARDO GRIDER MD         
REPORT IS CONFIDENTIAL AND NOT TO BE RELEASED WITHOUT AUTHORIZATION
 
 
                                  [address]  [address]
_________________________________________________________________________________________
                                                                 Signed   
 
 
is up to 0.6 cm deep and without a discrete 
mass/lesion.
The specimen is submitted entirely as follows:
D1-D2     perpendicularly sectioned superior end (slice one)
D3   slice 3
D4   slice 4
D5-D6     slice 5 bisected
D7-D12    slice 6 trisected (D7-D8,D9-D10,D11-D12 are bivalved sections)
D13-D15   trisected slice 7
D16-D18   trisected slice 8
D19-D21   trisected slice 9
D22-D25   bisected slice 10 (D22-D23 and D24-D25 are bivalved sections)
 
D26-D27   bisected slice 11
D28-D29   bisected slice 12
D30  bisected slice 13
D31  slice 14
D32  perpendicularly sectioned inferior end (slice 15)
Cold ischemic time: One minute per requisition forms
Formalin fixation time: Approximately 20 hours
 
ADDITIONAL NOTES:
Immunohistochemical and/or in situ hybridization studies were performed on this 
case with the appropriate positive controls that react as expected.  This test 
was developed and its performance 
characteristics determined by [facility].  It has not been cleared or 
approved by the U.S. Food and Drug Administration.  The FDA has determined that 
such clearance or approval is not 
necessary.  This test is used for clinical purposes.  It should not be regarded 
as investigational or for research.  [facility] is certified under the 
Clinical Laboratory Improvement 
Amendments of 1988 (CLIA) as qualified to perform high complexity clinical 
laboratory testing.  This assay has not been validated for specimens that have 
been decalcified. 
 
PERFORMING LABORATORY:
[facility]rozen [facility] was performed by [facility], [address] [address] (CLIA# [id number]). The 
technical component was performed by [facility], [address] [place] [address] (Medical Director: Rachel Varghese MD; CLIA# [id number]).Professional interpretation was performed by [facility], [facility], [address] 
 
                                                                                    
_________________________________________________________________________________________
PATIENT NAME:     DARVIN DANIEL                 
MEDICAL RECORD #: [id number]            PATHOLOGY                     
          ACCT #: [id number]       ACCESSION #: [id number]     
DATE OF BIRTH:   [birth year]            REPORT #: [id number]       
PHYSICIAN:        INCYTE PATHOLOGY              
PCP:              JERARDO GRIDER MD         
REPORT IS CONFIDENTIAL AND NOT TO BE RELEASED WITHOUT AUTHORIZATION
 
 
                                  [address]
                                  [place]  [address]
_________________________________________________________________________________________
                                                                 Signed   
 
 
[place], [place]  [address] (CLIA# [id number]).
 
Diagnostician:  Js Pace MD
Pathologist
Electronically Signed 07/26/2021
 
 
Copies:                                
~
 
 
 
 
 
 
 
 
 
 
 
 
 
 
 
 
 
 
 
 
 
 
 
 
 
 
 
 
 
 
 
 
 
 
                                                                                    
_________________________________________________________________________________________
PATIENT NAME:     DARVIN DANIEL                 
MEDICAL RECORD #: [id number]            PATHOLOGY                     
          ACCT #: [id number]       ACCESSION #: [id number]     
DATE OF BIRTH:   [birth year]            REPORT #: [id number]       
PHYSICIAN:        JULITA PATHOLOGY              
PCP:              JERARDO GRIDER MD         
REPORT IS CONFIDENTIAL AND NOT TO BE RELEASED WITHOUT AUTHORIZATION - - -

## 2021-10-26 NOTE — PATIENT PROFILE ADULT. - NS PRO OT REFERRAL QUES 2 YN
Patient's daughter Lucille Gilbert called to let [facility] know that cardiologist talked to sister, will have another cardiologist see patient .  Will keep for observation tonight    Patient is waiting for a bed, still in the ER now, daughter wanted to move her to [place] yes

## 2022-11-10 NOTE — PATIENT PROFILE ADULT. - NUTRITION PROFILE
Detail Level: Generalized Quality 130: Documentation Of Current Medications In The Medical Record: Current Medications Documented no indicators present

## 2023-04-05 NOTE — ED ADULT NURSE NOTE - NS ED NURSE LEVEL OF CONSCIOUSNESS MENTAL STATUS
Notified pt
Patient calling to ask for a diflucan   From recently being on an antibiotic
Alert/Awake

## 2023-05-16 NOTE — PROGRESS NOTE ADULT - SUBJECTIVE AND OBJECTIVE BOX
Patient is a 78y Female   BEING  FOLL  FOR    HIGH  CA AND  ELEVATED  CR   IS OOB  TO  CHAIR  AND  OFFERS  NO  COMPLAINTS  NO  ACUTE  EVENTS  OVERNIGHT      codeine (Unknown)  melatonin (Drowsiness)  Thorazine (Unknown)    Hospital Medications:   MEDICATIONS  (STANDING):  ALBUTerol/ipratropium for Nebulization 3 milliLiter(s) Nebulizer every 6 hours  anastrozole 1 milliGRAM(s) Oral daily  bethanechol 25 milliGRAM(s) Oral two times a day  cinacalcet 30 milliGRAM(s) Oral two times a day  Cyanocobalamin 100 MICROGram(s) 100 MICROGram(s) Oral daily  dextrose 5%. 1000 milliLiter(s) (75 mL/Hr) IV Continuous <Continuous>  ergocalciferol 05420 Unit(s) Oral <User Schedule>  heparin  Injectable 5000 Unit(s) SubCutaneous every 12 hours  loratadine 10 milliGRAM(s) Oral daily  pantoprazole    Tablet 40 milliGRAM(s) Oral before breakfast  predniSONE   Tablet 20 milliGRAM(s) Oral daily  simvastatin 40 milliGRAM(s) Oral at bedtime  trihexyphenidyl 1 milliGRAM(s) Oral three times a day    REVIEW OF SYSTEMS:  IS  OOB  TO  CHAIR    ANSWERING  SIMPLE  QUESTIONS  DOESNT APPEAR  SOB     NO  FEVER/CHILLS   APPETITE IS OK  VITALS:  T(F): 97.7 (18 @ 05:44), Max: 99.3 (18 @ 20:21)  HR: 66 (18 @ 05:44)  BP: 127/64 (18 @ 05:44)  RR: 18 (18 @ 05:44)  SpO2: 99% (18 @ 05:44)  Wt(kg): --      PHYSICAL EXAM:  Constitutional: NAD  HEENT:CONJ  PINK  Neck: No JVD  Respiratory: CTAB, no wheezes, rales or rhonchi  Cardiovascular: S1, S2, RRR  Gastrointestinal: BS+, soft, NT/ND  Extremities:  No peripheral edema  Neurological: A/O x 1  : . No crisostomo.       LABS:      143  |  108  |  33<H>  ----------------------------<  137<H>  4.3   |  26  |  1.49<H>    Ca    10.1      2018 06:43  Phos  3.6     07-13      Creatinine Trend: 1.49 <--, 1.64 <--, 1.47 <--, 1.68 <--, 1.59 <--, 1.49 <--, 1.46 <--                        11.1   19.1  )-----------( 320      ( 2018 06:43 )             36.0     Urine Studies:  Urinalysis Basic - ( 2018 12:19 )    Color: Yellow / Appearance: Hazy / S.005 / pH:   Gluc:  / Ketone: Negative  / Bili: Negative / Urobili: Negative   Blood:  / Protein: 15 / Nitrite: Negative   Leuk Esterase: Moderate / RBC: 2-5 /HPF / WBC 26-50 /HPF   Sq Epi:  / Non Sq Epi: Occasional /HPF / Bacteria: Few /HPF      Creatinine, Random Urine: 35 mg/dL ( @ 18:30)  Osmolality, Random Urine: 232 mos/kg (07-10 @ 11:50)  Sodium, Random Urine: 72 mmol/L (07-10 @ 11:49)  Potassium, Random Urine: 8 mmol/L (07-10 @ 11:49)  Creatinine, Random Urine: <13 mg/dL (07-10 @ 11:49)  Chloride, Random Urine: 72 mmol/L (07-10 @ 11:49)    RADIOLOGY & ADDITIONAL STUDIES: Complex Repair And V-Y Plasty Text: The defect edges were debeveled with a #15 scalpel blade.  The primary defect was closed partially with a complex linear closure.  Given the location of the remaining defect, shape of the defect and the proximity to free margins a V-Y plasty was deemed most appropriate for complete closure of the defect.  Using a sterile surgical marker, an appropriate advancement flap was drawn incorporating the defect and placing the expected incisions within the relaxed skin tension lines where possible.    The area thus outlined was incised deep to adipose tissue with a #15 scalpel blade.  The skin margins were undermined to an appropriate distance in all directions utilizing iris scissors.

## 2023-05-22 NOTE — H&P ADULT. - PSY GEN HX ROS MEA POS PC
Detail Level: Detailed Lesion Type: Abscess Method: 15 blade Curette: No Anesthesia Type: 1% lidocaine with epinephrine Size Of Lesion In Cm (Optional But May Be Required For Some Insurances): 0 Wound Care: Petrolatum Dressing: dry sterile dressing Epidermal Sutures: 4-0 Ethilon Epidermal Closure: simple interrupted Suture Text: The incision was partially closed with Preparation Text: The area was prepped in the usual clean fashion. Curette Text (Optional): After the contents were expressed a curette was used to partially remove the cyst wall. Consent was obtained and risks were reviewed including but not limited to delayed wound healing, infection, need for multiple I and D's, and pain. Post-Care Instructions: I reviewed with the patient in detail post-care instructions. Patient should keep wound covered and call the office should any redness, pain, swelling or worsening occur. tramaine/anxiety

## 2023-06-03 NOTE — PATIENT PROFILE ADULT. - NS PRO PT REFERRAL QUES 2 YN
Alert and oriented, no focal deficits, no motor or sensory deficits. 5/5 motor strength x 4E. unable to assess PT confused

## 2023-12-04 NOTE — PATIENT PROFILE ADULT - NSPROEXTENSIONSOFSELF_GEN_A_NUR
Render Risk Assessment In Note?: yes Comment: Stable on medications\\nDiscussed hair transplant procedure vs excision to reduce size of alopecia.  Referred Dr. Annie Burks. Detail Level: Zone none

## 2024-03-13 NOTE — DISCHARGE NOTE ADULT - MEDICATION SUMMARY - MEDICATIONS TO CHANGE
Suture removal in 5 days    Keep wound clean    CT scan of the brain is without fracture or bleeding     Have a better day  
I will SWITCH the dose or number of times a day I take the medications listed below when I get home from the hospital:  None

## 2024-04-01 NOTE — H&P ADULT - PSH
From: Dorothy Lantigua  To: Kristina Hogue  Sent: 3/31/2024 9:02 PM CDT  Subject: Advanced Care directive    Hi I'm wondering if someone can get rosalinda to give me a call I forgot her last name she helps with the advanced Care directives I need to get in with her sooner than later if she can call me at [phone] that would be great and again it's about the advance care directive I had one here filled out and I dropped it off at the  I can't tell you how long it was to go and they said they don't have one scanned in so I tried to look for my copy and I haven't been able to find it so I'm asking if Rosalinda can make time for me tomorrow I have a 10:00 appointment with physical therapy but if I can see her first thing in the morning that would be awesome please have her give me a call at [phone] and this is Dorothy Lantigua date of birth August 15th 1955  
No significant past surgical history

## 2024-05-17 NOTE — H&P ADULT - RS GEN PE MLT RESP DETAILS PC
Replied to the patient through the patient schedule. Informed them that I got them reschedule for 05/21/24 at 2:15 with a 1:45 x-ray.   normal/no chest wall tenderness/breath sounds equal/good air movement

## 2024-06-21 NOTE — PHYSICAL THERAPY INITIAL EVALUATION ADULT - IMPAIRMENTS FOUND, PT EVAL
Problem: Discharge Planning  Goal: Discharge to home or other facility with appropriate resources  Outcome: Progressing     Problem: Pain  Goal: Verbalizes/displays adequate comfort level or baseline comfort level  Outcome: Progressing     Problem: Skin/Tissue Integrity  Goal: Absence of new skin breakdown  Description: 1.  Monitor for areas of redness and/or skin breakdown  2.  Assess vascular access sites hourly  3.  Every 4-6 hours minimum:  Change oxygen saturation probe site  4.  Every 4-6 hours:  If on nasal continuous positive airway pressure, respiratory therapy assess nares and determine need for appliance change or resting period.  Outcome: Progressing     Problem: Safety - Adult  Goal: Free from fall injury  Outcome: Progressing     Problem: ABCDS Injury Assessment  Goal: Absence of physical injury  Outcome: Progressing     Problem: Chronic Conditions and Co-morbidities  Goal: Patient's chronic conditions and co-morbidity symptoms are monitored and maintained or improved  Outcome: Progressing      cognitive impairment/aerobic capacity/endurance/gross motor/arousal, attention, and cognition/gait, locomotion, and balance/poor safety awareness/muscle strength

## 2024-07-10 NOTE — ED PROVIDER NOTE - NS ED ATTENDING STATEMENT MOD
Call placed to patient and message left to let her know that her UDS and cystoscopy are both scheduled on 10/28/24 already. Instructed her to call back with any questions.    Thank you,  Dior Dodge RN, BSN Urology Triage     I personally performed the services described in the documentation, reviewed the documentation recorded by the scribe in my presence and it accurately and completely records my words and action.
